# Patient Record
Sex: FEMALE | Race: WHITE | NOT HISPANIC OR LATINO | URBAN - METROPOLITAN AREA
[De-identification: names, ages, dates, MRNs, and addresses within clinical notes are randomized per-mention and may not be internally consistent; named-entity substitution may affect disease eponyms.]

---

## 2023-05-03 ENCOUNTER — OFFICE VISIT (OUTPATIENT)
Dept: FAMILY MEDICINE CLINIC | Facility: CLINIC | Age: 68
End: 2023-05-03

## 2023-05-03 VITALS
RESPIRATION RATE: 16 BRPM | TEMPERATURE: 98.8 F | HEART RATE: 74 BPM | HEIGHT: 62 IN | OXYGEN SATURATION: 97 % | DIASTOLIC BLOOD PRESSURE: 80 MMHG | BODY MASS INDEX: 41.41 KG/M2 | WEIGHT: 225 LBS | SYSTOLIC BLOOD PRESSURE: 110 MMHG

## 2023-05-03 DIAGNOSIS — I10 PRIMARY HYPERTENSION: ICD-10-CM

## 2023-05-03 DIAGNOSIS — Z00.00 ANNUAL PHYSICAL EXAM: Primary | ICD-10-CM

## 2023-05-03 DIAGNOSIS — E66.01 CLASS 3 SEVERE OBESITY DUE TO EXCESS CALORIES WITH SERIOUS COMORBIDITY AND BODY MASS INDEX (BMI) OF 40.0 TO 44.9 IN ADULT (HCC): ICD-10-CM

## 2023-05-03 DIAGNOSIS — Z23 IMMUNIZATION DUE: ICD-10-CM

## 2023-05-03 DIAGNOSIS — M19.90 GENERALIZED ARTHRITIS: ICD-10-CM

## 2023-05-03 DIAGNOSIS — F32.A ANXIETY AND DEPRESSION: ICD-10-CM

## 2023-05-03 DIAGNOSIS — F41.9 ANXIETY AND DEPRESSION: ICD-10-CM

## 2023-05-03 DIAGNOSIS — Z12.31 BREAST CANCER SCREENING BY MAMMOGRAM: ICD-10-CM

## 2023-05-03 DIAGNOSIS — R92.8 ABNORMAL MAMMOGRAM OF RIGHT BREAST: ICD-10-CM

## 2023-05-03 DIAGNOSIS — J45.20 MILD INTERMITTENT ASTHMA WITHOUT COMPLICATION: ICD-10-CM

## 2023-05-03 DIAGNOSIS — E55.9 VITAMIN D DEFICIENCY: ICD-10-CM

## 2023-05-03 DIAGNOSIS — Z96.653 HISTORY OF BILATERAL KNEE ARTHROPLASTY: ICD-10-CM

## 2023-05-03 DIAGNOSIS — Z11.59 NEED FOR HEPATITIS C SCREENING TEST: ICD-10-CM

## 2023-05-03 DIAGNOSIS — T78.40XD ALLERGY, SUBSEQUENT ENCOUNTER: ICD-10-CM

## 2023-05-03 DIAGNOSIS — N63.0 BREAST NODULE: ICD-10-CM

## 2023-05-03 DIAGNOSIS — K21.9 GASTROESOPHAGEAL REFLUX DISEASE WITHOUT ESOPHAGITIS: ICD-10-CM

## 2023-05-03 PROBLEM — T78.40XA ALLERGIES: Status: ACTIVE | Noted: 2023-05-03

## 2023-05-03 PROBLEM — E66.813 CLASS 3 SEVERE OBESITY DUE TO EXCESS CALORIES WITH SERIOUS COMORBIDITY AND BODY MASS INDEX (BMI) OF 40.0 TO 44.9 IN ADULT (HCC): Status: ACTIVE | Noted: 2023-05-03

## 2023-05-03 RX ORDER — MELATONIN
1000 DAILY
COMMUNITY

## 2023-05-03 RX ORDER — VALSARTAN AND HYDROCHLOROTHIAZIDE 160; 12.5 MG/1; MG/1
1 TABLET, FILM COATED ORAL DAILY
Qty: 90 TABLET | Refills: 3 | Status: SHIPPED | OUTPATIENT
Start: 2023-05-03

## 2023-05-03 RX ORDER — PAROXETINE HYDROCHLORIDE 20 MG/1
TABLET, FILM COATED ORAL
COMMUNITY
Start: 2023-02-23 | End: 2023-05-03 | Stop reason: SDUPTHER

## 2023-05-03 RX ORDER — MELOXICAM 15 MG/1
TABLET ORAL
COMMUNITY
Start: 2023-03-13

## 2023-05-03 RX ORDER — PAROXETINE HYDROCHLORIDE 20 MG/1
20 TABLET, FILM COATED ORAL DAILY
Qty: 90 TABLET | Refills: 3 | Status: SHIPPED | OUTPATIENT
Start: 2023-05-03

## 2023-05-03 RX ORDER — FAMOTIDINE 20 MG/1
20 TABLET, FILM COATED ORAL 2 TIMES DAILY
Qty: 180 TABLET | Refills: 3 | Status: SHIPPED | OUTPATIENT
Start: 2023-05-03

## 2023-05-03 RX ORDER — VALSARTAN AND HYDROCHLOROTHIAZIDE 160; 12.5 MG/1; MG/1
1 TABLET, FILM COATED ORAL DAILY
COMMUNITY
End: 2023-05-03 | Stop reason: SDUPTHER

## 2023-05-03 RX ORDER — ALBUTEROL SULFATE 90 UG/1
2 AEROSOL, METERED RESPIRATORY (INHALATION) EVERY 6 HOURS PRN
COMMUNITY

## 2023-05-03 RX ORDER — CETIRIZINE HYDROCHLORIDE 10 MG/1
10 TABLET ORAL DAILY
COMMUNITY

## 2023-05-03 RX ORDER — DIPHENOXYLATE HYDROCHLORIDE AND ATROPINE SULFATE 2.5; .025 MG/1; MG/1
1 TABLET ORAL DAILY
COMMUNITY

## 2023-05-03 NOTE — PROGRESS NOTES
Name: Keith Auguste      : 1955      MRN: [id number]  Encounter Provider: Josee Bhat MD  Encounter Date: 5/3/2023   Encounter department: [address] [place] [address] MEDICINE    Assessment & Plan     1  Annual physical exam  Assessment & Plan:  Check routine labs      Orders:  -     CBC and differential; Future  -     Comprehensive metabolic panel; Future; Expected date: 2023  -     Lipid panel; Future; Expected date: 2023  -     Urinalysis with microscopic    2  Primary hypertension  Assessment & Plan:  Well controlled on current therapy continue with current medications and will reassess next visit      Orders:  -     valsartan-hydrochlorothiazide (DIOVAN-HCT) 160-12 5 MG per tablet; Take 1 tablet by mouth daily    3  Need for hepatitis C screening test  -     Hepatitis C Antibody; Future    4  Abnormal mammogram of right breast  Assessment & Plan:  Benign lobule due for bilateral mammo and right breast US      5  Breast cancer screening by mammogram  -     Mammo diagnostic bilateral w cad; Future; Expected date: 2023  -     US breast right limited (diagnostic); Future; Expected date: 2023    6  Breast nodule  -     US breast right limited (diagnostic); Future; Expected date: 2023    7  Vitamin D deficiency  Assessment & Plan:  Check level    Orders:  -     Vitamin D 25 hydroxy; Future    8  Anxiety and depression  Assessment & Plan:  Doing well on paxil needs refill    Orders:  -     PARoxetine (PAXIL) 20 mg tablet; Take 1 tablet (20 mg total) by mouth daily    9  Generalized arthritis  Assessment & Plan:  Helped with meloxicam      10  Mild intermittent asthma without complication  Assessment & Plan:  Uses inhaler with URI or grass exposure       11  Allergy, subsequent encounter  Assessment & Plan: On meds       12   Gastroesophageal reflux disease without esophagitis  Assessment & Plan:  Pt on pepcid 20mg po bid had endoscopy 10/22 ok     Orders:  - famotidine (PEPCID) 20 mg tablet; Take 1 tablet (20 mg total) by mouth 2 (two) times a day    13  History of bilateral knee arthroplasty  Assessment & Plan:  Needs prophylaxis for dental work can take amoxicillin gets diarrhea with augmentin      14  Immunization due  -     TDAP VACCINE GREATER THAN OR EQUAL TO 6YO IM    15  Class 3 severe obesity due to excess calories with serious comorbidity and body mass index (BMI) of 40 0 to 44 9 in adult [facility])  Assessment & Plan:  Discussion on diet and exercise guidelines for weight loss and  health reviewed with pt              Subjective      HPI new pt  Here for physical interval vsiit and to establish care multiple problems and meds  Reviewed chart and records   Review of Systems   Constitutional: Negative for appetite change, chills, fatigue and fever  Respiratory: Negative for cough, chest tightness and shortness of breath  Cardiovascular: Negative for chest pain, palpitations and leg swelling  Gastrointestinal: Negative for abdominal pain, constipation, diarrhea, nausea and vomiting  Genitourinary: Negative for difficulty urinating and frequency  Musculoskeletal: Negative for arthralgias (generalized arthritis), back pain, gait problem and neck pain  Skin: Negative for rash  Neurological: Negative for dizziness, weakness, light-headedness, numbness and headaches  Hematological: Does not bruise/bleed easily  Psychiatric/Behavioral: Negative for dysphoric mood and sleep disturbance  The patient is not nervous/anxious          Current Outpatient Medications on File Prior to Visit   Medication Sig    albuterol (PROVENTIL HFA,VENTOLIN HFA) 90 mcg/act inhaler Inhale 2 puffs every 6 (six) hours as needed for wheezing    cetirizine (ZyrTEC) 10 mg tablet Take 10 mg by mouth daily    cholecalciferol (VITAMIN D3) 1,000 units tablet Take 1,000 Units by mouth daily    meloxicam (MOBIC) 15 mg tablet     multivitamin (THERAGRAN) TABS Take 1 tablet by mouth "daily    [DISCONTINUED] PARoxetine (PAXIL) 20 mg tablet     [DISCONTINUED] valsartan-hydrochlorothiazide (DIOVAN-HCT) 160-12 5 MG per tablet Take 1 tablet by mouth daily       Objective     /80 (BP Location: Left arm, Patient Position: Sitting, Cuff Size: Large)   Pulse 74   Temp 98 8 °F (37 1 °C) (Tympanic)   Resp 16   Ht 5' 2\" (1 575 m)   Wt 102 kg (225 lb)   SpO2 97%   BMI 41 15 kg/m²     Physical Exam  Vitals reviewed  Constitutional:       General: She is not in acute distress  Appearance: Normal appearance  She is well-developed  She is obese  HENT:      Head: Normocephalic  Right Ear: Tympanic membrane, ear canal and external ear normal       Left Ear: Tympanic membrane, ear canal and external ear normal       Nose: Nose normal       Mouth/Throat:      Pharynx: No oropharyngeal exudate  Eyes:      General: Lids are normal       Extraocular Movements: Extraocular movements intact  Conjunctiva/sclera: Conjunctivae normal       Pupils: Pupils are equal, round, and reactive to light  Neck:      Thyroid: No thyromegaly  Vascular: No carotid bruit  Cardiovascular:      Rate and Rhythm: Normal rate and regular rhythm  Pulses: Normal pulses  Heart sounds: Normal heart sounds  No murmur heard  No friction rub  Pulmonary:      Effort: Pulmonary effort is normal  No respiratory distress  Breath sounds: Normal breath sounds  No stridor  No wheezing or rales  Chest:   Breasts:     Breasts are symmetrical       Right: Normal  No swelling, bleeding, inverted nipple, mass, nipple discharge, skin change or tenderness  Left: Normal  No swelling, bleeding, inverted nipple, mass, nipple discharge, skin change or tenderness  Abdominal:      General: Bowel sounds are normal  There is no distension  Palpations: Abdomen is soft  There is no mass  Tenderness: There is no abdominal tenderness  There is no guarding  Hernia: No hernia is present   " Musculoskeletal:         General: Normal range of motion  Cervical back: Full passive range of motion without pain, normal range of motion and neck supple  Lymphadenopathy:      Cervical: No cervical adenopathy  Skin:     General: Skin is warm and dry  Findings: No rash  Comments: Nl appearing moles   Neurological:      General: No focal deficit present  Mental Status: She is alert and oriented to person, place, and time  Mental status is at baseline  Cranial Nerves: No cranial nerve deficit  Sensory: No sensory deficit  Motor: No abnormal muscle tone  Coordination: Coordination normal       Gait: Gait normal       Deep Tendon Reflexes: Reflexes normal  Babinski sign absent on the right side  Psychiatric:         Mood and Affect: Mood normal          Speech: Speech normal          Behavior: Behavior normal          Thought Content: Thought content normal          Judgment: Judgment normal      BMI Counseling: Body mass index is 41 15 kg/m²  The BMI is above normal  Nutrition recommendations include 3-5 servings of fruits/vegetables daily, reducing fast food intake, consuming healthier snacks, decreasing soda and/or juice intake, moderation in carbohydrate intake and increasing intake of lean protein  Exercise recommendations include exercising 3-5 times per week and strength training exercises    Josee Bhat MD

## 2023-05-04 ENCOUNTER — TELEPHONE (OUTPATIENT)
Dept: ADMINISTRATIVE | Facility: OTHER | Age: 68
End: 2023-05-04

## 2023-05-04 NOTE — TELEPHONE ENCOUNTER
----- Message from Thong Rich MD sent at 5/3/2023  2:46 PM EDT -----  Colonoscopy 10/22 [facility] Concentration Of Solution Injected (Mg/Ml): 10.0

## 2023-05-04 NOTE — TELEPHONE ENCOUNTER
----- Message from Alejandra Zuniga MD sent at 5/3/2023  2:45 PM EDT -----  Mammo and dexa in [facility] chart care everywhere

## 2023-05-04 NOTE — TELEPHONE ENCOUNTER
Upon review of the In Basket request we were able to locate, review, and update the patient chart as requested for CRC: Colonoscopy  Any additional questions or concerns should be emailed to the Practice Liaisons via the appropriate education email address, please do not reply via In Basket      Thank you  Mitchell Biswas

## 2023-05-04 NOTE — TELEPHONE ENCOUNTER
Upon review of the In Basket request we were able to locate, review, and update the patient chart as requested for DEXA Scan  Any additional questions or concerns should be emailed to the Practice Liaisons via the appropriate education email address, please do not reply via In Basket      Thank you  Gil Silva

## 2023-05-04 NOTE — TELEPHONE ENCOUNTER
----- Message from Demetrius Cowden, MD sent at 5/3/2023  2:50 PM EDT -----  Colonoscopy 10/22 in Care everywhere must get result from Op note

## 2023-05-04 NOTE — TELEPHONE ENCOUNTER
----- Message from Damián Ivory sent at 5/3/2023  2:45 PM EDT -----  Regarding: care gap request  05/03/23 2:45 PM    Hello, our patient attached above has had CRC: Colonoscopy completed/performed  Please assist in updating the patient chart by pulling the Care Everywhere (CE) document  The date of service is October 2022       Thank you,  Damián Ivory  [facility]

## 2023-05-04 NOTE — TELEPHONE ENCOUNTER
Upon review of the In Basket request we were able to locate, review, and update the patient chart as requested for Mammogram     Any additional questions or concerns should be emailed to the Practice Liaisons via the appropriate education email address, please do not reply via In Basket      Thank you  Yang Najera

## 2023-05-11 ENCOUNTER — OFFICE VISIT (OUTPATIENT)
Dept: DERMATOLOGY | Facility: CLINIC | Age: 68
End: 2023-05-11

## 2023-05-11 VITALS — TEMPERATURE: 97.9 F | HEIGHT: 62 IN | BODY MASS INDEX: 41.51 KG/M2 | WEIGHT: 225.6 LBS

## 2023-05-11 DIAGNOSIS — D48.5 NEOPLASM OF UNCERTAIN BEHAVIOR OF SKIN: ICD-10-CM

## 2023-05-11 DIAGNOSIS — L82.1 SEBORRHEIC KERATOSIS: ICD-10-CM

## 2023-05-11 DIAGNOSIS — R21 RASH AND NONSPECIFIC SKIN ERUPTION: ICD-10-CM

## 2023-05-11 DIAGNOSIS — D22.9 MULTIPLE NEVI: ICD-10-CM

## 2023-05-11 DIAGNOSIS — L81.4 LENTIGINES: Primary | ICD-10-CM

## 2023-05-11 NOTE — PROGRESS NOTES
"[facility] Note     Patient Name: Anjel Parr  Encounter Date: 05/11/23     Have you been cared for by a [place] [address] Dermatologist in the last 3 years and, if so, which description applies to you? NO  I am considered a \"new\" patient and must complete all patient intake questions  I am FEMALE/of child-bearing potential     REVIEW OF SYSTEMS:  Have you recently had or currently have any of the following? · Recent fever or chills? No  · Any non-healing wound? No  · Are you pregnant or planning to become pregnant? No  · Are you currently or planning to be nursing or breast feeding? No   PAST MEDICAL HISTORY:  Have you personally ever had or currently have any of the following? If \"YES,\" then please provide more detail  · Skin cancer (such as Melanoma, Basal Cell Carcinoma, Squamous Cell Carcinoma? No  · Tuberculosis, HIV/AIDS, Hepatitis B or C: No  · Systemic Immunosuppression such as Diabetes, Biologic or Immunotherapy, Chemotherapy, Organ Transplantation, Bone Marrow Transplantation No  · Radiation Treatment No   FAMILY HISTORY:  Any \"first degree relatives\" (parent, brother, sister, or child) with the following? • Skin Cancer, Pancreatic or Other Cancer? YES, 2 sisters   PATIENT EXPERIENCE:    • Do you want the Dermatologist to perform a COMPLETE skin exam today including a clinical examination under the \"bra and underwear\" areas? Yes  • If necessary, do we have your permission to call and leave a detailed message on your Preferred Phone number that includes your specific medical information?   Yes      Allergies   Allergen Reactions   • Amoxicillin-Pot Clavulanate Diarrhea      Current Outpatient Medications:   •  albuterol (PROVENTIL HFA,VENTOLIN HFA) 90 mcg/act inhaler, Inhale 2 puffs every 6 (six) hours as needed for wheezing, Disp: , Rfl:   •  cetirizine (ZyrTEC) 10 mg tablet, Take 10 mg by mouth daily, Disp: , Rfl:   •  cholecalciferol (VITAMIN D3) 1,000 units tablet, Take 1,000 " Units by mouth daily, Disp: , Rfl:   •  famotidine (PEPCID) 20 mg tablet, Take 1 tablet (20 mg total) by mouth 2 (two) times a day, Disp: 180 tablet, Rfl: 3  •  meloxicam (MOBIC) 15 mg tablet, , Disp: , Rfl:   •  multivitamin (THERAGRAN) TABS, Take 1 tablet by mouth daily, Disp: , Rfl:   •  PARoxetine (PAXIL) 20 mg tablet, Take 1 tablet (20 mg total) by mouth daily, Disp: 90 tablet, Rfl: 3  •  valsartan-hydrochlorothiazide (DIOVAN-HCT) 160-12 5 MG per tablet, Take 1 tablet by mouth daily, Disp: 90 tablet, Rfl: 3          • Whom besides the patient is providing clinical information about today's encounter?   o NO ADDITIONAL HISTORIAN (patient alone provided history)    Physical Exam and Assessment/Plan by Diagnosis:    Patient is here for full skin exam  Patient wants skin tag removed from under right arm    SEBORRHEIC KERATOSES  - Relevant exam: Scattered over the trunk/extremities are waxy brown to black plaques and papules with stuck on appearance  - Exam and clinical history consistent with seborrheic keratoses  - Counseled that these are benign growths that do not require treatment  - Counseled that removal of lesions is considered cosmetic and so would incur a fee should patient elect to move forward  - Patient to hold on treatments for now but will inform us should they desire additional treatments    MELANOCYTIC NEVI  -Relevant exam: Scattered over the trunk/extremities are homogenously pigmented brown macules and papules  ELM performed and without concerning findings  - Exam and clinical history consistent with melanocytic nevi  - Educated on the ABCDE's of melanoma; handout provided  - Counseled to return to clinic prior to scheduled appointment should any of these lesions change or should any new lesions of concern arise  - Counseled on use of sun protection daily   Reviewed latest FDA sunscreen guidelines, including use of broad spectrum (UVA and UVB blocking) sunscreen or sun protective clothing with SPF 30-50 every 2-3 hours and reapplied after exposure to water; use of photoprotective clothing, including a broad brim hat and UPF rated clothing if outdoors for several hours; avoid use of tanning beds as these pose significant risk for melanoma and skin cancer  LENTIGINES  OTHER SKIN CHANGES DUE TO CHRONIC EXPOSURE TO NONIONIZING RADIATION  - Relevant exam: Over sun exposed areas are brown macules  ELM performed and without concerning findings  - Exam and clinical history consistent with lentigines  - Educated that these are indicative of prior sun exposure  - Counseled to return to clinic prior to scheduled appointment should any of these lesions change or should any new lesions of concern arise   - Recommended use of sunscreen as above and below  - Counseled on use of sun protection daily  Reviewed latest FDA sunscreen guidelines, including use of broad spectrum (UVA and UVB blocking) sunscreen or sun protective clothing with SPF 30-50 every 2-3 hours and reapplied after exposure to water; use of photoprotective clothing, including a broad brim hat and UPF rated clothing if outdoors for several hours; avoid use of tanning beds as these pose significant risk for melanoma and skin cancer  CHERRY ANGIOMAS  - Relevant exam: Scattered over the trunk/extremities are red papules  - Exam and clinical history consistent with cherry angiomas  - Educated that these are benign  - Educated that removal is considered aesthetic and would incur a fee  - Patient does not wish to pursue removal at this time but will contact us should this change  Rash  Physical Exam:  • Anatomic Location Affected:  Right collar bone  • Morphological Description:  Edematous to xerotic plaques  • Pertinent Positives:   • Pertinent Negatives: Additional History of Present Condition:  Present on exam  Rash was present for one week  Patient used antibiotic triple cream  Not improving  Itchy       Assessment and Plan:  Based on a "thorough discussion of this condition and the management approach to it (including a comprehensive discussion of the known risks, side effects and potential benefits of treatment), the patient (family) agrees to implement the following specific plan:  - Start Triamcinolone 0 1% ointment  Apply to affected area twice daily for 14 days as needed  If does not resolve call office  - Stop antibiotic triple cream    NEOPLASM OF UNCERTAIN BEHAVIOR OF SKIN                Physical Exam:  • (Anatomic Location); (Size and Morphological Description); (Differential Diagnosis):  o Specimen A: Right Inner Arm; Skin; Shave Biopsy; 0 7 cm x 0 5 cm brown to pink pedunculated papule rule out nevus vs acrochordon  o   • Pertinent Positives:  • Pertinent Negatives: Additional History of Present Condition:  Patient wanted skin tag removed    Assessment and Plan:  • I have discussed with the patient that a sample of skin via a \"skin biopsy” would be potentially helpful to further make a specific diagnosis under the microscope  • Based on a thorough discussion of this condition and the management approach to it (including a comprehensive discussion of the known risks, side effects and potential benefits of treatment), the patient (family) agrees to implement the following specific plan:    o Procedure:  Skin Biopsy  After a thorough discussion of treatment options and risk/benefits/alternatives (including but not limited to local pain, scarring, dyspigmentation, blistering, possible superinfection, and inability to confirm a diagnosis via histopathology), verbal and written consent were obtained and portion of the rash was biopsied for tissue sample  See below for consent that was obtained from patient and subsequent Procedure Note  PROCEDURE TANGENTIAL (SHAVE) BIOPSY NOTE:    • Performing Physician: Dr Catalan  • Anatomic Location; Clinical Description with size (cm); Pre-Op Diagnosis:   o Specimen A: Right Inner Arm; Skin;  Shave " "Biopsy;  0 7 cm x 0 5 cm brown to pink pedunculated papule rule out nevus vs acrochordon  o   • Post-op diagnosis: Same     • Local anesthesia: 1% Lidocaine HCL     • Topical anesthesia: None    • Hemostasis: Aluminum chloride       After obtaining informed consent  at which time there was a discussion about the purpose of biopsy  and low risks of infection and bleeding  The area was prepped and draped in the usual fashion  Anesthesia was obtained with 1% lidocaine with epinephrine  A shave biopsy to an appropriate sampling depth was obtained by Shave (Dermablade or 15 blade) The resulting wound was covered with surgical ointment and bandaged appropriately  The patient tolerated the procedure well without complications and was without signs of functional compromise  Specimen has been sent for review by Dermatopathology  Standard post-procedure care has been explained and has been included in written form within the patient's copy of Informed Consent  INFORMED CONSENT DISCUSSION AND POST-OPERATIVE INSTRUCTIONS FOR PATIENT    I   RATIONALE FOR PROCEDURE  I understand that a skin biopsy allows the Dermatologist to test a lesion or rash under the microscope to obtain a diagnosis  It usually involves numbing the area with numbing medication and removing a small piece of skin; sometimes the area will be closed with sutures  In this specific procedure, sutures are not usually needed  If any sutures are placed, then they are usually need to be removed in 2 weeks or less  I understand that my Dermatologist recommends that a skin \"shave\" biopsy be performed today  A local anesthetic, similar to the kind that a dentist uses when filling a cavity, will be injected with a very small needle into the skin area to be sampled  The injected skin and tissue underneath \"will go to sleep” and become numb so no pain should be felt afterwards    An instrument shaped like a tiny \"razor blade\" (shave biopsy instrument) " "will be used to cut a small piece of tissue and skin from the area so that a sample of tissue can be taken and examined more closely under the microscope  A slight amount of bleeding will occur, but it will be stopped with direct pressure and a pressure bandage and any other appropriate methods  I understands that a scar will form where the wound was created  Surgical ointment will be applied to help protect the wound  Sutures are not usually needed  II   RISKS AND POTENTIAL COMPLICATIONS   I understand the risks and potential complications of a skin biopsy include but are not limited to the following:  • Bleeding  • Infection  • Pain  • Scar/keloid  • Skin discoloration  • Incomplete Removal  • Recurrence  • Nerve Damage/Numbness/Loss of Function  • Allergic Reaction to Anesthesia  • Biopsies are diagnostic procedures and based on findings additional treatment or evaluation may be required  • Loss or destruction of specimen resulting in no additional findings    My Dermatologist has explained to me the nature of the condition, the nature of the procedure, and the benefits to be reasonably expected compared with alternative approaches  My Dermatologist has discussed the likelihood of major risks or complications of this procedure including the specific risks listed above, such as bleeding, infection, and scarring/keloid  I understand that a scar is expected after this procedure  I understand that my physician cannot predict if the scar will form a \"keloid,\" which extends beyond the borders of the wound that is created  A keloid is a thick, painful, and bumpy scar  A keloid can be difficult to treat, as it does not always respond well to therapy, which includes injecting cortisone directly into the keloid every few weeks  While this usually reduces the pain and size of the scar, it does not eliminate it  I understand that photographs may be taken before and after the procedure    These will be " "maintained as part of the medical providers confidential records and may not be made available to me  I further authorize the medical provider to use the photographs for teaching purposes or to illustrate scientific papers, books, or lectures if in his/her judgment, medical research, education, or science may benefit from its use  I have had an opportunity to fully inquire about the risks and benefits of this procedure and its alternatives  I have been given ample time and opportunity to ask questions and to seek a second opinion if I wished to do so  I acknowledge that there have specifically been no guarantees as to the cosmetic results from the procedure  I am aware that with any procedure there is always the possibility of an unexpected complication  III  POST-PROCEDURAL CARE (WHAT YOU WILL NEED TO DO \"AFTER THE BIOPSY\" TO OPTIMIZE HEALING)    • Keep the area clean and dry  Try NOT to remove the bandage or get it wet for the first 24 hours  • Gently clean the area and apply surgical ointment (such as Vaseline petrolatum ointment, which is available \"over the counter\" and not a prescription) to the biopsy site for up to 2 weeks straight  This acts to protect the wound from the outside world  • Sutures are not usually placed in this procedure  If any sutures were placed, return for suture removal as instructed (generally 1 week for the face, 2 weeks for the body)  • Take Acetaminophen (Tylenol) for discomfort, if no contraindications  Ibuprofen or aspirin could make bleeding worse  • Call our office immediately for signs of infection: fever, chills, increased redness, warmth, tenderness, discomfort/pain, or pus or foul smell coming from the wound  WHAT TO DO IF THERE IS ANY BLEEDING? If a small amount of bleeding is noticed, place a clean cloth over the area and apply firm pressure for ten minutes  Check the wound after 10 minutes of direct pressure    If bleeding persists, try " one more time for an additional 10 minutes of direct pressure on the area  If the bleeding becomes heavier or does not stop after the second attempt, or if you have any other questions about this procedure, then please call your [facility] Dermatologist by calling [phone] (SKIN)  I hereby acknowledge that I have reviewed and verified the site with my Dermatologist and have requested and authorized my Dermatologist to proceed with the procedure              Scribe Attestation    I,:  Andrea Patel am acting as a scribe while in the presence of the attending physician :       I,:  Khadra Sanchez MD personally performed the services described in this documentation    as scribed in my presence :

## 2023-05-11 NOTE — PATIENT INSTRUCTIONS
What is skin cancer? Skin cancer is unfortunately very common  That's why we are here to help you on your journey to healthy happy skin! There are two main types of skin cancer: melanoma and non-melanoma skin cancer  Melanoma is a form of skin cancer that often arises within an existing nevus or mole  However, this is not always the case  Melanoma can arise anywhere (not only where you have moles right now)  Melanoma can run in families, so letting us know about your family history is important  Non-melanoma skin cancer is the most common type of cancer in the [place]  The two main types of non-melanoma skin cancers are basal cell carcinomas (BCC) and squamous cell carcinoma (SCC)  These cancers tend to be less aggressive than melanomas but are still important to look for and treat  What can I do to prevent skin cancer? One of the largest risk factors for skin cancer is sun exposure or UV radiation  Therefore, sun protection is shearer! Here are some great tips for protecting yourself! Try to avoid direct sun exposure during peak sun hours (10 AM to 2 PM)  Remember you get A LOT of sun even under cloud coverage and through care windows! When choosing a sunscreen, look for one that says “broad spectrum” sunscreen  This means it protects you from more of the harmful UV rays  Choose a sunscreen that is SPF 30 or greater for best protection  Apply sunscreen to all sun-exposed skin and reapply every 2 hours  Consider sun protective clothing! Great additions to your sun protective clothing wardrobe include broad brimmed hats, sunglasses, UPF clothing  Avoid tanning salons  These have been shown to be very harmful in terms of your risk of skin cancer  Avoid “base tans”  We now know that tans are dangerous (not just sun burns)  If you want to have a tan for a trip, consider a spray tan! Should I check my skin at home between my dermatology appointments? Yes!  It's always a great idea to look at your "skin on a regular basis  Here are some things to look for when monitoring your skin  For melanoma, look for the ABCDE's! A = Asymmetry  Look for a spot where one half does not match the other! B = Borders  Look for a spot that has jagged, ragged or irregular borders  C = Color  Look for a spot that is not evenly colored and often includes multiple colors, especially true black, red, white, blue, grey  D = Diameter  Look for a spot that is larger than the size of a pencil eraser  E = Evolution  If you ever have a spot that is changing in shape, color, size or symptoms (becomes itchy, painful or starts to bleed), always call us! For non-melanoma skin cancers, look for a new, pink spot that is not going away, especially one that is itchy, painful or bleeding  What should I do if I see a spot that is concerning for melanoma or non-melanoma skin cancer? If you are ever concerned, call us! Do not wait for your next appointment  We want to help! The nature of sun-induced photo-aging and skin cancers is discussed  Sun avoidance, protective clothing, and the use of 30-SPF sunscreens is advised  Observe closely for skin damage/changes, and call if such occurs  Use a moisturizer + sunscreen \"combo\" product such as Neutrogena Daily Defense SPF 50+ or CeraVe AM at least three times a day     "

## 2023-05-17 NOTE — RESULT ENCOUNTER NOTE
DERMATOPATHOLOGY RESULT NOTE    Results reviewed by ordering physician  Called patient to personally discuss results  [employer] Communication[employer] Message sent      Instructions for Clinical Derm Team:   (remember to route Result Note to appropriate staff):    None    Result & Plan by Specimen:    Specimen A: benign  Plan: reassured, benign      Case Report  Surgical Pathology Report                         Case: [id number]                                   Authorizing Provider: Saundra Caballero MD Collected:           05/11/2023 [facility]              Ordering Location:     [facility]      Received:            05/11/2023 [address] [place] [address]                                                                Pathologist:           Oswaldo Kellogg MD                                                           Specimen:    Skin, Other, Specimen A: Right Inner Arm                                                 Final Diagnosis  A  Skin, right inner arm, shave biopsy:     SEBORRHEIC KERATOSIS

## 2023-06-13 DIAGNOSIS — N63.0 BREAST NODULE: Primary | ICD-10-CM

## 2023-06-15 ENCOUNTER — TELEPHONE (OUTPATIENT)
Dept: FAMILY MEDICINE CLINIC | Facility: CLINIC | Age: 68
End: 2023-06-15

## 2023-06-15 NOTE — TELEPHONE ENCOUNTER
LM on our VM:  Patient states she tried to schedule her diagnostic mammogram, however she said the  doesn't understand the script    (Not sure if it's because of 2 scripts, DIRK)

## 2023-06-15 NOTE — TELEPHONE ENCOUNTER
Spoke to   Needed to know which breast has the nodule    will call patient to schedule mammogram and US

## 2023-06-27 ENCOUNTER — TELEPHONE (OUTPATIENT)
Dept: DERMATOLOGY | Facility: CLINIC | Age: 68
End: 2023-06-27

## 2023-06-27 NOTE — TELEPHONE ENCOUNTER
Alison Hernandez,   It looks like they need a copy of  the explanation of benefits issued by medicare  I do not believe we receive a copy of this information so this would need to be submitted by the patient  Can you please contact the patient to let them know? Thank you!   Nai Anderson

## 2023-07-25 ENCOUNTER — TELEPHONE (OUTPATIENT)
Dept: FAMILY MEDICINE CLINIC | Facility: CLINIC | Age: 68
End: 2023-07-25

## 2023-07-25 NOTE — TELEPHONE ENCOUNTER
Cristel garcia/[facility] is calling about a script. She needs a corrected one for Diagnostic mammogram, with dx code for R breast mass. AND is also asking for an US of R breast script.    I see these ARE correct in system & will fax them to [phone]

## 2023-07-31 DIAGNOSIS — Z12.31 BREAST CANCER SCREENING BY MAMMOGRAM: ICD-10-CM

## 2023-11-15 DIAGNOSIS — I10 PRIMARY HYPERTENSION: ICD-10-CM

## 2023-11-15 RX ORDER — VALSARTAN AND HYDROCHLOROTHIAZIDE 160; 12.5 MG/1; MG/1
1 TABLET, FILM COATED ORAL DAILY
Qty: 90 TABLET | Refills: 0 | Status: SHIPPED | OUTPATIENT
Start: 2023-11-15

## 2023-11-15 NOTE — TELEPHONE ENCOUNTER
Patient needs updated blood work and has previously placed orders. Please contact patient to go for labs.   CMP

## 2023-11-15 NOTE — TELEPHONE ENCOUNTER
Reason for call: Pateint needs refills sent to [facility] pharmacy. She is no longer covered by [employer] [facility]. [x] Refill   [] Prior Auth  [] Other:     Office:   [x] PCP/Provider - Luz Dawkins  [] Specialty/Provider -     Medication: Valsartan-hctz 160-12.5mg    Dose/Frequency: 1 tablet daily    Quantity: 90    [facility]    Does the patient have enough for 3 days?    [x] Yes   [] No - Send as HP to POD

## 2023-12-21 ENCOUNTER — TELEPHONE (OUTPATIENT)
Age: 68
End: 2023-12-21

## 2023-12-21 NOTE — TELEPHONE ENCOUNTER
Patient is not feeling well. Congestion runny nose coughing aches body pain NO TASTE OR SMELL. Is there anything that can be prescribed. Please advise  Gladys Cuadra  No appt avail...

## 2024-02-05 ENCOUNTER — APPOINTMENT (OUTPATIENT)
Dept: LAB | Facility: CLINIC | Age: 69
End: 2024-02-05
Payer: COMMERCIAL

## 2024-02-05 DIAGNOSIS — Z00.00 ANNUAL PHYSICAL EXAM: ICD-10-CM

## 2024-02-05 DIAGNOSIS — E55.9 VITAMIN D DEFICIENCY: ICD-10-CM

## 2024-02-05 DIAGNOSIS — Z11.59 NEED FOR HEPATITIS C SCREENING TEST: ICD-10-CM

## 2024-02-05 LAB
25(OH)D3 SERPL-MCNC: 28 NG/ML (ref 30–100)
ALBUMIN SERPL BCP-MCNC: 4.1 G/DL (ref 3.5–5)
ALP SERPL-CCNC: 62 U/L (ref 34–104)
ALT SERPL W P-5'-P-CCNC: 12 U/L (ref 7–52)
ANION GAP SERPL CALCULATED.3IONS-SCNC: 9 MMOL/L
AST SERPL W P-5'-P-CCNC: 17 U/L (ref 13–39)
BACTERIA UR QL AUTO: ABNORMAL /HPF
BASOPHILS # BLD AUTO: 0.06 THOUSANDS/ÂΜL (ref 0–0.1)
BASOPHILS NFR BLD AUTO: 1 % (ref 0–1)
BILIRUB SERPL-MCNC: 0.45 MG/DL (ref 0.2–1)
BILIRUB UR QL STRIP: NEGATIVE
BUN SERPL-MCNC: 11 MG/DL (ref 5–25)
CALCIUM SERPL-MCNC: 9.9 MG/DL (ref 8.4–10.2)
CHLORIDE SERPL-SCNC: 102 MMOL/L (ref 96–108)
CHOLEST SERPL-MCNC: 137 MG/DL
CLARITY UR: CLEAR
CO2 SERPL-SCNC: 29 MMOL/L (ref 21–32)
COLOR UR: YELLOW
CREAT SERPL-MCNC: 0.53 MG/DL (ref 0.6–1.3)
EOSINOPHIL # BLD AUTO: 0.22 THOUSAND/ÂΜL (ref 0–0.61)
EOSINOPHIL NFR BLD AUTO: 3 % (ref 0–6)
ERYTHROCYTE [DISTWIDTH] IN BLOOD BY AUTOMATED COUNT: 13.5 % (ref 11.6–15.1)
GFR SERPL CREATININE-BSD FRML MDRD: 98 ML/MIN/1.73SQ M
GLUCOSE P FAST SERPL-MCNC: 98 MG/DL (ref 65–99)
GLUCOSE UR STRIP-MCNC: NEGATIVE MG/DL
HCT VFR BLD AUTO: 44 % (ref 34.8–46.1)
HCV AB SER QL: NORMAL
HDLC SERPL-MCNC: 41 MG/DL
HGB BLD-MCNC: 14.2 G/DL (ref 11.5–15.4)
HGB UR QL STRIP.AUTO: NEGATIVE
IMM GRANULOCYTES # BLD AUTO: 0.04 THOUSAND/UL (ref 0–0.2)
IMM GRANULOCYTES NFR BLD AUTO: 1 % (ref 0–2)
KETONES UR STRIP-MCNC: NEGATIVE MG/DL
LDLC SERPL CALC-MCNC: 52 MG/DL (ref 0–100)
LEUKOCYTE ESTERASE UR QL STRIP: ABNORMAL
LYMPHOCYTES # BLD AUTO: 2.34 THOUSANDS/ÂΜL (ref 0.6–4.47)
LYMPHOCYTES NFR BLD AUTO: 28 % (ref 14–44)
MCH RBC QN AUTO: 30.2 PG (ref 26.8–34.3)
MCHC RBC AUTO-ENTMCNC: 32.3 G/DL (ref 31.4–37.4)
MCV RBC AUTO: 94 FL (ref 82–98)
MONOCYTES # BLD AUTO: 0.58 THOUSAND/ÂΜL (ref 0.17–1.22)
MONOCYTES NFR BLD AUTO: 7 % (ref 4–12)
MUCOUS THREADS UR QL AUTO: ABNORMAL
NEUTROPHILS # BLD AUTO: 5.11 THOUSANDS/ÂΜL (ref 1.85–7.62)
NEUTS SEG NFR BLD AUTO: 60 % (ref 43–75)
NITRITE UR QL STRIP: NEGATIVE
NON-SQ EPI CELLS URNS QL MICRO: ABNORMAL /HPF
NONHDLC SERPL-MCNC: 96 MG/DL
NRBC BLD AUTO-RTO: 0 /100 WBCS
PH UR STRIP.AUTO: 6 [PH]
PLATELET # BLD AUTO: 254 THOUSANDS/UL (ref 149–390)
PMV BLD AUTO: 9.9 FL (ref 8.9–12.7)
POTASSIUM SERPL-SCNC: 4 MMOL/L (ref 3.5–5.3)
PROT SERPL-MCNC: 7.4 G/DL (ref 6.4–8.4)
PROT UR STRIP-MCNC: ABNORMAL MG/DL
RBC # BLD AUTO: 4.7 MILLION/UL (ref 3.81–5.12)
RBC #/AREA URNS AUTO: ABNORMAL /HPF
SODIUM SERPL-SCNC: 140 MMOL/L (ref 135–147)
SP GR UR STRIP.AUTO: 1.02 (ref 1–1.03)
TRIGL SERPL-MCNC: 222 MG/DL
UROBILINOGEN UR STRIP-ACNC: <2 MG/DL
WBC # BLD AUTO: 8.35 THOUSAND/UL (ref 4.31–10.16)
WBC #/AREA URNS AUTO: ABNORMAL /HPF

## 2024-02-05 PROCEDURE — 80061 LIPID PANEL: CPT

## 2024-02-05 PROCEDURE — 80053 COMPREHEN METABOLIC PANEL: CPT

## 2024-02-05 PROCEDURE — 36415 COLL VENOUS BLD VENIPUNCTURE: CPT

## 2024-02-05 PROCEDURE — 85025 COMPLETE CBC W/AUTO DIFF WBC: CPT

## 2024-02-05 PROCEDURE — 82306 VITAMIN D 25 HYDROXY: CPT

## 2024-02-05 PROCEDURE — 86803 HEPATITIS C AB TEST: CPT

## 2024-02-08 DIAGNOSIS — R82.90 ABNORMAL URINALYSIS: Primary | ICD-10-CM

## 2024-02-12 ENCOUNTER — APPOINTMENT (OUTPATIENT)
Dept: LAB | Facility: HOSPITAL | Age: 69
End: 2024-02-12
Payer: COMMERCIAL

## 2024-02-12 DIAGNOSIS — R82.90 ABNORMAL URINALYSIS: ICD-10-CM

## 2024-02-12 LAB
BILIRUB UR QL STRIP: NEGATIVE
CLARITY UR: CLEAR
COLOR UR: YELLOW
GLUCOSE UR STRIP-MCNC: NEGATIVE MG/DL
HGB UR QL STRIP.AUTO: NEGATIVE
KETONES UR STRIP-MCNC: NEGATIVE MG/DL
LEUKOCYTE ESTERASE UR QL STRIP: NEGATIVE
NITRITE UR QL STRIP: NEGATIVE
PH UR STRIP.AUTO: 7 [PH]
PROT UR STRIP-MCNC: NEGATIVE MG/DL
SP GR UR STRIP.AUTO: 1.02 (ref 1–1.03)
UROBILINOGEN UR QL STRIP.AUTO: 0.2 E.U./DL

## 2024-02-12 PROCEDURE — 81003 URINALYSIS AUTO W/O SCOPE: CPT

## 2024-02-13 ENCOUNTER — TELEMEDICINE (OUTPATIENT)
Dept: FAMILY MEDICINE CLINIC | Facility: CLINIC | Age: 69
End: 2024-02-13
Payer: COMMERCIAL

## 2024-02-13 VITALS — HEIGHT: 63 IN | WEIGHT: 214 LBS | BODY MASS INDEX: 37.92 KG/M2

## 2024-02-13 DIAGNOSIS — I10 PRIMARY HYPERTENSION: ICD-10-CM

## 2024-02-13 DIAGNOSIS — F41.9 ANXIETY AND DEPRESSION: ICD-10-CM

## 2024-02-13 DIAGNOSIS — M19.90 GENERALIZED ARTHRITIS: ICD-10-CM

## 2024-02-13 DIAGNOSIS — F32.A ANXIETY AND DEPRESSION: ICD-10-CM

## 2024-02-13 DIAGNOSIS — J45.20 MILD INTERMITTENT ASTHMA WITHOUT COMPLICATION: Primary | ICD-10-CM

## 2024-02-13 PROCEDURE — G0439 PPPS, SUBSEQ VISIT: HCPCS | Performed by: FAMILY MEDICINE

## 2024-02-13 RX ORDER — MELOXICAM 15 MG/1
15 TABLET ORAL DAILY
Qty: 90 TABLET | Refills: 0 | Status: SHIPPED | OUTPATIENT
Start: 2024-02-13

## 2024-02-13 RX ORDER — PAROXETINE HYDROCHLORIDE 20 MG/1
20 TABLET, FILM COATED ORAL DAILY
Qty: 90 TABLET | Refills: 0 | Status: SHIPPED | OUTPATIENT
Start: 2024-02-13

## 2024-02-13 RX ORDER — VALSARTAN AND HYDROCHLOROTHIAZIDE 160; 12.5 MG/1; MG/1
1 TABLET, FILM COATED ORAL DAILY
Qty: 90 TABLET | Refills: 0 | Status: SHIPPED | OUTPATIENT
Start: 2024-02-13

## 2024-02-13 RX ORDER — ALBUTEROL SULFATE 90 UG/1
2 AEROSOL, METERED RESPIRATORY (INHALATION) EVERY 6 HOURS PRN
Qty: 8.5 G | Refills: 0 | Status: SHIPPED | OUTPATIENT
Start: 2024-02-13

## 2024-02-13 NOTE — PROGRESS NOTES
Assessment and Plan:     Problem List Items Addressed This Visit        Respiratory    Mild intermittent asthma without complication - Primary    Relevant Medications    albuterol (PROVENTIL HFA,VENTOLIN HFA) 90 mcg/act inhaler       Cardiovascular and Mediastinum    Primary hypertension    Relevant Medications    valsartan-hydrochlorothiazide (DIOVAN-HCT) 160-12.5 MG per tablet       Musculoskeletal and Integument    Generalized arthritis    Relevant Medications    meloxicam (MOBIC) 15 mg tablet       Other    Anxiety and depression    Relevant Medications    PARoxetine (PAXIL) 20 mg tablet        Preventive health issues were discussed with patient, and age appropriate screening tests were ordered as noted in patient's After Visit Summary.  Personalized health advice and appropriate referrals for health education or preventive services given if needed, as noted in patient's After Visit Summary.     History of Present Illness:     Patient presents for a Medicare Wellness Visit    Hypertension       Patient Care Team:  Juju Mustafa MD as PCP - General (Family Medicine)     Review of Systems:     Review of Systems     Problem List:     Patient Active Problem List   Diagnosis   • Primary hypertension   • Annual physical exam   • Abnormal mammogram of right breast   • Allergies   • Vitamin D deficiency   • Anxiety and depression   • Generalized arthritis   • Mild intermittent asthma without complication   • Gastroesophageal reflux disease without esophagitis   • History of bilateral knee arthroplasty   • Class 3 severe obesity due to excess calories with serious comorbidity and body mass index (BMI) of 40.0 to 44.9 in adult (HCC)      Past Medical and Surgical History:     Past Medical History:   Diagnosis Date   • Anxiety    • Depression    • Hypertension      Past Surgical History:   Procedure Laterality Date   • EYE SURGERY      cataract   • KNEE SURGERY        Family History:     Family History   Problem  Relation Age of Onset   • Cancer Mother    • Stroke Father    • Diabetes Father       Social History:     Social History     Socioeconomic History   • Marital status:      Spouse name: None   • Number of children: None   • Years of education: None   • Highest education level: None   Occupational History   • None   Tobacco Use   • Smoking status: Never   • Smokeless tobacco: Never   Vaping Use   • Vaping status: Never Used   Substance and Sexual Activity   • Alcohol use: Yes     Comment: rarely   • Drug use: Yes     Types: Marijuana   • Sexual activity: Not Currently   Other Topics Concern   • None   Social History Narrative   • None     Social Determinants of Health     Financial Resource Strain: Low Risk  (2/13/2024)    Overall Financial Resource Strain (CARDIA)    • Difficulty of Paying Living Expenses: Not hard at all   Food Insecurity: Not on file   Transportation Needs: No Transportation Needs (2/13/2024)    PRAPARE - Transportation    • Lack of Transportation (Medical): No    • Lack of Transportation (Non-Medical): No   Physical Activity: Not on file   Stress: Not on file   Social Connections: Not on file   Intimate Partner Violence: Not on file   Housing Stability: Not on file      Medications and Allergies:     Current Outpatient Medications   Medication Sig Dispense Refill   • albuterol (PROVENTIL HFA,VENTOLIN HFA) 90 mcg/act inhaler Inhale 2 puffs every 6 (six) hours as needed for wheezing 8.5 g 0   • cetirizine (ZyrTEC) 10 mg tablet Take 10 mg by mouth daily     • cholecalciferol (VITAMIN D3) 1,000 units tablet Take 2,000 Units by mouth daily     • famotidine (PEPCID) 20 mg tablet Take 1 tablet (20 mg total) by mouth 2 (two) times a day 180 tablet 3   • meloxicam (MOBIC) 15 mg tablet Take 1 tablet (15 mg total) by mouth daily 90 tablet 0   • multivitamin (THERAGRAN) TABS Take 1 tablet by mouth daily     • PARoxetine (PAXIL) 20 mg tablet Take 1 tablet (20 mg total) by mouth daily 90 tablet 0   •  triamcinolone (KENALOG) 0.1 % ointment Apply to affected area twice daily for 14 days as needed. May repeat course after one week break. DO NOT apply to face, groin, axillae. 30 g 1   • valsartan-hydrochlorothiazide (DIOVAN-HCT) 160-12.5 MG per tablet Take 1 tablet by mouth daily 90 tablet 00     No current facility-administered medications for this visit.     Allergies   Allergen Reactions   • Amoxicillin-Pot Clavulanate Diarrhea      Immunizations:     Immunization History   Administered Date(s) Administered   • COVID-19 PFIZER VACCINE 0.3 ML IM 03/19/2021, 04/09/2021, 10/20/2021, 04/17/2022, 10/11/2022   • COVID-19 Pfizer mRNA vacc PF jenn-sucrose 12 yr and older (Comirnaty) 10/13/2023   • Pneumococcal Polysaccharide PPV23 12/15/2021   • Tdap 05/03/2023      Health Maintenance:         Topic Date Due   • Breast Cancer Screening: Mammogram  07/28/2024   • Colorectal Cancer Screening  10/17/2032   • Hepatitis C Screening  Completed         Topic Date Due   • Pneumococcal Vaccine: 65+ Years (2 - PCV) 12/15/2022   • COVID-19 Vaccine (7 - 2023-24 season) 12/08/2023      Medicare Screening Tests and Risk Assessments:     Anusha is here for her Subsequent Wellness visit.     Health Risk Assessment:   Patient rates overall health as good. Patient feels that their physical health rating is same. Patient is satisfied with their life. Eyesight was rated as same. Hearing was rated as slightly worse. Patient feels that their emotional and mental health rating is same. Patients states they are never, rarely angry. Patient states they are sometimes unusually tired/fatigued. Pain experienced in the last 7 days has been some. Patient's pain rating has been 6/10. Patient states that she has experienced no weight loss or gain in last 6 months.     Depression Screening:   PHQ-9 Score: 0      Fall Risk Screening:   In the past year, patient has experienced: no history of falling in past year      Urinary Incontinence Screening:    Patient has leaked urine accidently in the last six months.     Home Safety:  Patient does not have trouble with stairs inside or outside of their home. Patient has working smoke alarms and has working carbon monoxide detector. Home safety hazards include: none.     Nutrition:   Current diet is Regular.     Medications:   Patient is currently taking over-the-counter supplements. OTC medications include: see medication list. Patient is able to manage medications.     Activities of Daily Living (ADLs)/Instrumental Activities of Daily Living (IADLs):   Walk and transfer into and out of bed and chair?: Yes  Dress and groom yourself?: Yes    Bathe or shower yourself?: Yes    Feed yourself? Yes  Do your laundry/housekeeping?: Yes  Manage your money, pay your bills and track your expenses?: Yes  Make your own meals?: Yes    Do your own shopping?: Yes    Previous Hospitalizations:   Any hospitalizations or ED visits within the last 12 months?: No      Advance Care Planning:   Living will: No    Durable POA for healthcare: No    Advanced directive: No    Advanced directive counseling given: Yes    Five wishes given: No    Patient declined ACP directive: No    End of Life Decisions reviewed with patient: Yes    Provider agrees with end of life decisions: Yes      Cognitive Screening:   Provider or family/friend/caregiver concerned regarding cognition?: No    PREVENTIVE SCREENINGS      Cardiovascular Screening:    General: Screening Current      Diabetes Screening:     General: Screening Current      Colorectal Cancer Screening:     General: Screening Current      Breast Cancer Screening:     General: Screening Current      Cervical Cancer Screening:    General: Screening Not Indicated      Lung Cancer Screening:     General: Screening Not Indicated      Hepatitis C Screening:    General: Screening Current    Screening, Brief Intervention, and Referral to Treatment (SBIRT)    Screening  Typical number of drinks in a day:  "0  Typical number of drinks in a week: 0  Interpretation: Low risk drinking behavior.    AUDIT-C Screenin) How often did you have a drink containing alcohol in the past year? monthly or less  2) How many drinks did you have on a typical day when you were drinking in the past year? 1 to 2  3) How often did you have 6 or more drinks on one occasion in the past year? never    AUDIT-C Score: 1  Interpretation: Score 0-2 (female): Negative screen for alcohol misuse    Single Item Drug Screening:  How often have you used an illegal drug (including marijuana) or a prescription medication for non-medical reasons in the past year? never    Single Item Drug Screen Score: 0  Interpretation: Negative screen for possible drug use disorder    No results found.     Physical Exam:     Ht 5' 2.5\" (1.588 m)   Wt 97.1 kg (214 lb)   BMI 38.52 kg/m²     Physical Exam     Juju Mustafa MD  "

## 2024-02-13 NOTE — PROGRESS NOTES
Virtual Regular Visit    Verification of patient location:    Patient is located at Home in the following state in which I hold an active license [place]      Assessment/Plan:    Problem List Items Addressed This Visit        Respiratory    Mild intermittent asthma without complication - Primary    Relevant Medications    albuterol (PROVENTIL HFA,VENTOLIN HFA) 90 mcg/act inhaler       Cardiovascular and Mediastinum    Primary hypertension    Relevant Medications    valsartan-hydrochlorothiazide (DIOVAN-HCT) 160-12.5 MG per tablet       Musculoskeletal and Integument    Generalized arthritis    Relevant Medications    meloxicam (MOBIC) 15 mg tablet       Other    Anxiety and depression    Relevant Medications    PARoxetine (PAXIL) 20 mg tablet            Reason for visit is   Chief Complaint   Patient presents with   • Hypertension   • Virtual Regular Visit          Encounter provider Juju Mustafa MD    Provider located at [facility]  [address]  [facility] [address] [phone]      Recent Visits  No visits were found meeting these conditions.  Showing recent visits within past 7 days and meeting all other requirements  Today's Visits  Date Type Provider Dept   02/13/24 Telemedicine Juju Mustafa MD [facility]   Showing today's visits and meeting all other requirements  Future Appointments  No visits were found meeting these conditions.  Showing future appointments within next 150 days and meeting all other requirements       The patient was identified by name and date of birth. Anusha Mchugh was informed that this is a telemedicine visit and that the visit is being conducted through the Epic Embedded platform. She agrees to proceed..  My office door was closed. No one else was in the room.  She acknowledged consent and understanding of privacy and security of the video platform. The patient has agreed to participate and understands they can  "discontinue the visit at any time.    Patient is aware this is a billable service.     Subjective  Anusha Mchugh is a 68 y.o. female medicare wellness .      HPI     Past Medical History:   Diagnosis Date   • Anxiety    • Depression    • Hypertension        Past Surgical History:   Procedure Laterality Date   • EYE SURGERY      cataract   • KNEE SURGERY         Current Outpatient Medications   Medication Sig Dispense Refill   • albuterol (PROVENTIL HFA,VENTOLIN HFA) 90 mcg/act inhaler Inhale 2 puffs every 6 (six) hours as needed for wheezing 8.5 g 0   • cetirizine (ZyrTEC) 10 mg tablet Take 10 mg by mouth daily     • cholecalciferol (VITAMIN D3) 1,000 units tablet Take 2,000 Units by mouth daily     • famotidine (PEPCID) 20 mg tablet Take 1 tablet (20 mg total) by mouth 2 (two) times a day 180 tablet 3   • meloxicam (MOBIC) 15 mg tablet Take 1 tablet (15 mg total) by mouth daily 90 tablet 0   • multivitamin (THERAGRAN) TABS Take 1 tablet by mouth daily     • PARoxetine (PAXIL) 20 mg tablet Take 1 tablet (20 mg total) by mouth daily 90 tablet 0   • triamcinolone (KENALOG) 0.1 % ointment Apply to affected area twice daily for 14 days as needed. May repeat course after one week break. DO NOT apply to face, groin, axillae. 30 g 1   • valsartan-hydrochlorothiazide (DIOVAN-HCT) 160-12.5 MG per tablet Take 1 tablet by mouth daily 90 tablet 00     No current facility-administered medications for this visit.        Allergies   Allergen Reactions   • Amoxicillin-Pot Clavulanate Diarrhea       Review of Systems    Video Exam    Vitals:    02/13/24 0937   Weight: 97.1 kg (214 lb)   Height: 5' 2.5\" (1.588 m)       Physical Exam             "

## 2024-02-13 NOTE — PATIENT INSTRUCTIONS
Medicare Preventive Visit Patient Instructions  Thank you for completing your Welcome to Medicare Visit or Medicare Annual Wellness Visit today. Your next wellness visit will be due in one year (2/13/2025).  The screening/preventive services that you may require over the next 5-10 years are detailed below. Some tests may not apply to you based off risk factors and/or age. Screening tests ordered at today's visit but not completed yet may show as past due. Also, please note that scanned in results may not display below.  Preventive Screenings:  Service Recommendations Previous Testing/Comments   Colorectal Cancer Screening  * Colonoscopy    * Fecal Occult Blood Test (FOBT)/Fecal Immunochemical Test (FIT)  * Fecal DNA/Cologuard Test  * Flexible Sigmoidoscopy Age: 45-75 years old   Colonoscopy: every 10 years (may be performed more frequently if at higher risk)  OR  FOBT/FIT: every 1 year  OR  Cologuard: every 3 years  OR  Sigmoidoscopy: every 5 years  Screening may be recommended earlier than age 45 if at higher risk for colorectal cancer. Also, an individualized decision between you and your healthcare provider will decide whether screening between the ages of 76-85 would be appropriate. Colonoscopy: 10/17/2022  FOBT/FIT: 10/17/2022  Cologuard: 10/17/2022  Sigmoidoscopy: 10/17/2022    Screening Current     Breast Cancer Screening Age: 40+ years old  Frequency: every 1-2 years  Not required if history of left and right mastectomy Mammogram: 07/28/2023    Screening Current   Cervical Cancer Screening Between the ages of 21-29, pap smear recommended once every 3 years.   Between the ages of 30-65, can perform pap smear with HPV co-testing every 5 years.   Recommendations may differ for women with a history of total hysterectomy, cervical cancer, or abnormal pap smears in past. Pap Smear: Not on file    Screening Not Indicated   Hepatitis C Screening Once for adults born between 1945 and 1965  More frequently in patients  at high risk for Hepatitis C Hep C Antibody: 02/05/2024    Screening Current   Diabetes Screening 1-2 times per year if you're at risk for diabetes or have pre-diabetes Fasting glucose: 98 mg/dL (2/5/2024)  A1C: No results in last 5 years (No results in last 5 years)  Screening Current   Cholesterol Screening Once every 5 years if you don't have a lipid disorder. May order more often based on risk factors. Lipid panel: 02/05/2024    Screening Current     Other Preventive Screenings Covered by Medicare:  Abdominal Aortic Aneurysm (AAA) Screening: covered once if your at risk. You're considered to be at risk if you have a family history of AAA.  Lung Cancer Screening: covers low dose CT scan once per year if you meet all of the following conditions: (1) Age 55-77; (2) No signs or symptoms of lung cancer; (3) Current smoker or have quit smoking within the last 15 years; (4) You have a tobacco smoking history of at least 20 pack years (packs per day multiplied by number of years you smoked); (5) You get a written order from a healthcare provider.  Glaucoma Screening: covered annually if you're considered high risk: (1) You have diabetes OR (2) Family history of glaucoma OR (3)  aged 50 and older OR (4)  American aged 65 and older  Osteoporosis Screening: covered every 2 years if you meet one of the following conditions: (1) You're estrogen deficient and at risk for osteoporosis based off medical history and other findings; (2) Have a vertebral abnormality; (3) On glucocorticoid therapy for more than 3 months; (4) Have primary hyperparathyroidism; (5) On osteoporosis medications and need to assess response to drug therapy.   Last bone density test (DXA Scan): 05/20/2022.  HIV Screening: covered annually if you're between the age of 15-65. Also covered annually if you are younger than 15 and older than 65 with risk factors for HIV infection. For pregnant patients, it is covered up to 3 times per  pregnancy.    Immunizations:  Immunization Recommendations   Influenza Vaccine Annual influenza vaccination during flu season is recommended for all persons aged >= 6 months who do not have contraindications   Pneumococcal Vaccine   * Pneumococcal conjugate vaccine = PCV13 (Prevnar 13), PCV15 (Vaxneuvance), PCV20 (Prevnar 20)  * Pneumococcal polysaccharide vaccine = PPSV23 (Pneumovax) Adults 19-65 yo with certain risk factors or if 65+ yo  If never received any pneumonia vaccine: recommend Prevnar 20 (PCV20)  Give PCV20 if previously received 1 dose of PCV13 or PPSV23   Hepatitis B Vaccine 3 dose series if at intermediate or high risk (ex: diabetes, end stage renal disease, liver disease)   Respiratory syncytial virus (RSV) Vaccine - COVERED BY MEDICARE PART D  * RSVPreF3 (Arexvy) CDC recommends that adults 60 years of age and older may receive a single dose of RSV vaccine using shared clinical decision-making (SCDM)   Tetanus (Td) Vaccine - COST NOT COVERED BY MEDICARE PART B Following completion of primary series, a booster dose should be given every 10 years to maintain immunity against tetanus. Td may also be given as tetanus wound prophylaxis.   Tdap Vaccine - COST NOT COVERED BY MEDICARE PART B Recommended at least once for all adults. For pregnant patients, recommended with each pregnancy.   Shingles Vaccine (Shingrix) - COST NOT COVERED BY MEDICARE PART B  2 shot series recommended in those 19 years and older who have or will have weakened immune systems or those 50 years and older     Health Maintenance Due:      Topic Date Due   • Breast Cancer Screening: Mammogram  07/28/2024   • Colorectal Cancer Screening  10/17/2032   • Hepatitis C Screening  Completed     Immunizations Due:      Topic Date Due   • Pneumococcal Vaccine: 65+ Years (2 - PCV) 12/15/2022   • COVID-19 Vaccine (7 - 2023-24 season) 12/08/2023     Advance Directives   What are advance directives?  Advance directives are legal documents that  state your wishes and plans for medical care. These plans are made ahead of time in case you lose your ability to make decisions for yourself. Advance directives can apply to any medical decision, such as the treatments you want, and if you want to donate organs.   What are the types of advance directives?  There are many types of advance directives, and each state has rules about how to use them. You may choose a combination of any of the following:  Living will:  This is a written record of the treatment you want. You can also choose which treatments you do not want, which to limit, and which to stop at a certain time. This includes surgery, medicine, IV fluid, and tube feedings.   Durable power of  for healthcare (DPAHC):  This is a written record that states who you want to make healthcare choices for you when you are unable to make them for yourself. This person, called a proxy, is usually a family member or a friend. You may choose more than 1 proxy.  Do not resuscitate (DNR) order:  A DNR order is used in case your heart stops beating or you stop breathing. It is a request not to have certain forms of treatment, such as CPR. A DNR order may be included in other types of advance directives.  Medical directive:  This covers the care that you want if you are in a coma, near death, or unable to make decisions for yourself. You can list the treatments you want for each condition. Treatment may include pain medicine, surgery, blood transfusions, dialysis, IV or tube feedings, and a ventilator (breathing machine).  Values history:  This document has questions about your views, beliefs, and how you feel and think about life. This information can help others choose the care that you would choose.  Why are advance directives important?  An advance directive helps you control your care. Although spoken wishes may be used, it is better to have your wishes written down. Spoken wishes can be misunderstood, or not  followed. Treatments may be given even if you do not want them. An advance directive may make it easier for your family to make difficult choices about your care.   Urinary Incontinence   Urinary incontinence (UI)  is when you lose control of your bladder. UI develops because your bladder cannot store or empty urine properly. The 3 most common types of UI are stress incontinence, urge incontinence, or both.  Medicines:   May be given to help strengthen your bladder control. Report any side effects of medication to your healthcare provider.  Do pelvic muscle exercises often:  Your pelvic muscles help you stop urinating. Squeeze these muscles tight for 5 seconds, then relax for 5 seconds. Gradually work up to squeezing for 10 seconds. Do 3 sets of 15 repetitions a day, or as directed. This will help strengthen your pelvic muscles and improve bladder control.  Train your bladder:  Go to the bathroom at set times, such as every 2 hours, even if you do not feel the urge to go. You can also try to hold your urine when you feel the urge to go. For example, hold your urine for 5 minutes when you feel the urge to go. As that becomes easier, hold your urine for 10 minutes.   Self-care:   Keep a UI record.  Write down how often you leak urine and how much you leak. Make a note of what you were doing when you leaked urine.  Drink liquids as directed. You may need to limit the amount of liquid you drink to help control your urine leakage. Do not drink any liquid right before you go to bed. Limit or do not have drinks that contain caffeine or alcohol.   Prevent constipation.  Eat a variety of high-fiber foods. Good examples are high-fiber cereals, beans, vegetables, and whole-grain breads. Walking is the best way to trigger your intestines to have a bowel movement.  Exercise regularly and maintain a healthy weight.  Weight loss and exercise will decrease pressure on your bladder and help you control your leakage.   Use a catheter  as directed  to help empty your bladder. A catheter is a tiny, plastic tube that is put into your bladder to drain your urine.   Go to behavior therapy as directed.  Behavior therapy may be used to help you learn to control your urge to urinate.    Weight Management   Why it is important to manage your weight:  Being overweight increases your risk of health conditions such as heart disease, high blood pressure, type 2 diabetes, and certain types of cancer. It can also increase your risk for osteoarthritis, sleep apnea, and other respiratory problems. Aim for a slow, steady weight loss. Even a small amount of weight loss can lower your risk of health problems.  How to lose weight safely:  A safe and healthy way to lose weight is to eat fewer calories and get regular exercise. You can lose up about 1 pound a week by decreasing the number of calories you eat by 500 calories each day.   Healthy meal plan for weight management:  A healthy meal plan includes a variety of foods, contains fewer calories, and helps you stay healthy. A healthy meal plan includes the following:  Eat whole-grain foods more often.  A healthy meal plan should contain fiber. Fiber is the part of grains, fruits, and vegetables that is not broken down by your body. Whole-grain foods are healthy and provide extra fiber in your diet. Some examples of whole-grain foods are whole-wheat breads and pastas, oatmeal, brown rice, and bulgur.  Eat a variety of vegetables every day.  Include dark, leafy greens such as spinach, kale, denice greens, and mustard greens. Eat yellow and orange vegetables such as carrots, sweet potatoes, and winter squash.   Eat a variety of fruits every day.  Choose fresh or canned fruit (canned in its own juice or light syrup) instead of juice. Fruit juice has very little or no fiber.  Eat low-fat dairy foods.  Drink fat-free (skim) milk or 1% milk. Eat fat-free yogurt and low-fat cottage cheese. Try low-fat cheeses such as  mozzarella and other reduced-fat cheeses.  Choose meat and other protein foods that are low in fat.  Choose beans or other legumes such as split peas or lentils. Choose fish, skinless poultry (chicken or turkey), or lean cuts of red meat (beef or pork). Before you cook meat or poultry, cut off any visible fat.   Use less fat and oil.  Try baking foods instead of frying them. Add less fat, such as margarine, sour cream, regular salad dressing and mayonnaise to foods. Eat fewer high-fat foods. Some examples of high-fat foods include french fries, doughnuts, ice cream, and cakes.  Eat fewer sweets.  Limit foods and drinks that are high in sugar. This includes candy, cookies, regular soda, and sweetened drinks.  Exercise:  Exercise at least 30 minutes per day on most days of the week. Some examples of exercise include walking, biking, dancing, and swimming. You can also fit in more physical activity by taking the stairs instead of the elevator or parking farther away from stores. Ask your healthcare provider about the best exercise plan for you.      © Copyright [employer] 2018 Information is for End User's use only and may not be sold, redistributed or otherwise used for commercial purposes. All illustrations and images included in CareNotes® are the copyrighted property of [employer] or [employer]

## 2024-02-16 ENCOUNTER — OFFICE VISIT (OUTPATIENT)
Dept: FAMILY MEDICINE CLINIC | Facility: CLINIC | Age: 69
End: 2024-02-16
Payer: COMMERCIAL

## 2024-02-16 VITALS
OXYGEN SATURATION: 96 % | BODY MASS INDEX: 40.37 KG/M2 | TEMPERATURE: 98.5 F | HEART RATE: 68 BPM | DIASTOLIC BLOOD PRESSURE: 80 MMHG | HEIGHT: 62 IN | RESPIRATION RATE: 16 BRPM | WEIGHT: 219.4 LBS | SYSTOLIC BLOOD PRESSURE: 126 MMHG

## 2024-02-16 DIAGNOSIS — E66.01 CLASS 3 SEVERE OBESITY DUE TO EXCESS CALORIES WITH SERIOUS COMORBIDITY AND BODY MASS INDEX (BMI) OF 40.0 TO 44.9 IN ADULT (HCC): ICD-10-CM

## 2024-02-16 DIAGNOSIS — G47.30 SLEEP APNEA TREATED WITH CONTINUOUS POSITIVE AIRWAY PRESSURE (CPAP): ICD-10-CM

## 2024-02-16 DIAGNOSIS — Z71.2 ENCOUNTER TO DISCUSS TEST RESULTS: Primary | ICD-10-CM

## 2024-02-16 DIAGNOSIS — H91.91 DECREASED HEARING OF RIGHT EAR: ICD-10-CM

## 2024-02-16 DIAGNOSIS — E55.9 VITAMIN D DEFICIENCY: ICD-10-CM

## 2024-02-16 PROCEDURE — 99214 OFFICE O/P EST MOD 30 MIN: CPT

## 2024-02-16 NOTE — ASSESSMENT & PLAN NOTE
Most recent level of 28 on 2/5/24 continue vit D 2000 units daily and encourage foods rich in calcium and vit D.

## 2024-02-16 NOTE — ASSESSMENT & PLAN NOTE
Pt has been using Cpap for greater than 10 years and needs new machine. Referral placed to sleep medicine for new evaluation.

## 2024-02-16 NOTE — ASSESSMENT & PLAN NOTE
Pt reports progressive decreased hearing in R ear states listened to loud music for several years during young adulthood. Exam of R ear was benign will refer to audiology.

## 2024-02-16 NOTE — PROGRESS NOTES
Name: Anusha Mchugh      : 1955      MRN: [id number]  Encounter Provider: JODEE Granados  Encounter Date: 2024   Encounter department: [facility] MEDICINE    Assessment & Plan     1. Encounter to discuss test results    2. Class 3 severe obesity due to excess calories with serious comorbidity and body mass index (BMI) of 40.0 to 44.9 in adult (HCC)  Assessment & Plan:  Counseled on regular exercise at least 150 min /week and healthy eating habits.      3. Vitamin D deficiency  Assessment & Plan:  Most recent level of 28 on 24 continue vit D 2000 units daily and encourage foods rich in calcium and vit D.      4. Sleep apnea treated with continuous positive airway pressure (CPAP)  Assessment & Plan:  Pt has been using Cpap for greater than 10 years and needs new machine. Referral placed to sleep medicine for new evaluation.     Orders:  -     Ambulatory Referral to Sleep Medicine; Future    5. Decreased hearing of right ear  Assessment & Plan:  Pt reports progressive decreased hearing in R ear states listened to loud music for several years during young adulthood. Exam of R ear was benign will refer to audiology.    Orders:  -     Ambulatory Referral to Audiology; Future           Subjective      Pt here to f/u on results of labs done, educate Vit D levels showed insufficiency continue Vit D 2000 units daily  and discussed vit D and calcium rich foods. Triglycerides were elevated with low HDL, encourage weight loss, increased fiber and regular exercise. Urinalysis was normal.    Pt has had Cpap since  sates machine no longer working, will refer to sleep     Decreased hearing in R ear over several years, states listened to loud music for several years       Review of Systems   Constitutional:  Negative for activity change, appetite change, chills, diaphoresis, fatigue and fever.   HENT:  Positive for hearing loss (decreased hearing R ear). Negative for  congestion, drooling, ear discharge, ear pain, nosebleeds, postnasal drip, rhinorrhea, sinus pressure, sinus pain, sore throat, tinnitus, trouble swallowing and voice change.    Eyes:  Negative for photophobia, pain, discharge, redness and visual disturbance.   Respiratory:  Negative for cough, chest tightness, shortness of breath and wheezing.    Cardiovascular:  Negative for chest pain, palpitations and leg swelling.   Gastrointestinal:  Negative for abdominal distention, abdominal pain, blood in stool, constipation, diarrhea, nausea, rectal pain and vomiting.   Endocrine: Negative for cold intolerance, heat intolerance, polydipsia, polyphagia and polyuria.   Genitourinary:  Negative for decreased urine volume, difficulty urinating, dysuria, flank pain, genital sores, hematuria, menstrual problem, pelvic pain, urgency, vaginal discharge and vaginal pain.   Musculoskeletal:  Negative for arthralgias, back pain, gait problem, myalgias, neck pain and neck stiffness.   Skin:  Negative for color change, rash and wound.   Allergic/Immunologic: Negative for environmental allergies, food allergies and immunocompromised state.   Neurological:  Negative for dizziness, tremors, seizures, syncope, facial asymmetry, weakness, light-headedness and numbness.   Hematological:  Negative for adenopathy.   Psychiatric/Behavioral:  Negative for agitation, behavioral problems, confusion, decreased concentration, dysphoric mood, hallucinations, self-injury, sleep disturbance and suicidal ideas. The patient is not nervous/anxious and is not hyperactive.    All other systems reviewed and are negative.      Current Outpatient Medications on File Prior to Visit   Medication Sig    albuterol (PROVENTIL HFA,VENTOLIN HFA) 90 mcg/act inhaler Inhale 2 puffs every 6 (six) hours as needed for wheezing    cetirizine (ZyrTEC) 10 mg tablet Take 10 mg by mouth daily    cholecalciferol (VITAMIN D3) 1,000 units tablet Take 2,000 Units by mouth daily     "famotidine (PEPCID) 20 mg tablet Take 1 tablet (20 mg total) by mouth 2 (two) times a day    meloxicam (MOBIC) 15 mg tablet Take 1 tablet (15 mg total) by mouth daily    multivitamin (THERAGRAN) TABS Take 1 tablet by mouth daily    PARoxetine (PAXIL) 20 mg tablet Take 1 tablet (20 mg total) by mouth daily    triamcinolone (KENALOG) 0.1 % ointment Apply to affected area twice daily for 14 days as needed. May repeat course after one week break. DO NOT apply to face, groin, axillae.    valsartan-hydrochlorothiazide (DIOVAN-HCT) 160-12.5 MG per tablet Take 1 tablet by mouth daily       Objective     /80 (BP Location: Right arm, Patient Position: Sitting, Cuff Size: Large)   Pulse 68   Temp 98.5 °F (36.9 °C) (Tympanic)   Resp 16   Ht 5' 2\" (1.575 m)   Wt 99.5 kg (219 lb 6.4 oz)   SpO2 96%   BMI 40.13 kg/m²     Physical Exam  Vitals and nursing note reviewed.   Constitutional:       General: She is not in acute distress.     Appearance: Normal appearance. She is well-developed and well-groomed. She is obese. She is not ill-appearing or toxic-appearing.   HENT:      Head: Normocephalic and atraumatic.      Right Ear: Tympanic membrane, ear canal and external ear normal. There is no impacted cerumen.      Left Ear: Tympanic membrane, ear canal and external ear normal. There is no impacted cerumen.      Nose: Nose normal. No congestion or rhinorrhea.      Mouth/Throat:      Mouth: Mucous membranes are moist.      Pharynx: No oropharyngeal exudate or posterior oropharyngeal erythema.   Eyes:      General:         Right eye: No discharge.         Left eye: No discharge.      Extraocular Movements: Extraocular movements intact.      Conjunctiva/sclera: Conjunctivae normal.      Pupils: Pupils are equal, round, and reactive to light.   Neck:      Vascular: No carotid bruit.   Cardiovascular:      Rate and Rhythm: Normal rate and regular rhythm.      Pulses: Normal pulses.      Heart sounds: Normal heart sounds. No " murmur heard.  Pulmonary:      Effort: Pulmonary effort is normal. No respiratory distress.      Breath sounds: Normal breath sounds. No wheezing.   Chest:      Chest wall: No tenderness.   Abdominal:      General: Bowel sounds are normal. There is no distension.      Palpations: Abdomen is soft. There is no mass.      Tenderness: There is no abdominal tenderness. There is no right CVA tenderness or left CVA tenderness.   Musculoskeletal:         General: No swelling or tenderness. Normal range of motion.      Cervical back: Normal range of motion. No rigidity or tenderness.      Right lower leg: No edema.      Left lower leg: No edema.   Lymphadenopathy:      Cervical: No cervical adenopathy.   Skin:     General: Skin is warm.      Capillary Refill: Capillary refill takes less than 2 seconds.      Coloration: Skin is not jaundiced.      Findings: No bruising, erythema or rash.   Neurological:      General: No focal deficit present.      Mental Status: She is alert and oriented to person, place, and time.      Cranial Nerves: No cranial nerve deficit.      Sensory: No sensory deficit.      Coordination: Coordination normal.   Psychiatric:         Mood and Affect: Mood normal.         Behavior: Behavior normal. Behavior is cooperative.         Thought Content: Thought content normal.       JODEE Granados

## 2024-03-01 ENCOUNTER — OFFICE VISIT (OUTPATIENT)
Dept: SLEEP CENTER | Facility: CLINIC | Age: 69
End: 2024-03-01
Payer: COMMERCIAL

## 2024-03-01 VITALS
BODY MASS INDEX: 40.3 KG/M2 | HEIGHT: 62 IN | DIASTOLIC BLOOD PRESSURE: 82 MMHG | SYSTOLIC BLOOD PRESSURE: 125 MMHG | WEIGHT: 219 LBS

## 2024-03-01 DIAGNOSIS — J45.20 MILD INTERMITTENT ASTHMA WITHOUT COMPLICATION: ICD-10-CM

## 2024-03-01 DIAGNOSIS — E66.01 MORBID OBESITY (HCC): ICD-10-CM

## 2024-03-01 DIAGNOSIS — F41.9 ANXIETY AND DEPRESSION: ICD-10-CM

## 2024-03-01 DIAGNOSIS — G47.30 SLEEP APNEA TREATED WITH CONTINUOUS POSITIVE AIRWAY PRESSURE (CPAP): Primary | ICD-10-CM

## 2024-03-01 DIAGNOSIS — F32.A ANXIETY AND DEPRESSION: ICD-10-CM

## 2024-03-01 DIAGNOSIS — F12.90 MARIJUANA USE: ICD-10-CM

## 2024-03-01 DIAGNOSIS — I10 PRIMARY HYPERTENSION: ICD-10-CM

## 2024-03-01 PROCEDURE — 99204 OFFICE O/P NEW MOD 45 MIN: CPT | Performed by: INTERNAL MEDICINE

## 2024-03-01 PROCEDURE — G2211 COMPLEX E/M VISIT ADD ON: HCPCS | Performed by: INTERNAL MEDICINE

## 2024-03-01 NOTE — PATIENT INSTRUCTIONS

## 2024-03-01 NOTE — PROGRESS NOTES
Consultation - Sleep Center   Anusha Mchugh  68 y.o. female  :1955  MRN:[id number]  DOS:3/1/2024    Physician Requesting Consult: Sharan Patel*             Reason for Consult : At your kind request I saw Anusha Mchugh for initial sleep evaluation today.  She was diagnosed with obstructive sleep apnea in the past and prescribed CPAP.  She is here to establish care.      She was diagnosed in  and changed her machine once since then.  Currently using a ResMed machine that is more than 5 years old and she is getting a message that it needs to be changed.  She is not sure of the pressure setting on the machine and did not bring it along for data download.  Results of prior studies are no longer available.    PFSH, Problem List, Medications & Allergies were reviewed in EMR.    Anusha  has a past medical history of Anxiety, Depression, and Hypertension.      She has a current medication list which includes the following prescription(s): albuterol, cetirizine, cholecalciferol, famotidine, meloxicam, multivitamin, paroxetine, triamcinolone, and valsartan-hydrochlorothiazide.      HPI: She is using CPAP regularly and benefits from use.  She reports dry nose and mouth.  She is using an ultraviolet device to clean her house.  Anusha reports no snoring or breathing difficulty when using CPAP.  Other complaints: None. Restless Leg Syndrome: reports no suggestive symptoms.    Parasomnia: reports teeth grinding during sleep;   Sleep Routine (averaged): Typical Bedtime: 10-11 PM.  Gets OOB: 8-9 AM. TIB:>9 hrs.   Sleep latency:<  30 minutes; Sleep Interruptions:  infrequent,  because of nocturia and able to fall back asleep. Awakens: spontaneously  Upon awakening: Usually feels refreshed.  She estimates getting 8 hrs sleep.  Daytime Function:Anusha denies excessive daytime sleepiness. She rated herself at Total score: 3 /24 on the [place] Sleepiness Scale.     Habits:   reports that she has  "never smoked. She has never used smokeless tobacco.;  reports current alcohol use.; Reports current drug use. Drug: Marijuana.;  For anxiety E-Cigarette/Vaping  Never User; Caffeine use:limited; Exercise routine: regular.    Occupation: Retired   Family History: Sister has obstructive sleep apnea  ROS: Significant for weight has been stable.  She is reporting no nasal symptoms.  Asthma and acid reflux are controlled.. She is reporting no cardiac symptoms.  She feels mood is stable on Paxil    EXAM:  /82 (BP Location: Left arm, Patient Position: Sitting, Cuff Size: Large)   Ht 5' 2\" (1.575 m)   Wt 99.3 kg (219 lb)   BMI 40.06 kg/m²    General: Well groomed female, well appearing, in no apparent distress.   Neurological: Alert and orientated; cooperative; Cranial nerves intact;    Psychiatric: Speech: Clear and coherent; normal mood, affect & thought   Skin: Warm and dry; Color& Hydration good; no facial rashes or lesions   HEENT:  Craniofacial anatomy: normal Sinuses: Non-tender. TMJ: Normal    Eyes: EOM's intact; conjunctiva/corneas clear   Ears: Appear normal     Nasal Airway: is patent Septum: Intact; Turbinates: Normal; Rhinorrhea: None  Mouth: Lips: Normal posture; Dentition: normal . Mucosa: Moist; Hard Palate:normal    Oropharryx: crowded and AP narrowing Tongue: Mallampati:Class IV, Mobile, and MacroglossiaSoft Palate:  redundant  and Uvular Hypertrophy Tonsils: absent  Neck: Is thick; neck Circumference: 16 \"; supple; no abnormal masses; Thyroid: Normal. Trachea: Central.    Lymph: No cervical or submandibular Lymhadenopathy  Heart: S1,S2 normal; RRR; no gallop; no murmur   Lungs: Respiratory Effort: Normal. Air entry good bilaterally.  No wheezes.  No rales  Abdomen: Obese, soft & non-tender    Extremities: No pedal edema.  No clubbing or cyanosis.    Musculoskeletal:  Motor normal; Gait: Normal.       Last CMP demonstrated elevated CO2 at 29 mmol/L and otherwise unremarkable.  CBC was " "normal    IMPRESSION: Primary/Secondary Sleep Diagnoses (to Medical or Psych conditions) & Comorbidities   1. Sleep apnea treated with continuous positive airway pressure (CPAP)  Ambulatory Referral to Sleep Medicine    Split Study      2. Primary hypertension  Split Study      3. Mild intermittent asthma without complication        4. Anxiety and depression        5. Marijuana use        6. Morbid obesity (HCC)             PLAN:   1. With respect to above conditions, comprehensive counseling provided on pathophysiology; effects on symptoms and comorbidities, diagnostic strategies & limitations; treatment options; risks or no treament; risks & benefits of the various therapeutic options; costs and insurance aspects.     2. I advised weight reduction, avoiding sleeping supine, using alcohol or sedating medications close to bed time and on safe driving practices.    3.  Repeat sleep testing is indicated and and since she is willing to continue CPAP, a split study will be scheduled.  She was instructed to discontinue CPAP for few days prior to the study but thinks she will not be able to.  4.  Follow-up to be scheduled after testing initiation of therapy to review results, further details of treatment options and to adjust therapy.    Sincerely,      Authenticated electronically on 03/01/24   Board Certified Specialist     Portions of the record may have been created with voice recognition software. Occasional wrong word or \"sound a like\" substitutions may have occurred due to the inherent limitations of voice recognition software. There may also be notations and random deletions of words or characters from malfunctioning software. Read the chart carefully and recognize, using context, where substitutions/deletions have occurred.     "

## 2024-03-05 ENCOUNTER — HOSPITAL ENCOUNTER (OUTPATIENT)
Dept: SLEEP CENTER | Facility: CLINIC | Age: 69
Discharge: HOME | End: 2024-03-05
Payer: COMMERCIAL

## 2024-03-05 DIAGNOSIS — I10 PRIMARY HYPERTENSION: ICD-10-CM

## 2024-03-05 DIAGNOSIS — G47.30 SLEEP APNEA TREATED WITH CONTINUOUS POSITIVE AIRWAY PRESSURE (CPAP): ICD-10-CM

## 2024-03-05 PROCEDURE — 95810 POLYSOM 6/> YRS 4/> PARAM: CPT

## 2024-03-06 NOTE — PROGRESS NOTES
Sleep Study Documentation    Pre-Sleep Study       Sleep testing procedure explained to patient:YES    Patient napped prior to study:NO    Caffeine:Dayshift worker after 12PM.  Caffeine use:NO    Alcohol:Dayshift workers after 5PM: Alcohol use:NO    Typical day for patient:YES       Study Documentation    Sleep Study Indications: Requalify for new machine    Sleep Study: Diagnostic   Study was ordered as a split night but due to increased WASO and lack of clinically significant AHI Diagnostic study was performed  Snore:Mild  Supplemental O2: no    O2 flow rate (L/min) range NA  O2 flow rate (L/min) final NA  Minimum SaO2 89  Baseline SaO2 92    Mode of Therapy: NA    EKG abnormalities: no     EEG abnormalities: no    Were abnormal behaviors in sleep observed:NO    Is Total Sleep Study Recording Time < 2 hours: N/A    Is Total Sleep Study Recording Time > 2 hours but study is incomplete: N/A    Is Total Sleep Study Recording Time 6 hours or more but sleep was not obtained: NO    Patient classification: retired       Post-Sleep Study    Medication used at bedtime or during sleep study:NO    Patient reports time it took to fall asleep:greater than 60 minutes    Patient reports waking up during study:3 or more times.  Patient reports returning to sleep in greater than 30 minutes.    Patient reports sleeping less than 2 hours without dreaming.    Does the Patient feel this is a typical night of sleep:worse than usual    Patient rated sleepiness: Somewhat sleepy or tired    PAP treatment:no.

## 2024-03-08 ENCOUNTER — TELEPHONE (OUTPATIENT)
Dept: SLEEP CENTER | Facility: CLINIC | Age: 69
End: 2024-03-08

## 2024-03-08 NOTE — TELEPHONE ENCOUNTER
Diagnostic sleep study resulted, does not indicate LOUISE, does show snoring with upper airway resistance, severe sleep fragmentation and sleep maintenance insomnia.  Note: this was ordered as a split study, however patient did not meet criteria to split.     Returned call from patient, advised of above.  Patient states she did not sleep well during study and does not sleep well without CPAP.    Patient scheduled for follow up with Dr. Loomis 3/11/2024 in [place].  Patient advised to bring current CPAP machine and power cord to appointment for data download.  Patient verbalized understanding.

## 2024-03-11 ENCOUNTER — OFFICE VISIT (OUTPATIENT)
Dept: SLEEP CENTER | Facility: CLINIC | Age: 69
End: 2024-03-11
Payer: COMMERCIAL

## 2024-03-11 VITALS
HEIGHT: 62 IN | HEART RATE: 70 BPM | DIASTOLIC BLOOD PRESSURE: 78 MMHG | SYSTOLIC BLOOD PRESSURE: 128 MMHG | OXYGEN SATURATION: 98 % | BODY MASS INDEX: 40.06 KG/M2 | RESPIRATION RATE: 18 BRPM

## 2024-03-11 DIAGNOSIS — F12.90 MARIJUANA USE: ICD-10-CM

## 2024-03-11 DIAGNOSIS — J45.20 MILD INTERMITTENT ASTHMA WITHOUT COMPLICATION: ICD-10-CM

## 2024-03-11 DIAGNOSIS — E66.01 MORBID OBESITY (HCC): ICD-10-CM

## 2024-03-11 DIAGNOSIS — F41.9 ANXIETY AND DEPRESSION: ICD-10-CM

## 2024-03-11 DIAGNOSIS — I10 PRIMARY HYPERTENSION: ICD-10-CM

## 2024-03-11 DIAGNOSIS — G47.30 SLEEP APNEA TREATED WITH CONTINUOUS POSITIVE AIRWAY PRESSURE (CPAP): Primary | ICD-10-CM

## 2024-03-11 DIAGNOSIS — F32.A ANXIETY AND DEPRESSION: ICD-10-CM

## 2024-03-11 PROCEDURE — G2211 COMPLEX E/M VISIT ADD ON: HCPCS | Performed by: INTERNAL MEDICINE

## 2024-03-11 PROCEDURE — 99214 OFFICE O/P EST MOD 30 MIN: CPT | Performed by: INTERNAL MEDICINE

## 2024-03-11 NOTE — PATIENT INSTRUCTIONS

## 2024-03-11 NOTE — PROGRESS NOTES
Follow-Up Note - Sleep Center   Anusha Mchugh  68 y.o. female  :1955  MRN:[id number]  DOS:3/11/2024    CC: I saw this patient for follow-up in clinic today for Sleep disordered breathing, Coexisting Sleep and Medical Problems.  She is using a ResMed machine at home that is more than 5 years old and needs to be replaced.  Interval changes: Patient recently had a diagnostic sleep study to justify replacement of her equipment and is here to review results / treatment options.      The study demonstrated obstructive sleep apnea:  an apnea/hypopnea index (AHI) of 1.9 events per hour of sleep.  The AHI in the supine position was 1.8.  The AHI during REM sleep was N/A.  Mild intermittent snoring was noted.  The amount of sleep time less than or equal to 89% was 1.7 minutes.  The lowest oxygen saturation was 89.0%.  However sleep efficiency was reduced at 38%, there was severe sleep fragmentation and no stage REM    PFSH, Problem List, Medications & Allergies were reviewed in EMR.   She  has a past medical history of Anxiety, Depression, and Hypertension.    She has a current medication list which includes the following prescription(s): albuterol, cetirizine, cholecalciferol, famotidine, meloxicam, multivitamin, paroxetine, triamcinolone, and valsartan-hydrochlorothiazide.    PHYSIOLOGICAL DATA REVIEW : Using PAP > 4 hours/night 100%. Estimated KONSTANTIN 2/hour with pressure of 10.9cm H2O@90th/95th percentile;  INTERPRETATION: Compliance is excellent and she is unable to sleep without the machine.; Pressure setting is:optimal; ;   SUBJECTIVE: With respect to use of PAP, Anusha  is experiencing some adverse effects:dry mouth/throat and dry nose.She derives benefit.  Is satisfied with sleep and daytime function.   Sleep Routine: Anusha reports getting 8-9 hrs sleep; she has no difficulty initiating or maintaining sleep . She arises spontaneously and feels refreshed.Anusha denies excessive daytime sleepiness,.   "She rated herself at Total score: 1 /24 on the [place] Sleepiness Scale.   Other issues: None reported.     Habits: Reports that she has never smoked. She has never used smokeless tobacco.,  Reports current alcohol use.,  Reports current drug use. Drug: Marijuana., Caffeine use:limited; Exercise routine: regular.      ROS: Significant for few pounds weight gain.  Allergies, asthma and acid reflux are controlled.  Mood is stable on current medications.    EXAM: /78   Pulse 70   Resp 18   Ht 5' 2\" (1.575 m)   SpO2 98%   BMI 40.06 kg/m²     Wt Readings from Last 3 Encounters:   03/01/24 99.3 kg (219 lb)   02/16/24 99.5 kg (219 lb 6.4 oz)   02/13/24 97.1 kg (214 lb)      Patient is well groomed; well appearing.   CNS: Alert, orientated, speech clear & coherent  Psych: cooperative and in no distress. Mental state: Appears normal.  H&N: EOMI; NC/AT: No facial pressure marks, no rashes.    Skin/Extrem: col & hydration normal; no edema  Resp: Respiratory effort is normal  Physical findings otherwise essentially unchanged from previous.    IMPRESSION: Problem List Items & Comorbidities Addressed this Visit    1. Sleep apnea treated with continuous positive airway pressure (CPAP)        2. Primary hypertension        3. Mild intermittent asthma without complication        4. Anxiety and depression        5. Marijuana use        6. Morbid obesity (HCC)            PLAN:  I reviewed physiologic data with the patient.  She has made contact with [facility] and will obtain results of her prior studies.  If she is able to obtain this, a repeat study will not be needed.  I discussed treatment options with risks and benefits.  She states she is unable to sleep without CPAP.  If she is unable to obtain results of prior studies, home sleep testing can be attempted.  She was instructed to attempt sleeping without CPAP for few days prior to the study if possible.  Treatment with  PAP is medically necessary and Anusha is " "agreable to continue use.   Care of equipment, methods to improve comfort using PAP and importance of compliance with therapy were discussed.  Pressure setting: change 10-12 cmH2O.    Rx provided to replace supplies and Care coordinated with DME provider.   Discussed strategies for weight reduction.    She will call to schedule home sleep testing if she is unable to obtain results of prior studies.  Follow-up is advised after initiating therapy with a replacement machine to monitor progress, compliance and to adjust therapy.     Thank you for allowing me to participate in the care of this patient.    Sincerely,     Authenticated electronically on 03/11/24   Board Certified Specialist     Portions of the record may have been created with voice recognition software. Occasional wrong word or \"sound a like\" substitutions may have occurred due to the inherent limitations of voice recognition software. There may also be notations and random deletions of words or characters from malfunctioning software. Read the chart carefully and recognize, using context, where substitutions/deletions have occurred.    "

## 2024-03-13 ENCOUNTER — TELEPHONE (OUTPATIENT)
Dept: SLEEP CENTER | Facility: CLINIC | Age: 69
End: 2024-03-13

## 2024-03-13 NOTE — TELEPHONE ENCOUNTER
Compliance follow up scheduled for 7/31/24. Patient needs 31-91 Day Compliance (05/02/2024-07/01/24). RX for CPAP and clinicals sent to [facility] via [employer]

## 2024-03-22 ENCOUNTER — TELEPHONE (OUTPATIENT)
Dept: SLEEP CENTER | Facility: CLINIC | Age: 69
End: 2024-03-22

## 2024-03-26 DIAGNOSIS — G47.30 SLEEP APNEA TREATED WITH CONTINUOUS POSITIVE AIRWAY PRESSURE (CPAP): Primary | ICD-10-CM

## 2024-04-25 ENCOUNTER — TELEPHONE (OUTPATIENT)
Dept: SLEEP CENTER | Facility: CLINIC | Age: 69
End: 2024-04-25

## 2024-04-25 NOTE — TELEPHONE ENCOUNTER
Patient left message stating she has a sleep study scheduled on 5/15/24.  She'd just prefer to get a new CPAP and is wiling to pay out of pocket.     Returned call and spoke to patient.  Explained that in order to qualify for a new CPAP through insurance, she needs a sleep study that diagnoses sleep apnea.  We do not have one on the chart.  Patient was considering paying for a machine out of pocket and trying get reimbursed later on.  Offered to reschedule her sleep study sooner.    Rescheduled home sleep study to 4/28/24 in [place].  Instructions provided.  Instructed to stop using her CPAP starting tonight.  Patient verbalized understanding.

## 2024-04-28 ENCOUNTER — HOSPITAL ENCOUNTER (OUTPATIENT)
Dept: SLEEP CENTER | Facility: CLINIC | Age: 69
Discharge: HOME | End: 2024-04-28
Payer: COMMERCIAL

## 2024-04-28 DIAGNOSIS — G47.30 SLEEP APNEA TREATED WITH CONTINUOUS POSITIVE AIRWAY PRESSURE (CPAP): ICD-10-CM

## 2024-04-28 PROCEDURE — G0399 HOME SLEEP TEST/TYPE 3 PORTA: HCPCS

## 2024-04-28 NOTE — PROGRESS NOTES
Home Sleep Study Documentation    HOME STUDY DEVICE: Noxturnal no                                           Stephani G3 yes      Pre-Sleep Home Study:    Set-up and instructions performed by: KS    Technician performed demonstration for Patient: yes    Return demonstration performed by Patient: yes    Written instructions provided to Patient: yes    Patient signed consent form: yes        Post-Sleep Home Study:    Additional comments by Patient: N/A    Home Sleep Study Failed:no:    Failure reason: N/A    Reported or Detected: N/A    Scored by: SYD Parrish

## 2024-05-01 DIAGNOSIS — G47.33 OSA (OBSTRUCTIVE SLEEP APNEA): Primary | ICD-10-CM

## 2024-05-07 ENCOUNTER — TELEPHONE (OUTPATIENT)
Dept: SLEEP CENTER | Facility: CLINIC | Age: 69
End: 2024-05-07

## 2024-05-07 NOTE — TELEPHONE ENCOUNTER
Sleep study resulted and shows mild sleep apnea.  CPAP ordered.    Patient completed sleep study in order to qualify for a new CPAP machine.    Called patient and left message advising sleep study has been read and CPAP ordered.  Rx will be sent to [facility] today. I will send a [employer] message with the study results and her next steps. Provided nurse line number to call if any questions.     Rx for CPAP and clinicals sent to [facility] via [employer].  Will need to schedule compliance follow up with Dr. Loomis.

## 2024-05-10 LAB

## 2024-05-14 DIAGNOSIS — I10 PRIMARY HYPERTENSION: ICD-10-CM

## 2024-05-14 DIAGNOSIS — M19.90 GENERALIZED ARTHRITIS: ICD-10-CM

## 2024-05-15 RX ORDER — MELOXICAM 15 MG/1
15 TABLET ORAL DAILY
Qty: 90 TABLET | Refills: 1 | Status: SHIPPED | OUTPATIENT
Start: 2024-05-15

## 2024-05-15 RX ORDER — VALSARTAN AND HYDROCHLOROTHIAZIDE 160; 12.5 MG/1; MG/1
1 TABLET, FILM COATED ORAL DAILY
Qty: 90 TABLET | Refills: 1 | Status: SHIPPED | OUTPATIENT
Start: 2024-05-15

## 2024-05-19 DIAGNOSIS — F32.A ANXIETY AND DEPRESSION: ICD-10-CM

## 2024-05-19 DIAGNOSIS — F41.9 ANXIETY AND DEPRESSION: ICD-10-CM

## 2024-05-20 LAB

## 2024-05-20 RX ORDER — PAROXETINE HYDROCHLORIDE 20 MG/1
20 TABLET, FILM COATED ORAL DAILY
Qty: 90 TABLET | Refills: 1 | Status: SHIPPED | OUTPATIENT
Start: 2024-05-20

## 2024-06-26 ENCOUNTER — HOSPITAL ENCOUNTER (OUTPATIENT)
Dept: CT IMAGING | Facility: HOSPITAL | Age: 69
Discharge: HOME | End: 2024-06-26
Attending: OTOLARYNGOLOGY
Payer: COMMERCIAL

## 2024-06-26 DIAGNOSIS — H65.491 CHRONIC OTITIS MEDIA OF RIGHT EAR WITH EFFUSION: ICD-10-CM

## 2024-06-26 PROCEDURE — 70480 CT ORBIT/EAR/FOSSA W/O DYE: CPT

## 2024-07-10 ENCOUNTER — OFFICE VISIT (OUTPATIENT)
Dept: SLEEP CENTER | Facility: CLINIC | Age: 69
End: 2024-07-10
Payer: COMMERCIAL

## 2024-07-10 VITALS — SYSTOLIC BLOOD PRESSURE: 122 MMHG | DIASTOLIC BLOOD PRESSURE: 80 MMHG

## 2024-07-10 DIAGNOSIS — F41.9 ANXIETY AND DEPRESSION: ICD-10-CM

## 2024-07-10 DIAGNOSIS — F32.A ANXIETY AND DEPRESSION: ICD-10-CM

## 2024-07-10 DIAGNOSIS — G47.33 OSA (OBSTRUCTIVE SLEEP APNEA): Primary | ICD-10-CM

## 2024-07-10 DIAGNOSIS — J45.20 MILD INTERMITTENT ASTHMA WITHOUT COMPLICATION: ICD-10-CM

## 2024-07-10 DIAGNOSIS — G47.30 SLEEP APNEA TREATED WITH CONTINUOUS POSITIVE AIRWAY PRESSURE (CPAP): ICD-10-CM

## 2024-07-10 DIAGNOSIS — E66.01 MORBID OBESITY (HCC): ICD-10-CM

## 2024-07-10 DIAGNOSIS — F12.90 MARIJUANA USE: ICD-10-CM

## 2024-07-10 DIAGNOSIS — I10 PRIMARY HYPERTENSION: ICD-10-CM

## 2024-07-10 PROCEDURE — G2211 COMPLEX E/M VISIT ADD ON: HCPCS | Performed by: INTERNAL MEDICINE

## 2024-07-10 PROCEDURE — 99214 OFFICE O/P EST MOD 30 MIN: CPT | Performed by: INTERNAL MEDICINE

## 2024-07-10 NOTE — PROGRESS NOTES
Follow-Up Note - Sleep Center   Anusha Mchugh  69 y.o. female  :1955  MRN:[id number]  DOS:7/10/2024    CC: I saw this patient for follow-up in clinic today for Sleep disordered breathing, Coexisting Sleep and Medical Problems.  Treatment was initiated using a ResMed machine.. Interval changes: Home sleep testing was undertaken to evaluate for sleep disordered breathing and patient is here to review results and further options.     The study demonstrated a respiratory event index (KONSTANTIN) of 8.6.  The lowest SpO2 recorded is 88% and 1.3 minutes during the study was spent with saturations below 90%.  The snore index was 6.2%.    PFSH, Problem List, Medications & Allergies were reviewed in EMR.   She  has a past medical history of Anxiety, Depression, Ear problems, HL (hearing loss) (2023), Hypertension, Nasal congestion, Obesity (N/A), and Sleep apnea (2009).    She has a current medication list which includes the following prescription(s): albuterol, cetirizine, cholecalciferol, famotidine, meloxicam, multivitamin, paroxetine, triamcinolone, and valsartan-hydrochlorothiazide.    PHYSIOLOGICAL DATA REVIEW : Using PAP > 4 hours/night 97%. Estimated KONSTANTIN 1.4/hour with pressure of 11.7cm H2O@90th/95th percentile; patient has not been using non FDA approved devices to sanitize the machine.  INTERPRETATION: Compliance is excellent; Pressure setting is:optimal; ;   SUBJECTIVE: With respect to use of PAP, Anusha  is experiencing some adverse effects:mask leaks .She derives benefit.  Is satisfied with sleep and daytime function.   Sleep Routine: Anusha reports getting >8 hrs sleep; she has no difficulty initiating or maintaining sleep . She arises spontaneously and feels more refreshed since on Rx.Anusha denies excessive daytime sleepiness,.  She rated herself at Total score: (P) 3 /24 on the [place] Sleepiness Scale.   Other issues: TMJ dysfunction that interfered with mask comfort.     Habits:  Reports that she has never smoked. She has never used smokeless tobacco.,  Reports that she does not currently use alcohol.,  Reports current drug use. Drug: Marijuana., Caffeine use:limited; Exercise routine: regular.      ROS: Significant for weight has been stable.  Allergies asthma and acid reflux are controlled.  Mood is stable on Paxil and uses marijuana infrequently..    EXAM: /80     Wt Readings from Last 3 Encounters:   07/09/24 99.3 kg (219 lb)   06/17/24 99.3 kg (219 lb)   06/03/24 99.3 kg (219 lb)      Patient is well groomed; well appearing.   CNS: Alert, orientated, speech clear & coherent  Psych: cooperative and in no distress. Mental state: Appears normal.  H&N: EOMI; NC/AT: No facial pressure marks, no rashes.    Skin/Extrem: col & hydration normal; no edema  Resp: Respiratory effort is normal  Physical findings otherwise essentially unchanged from previous.    IMPRESSION: Problem List Items & Comorbidities Addressed this Visit    1. LOUISE (obstructive sleep apnea)  PAP DME Resupply/Reorder      2. Sleep apnea treated with continuous positive airway pressure (CPAP)        3. Primary hypertension        4. Mild intermittent asthma without complication        5. Anxiety and depression        6. Marijuana use        7. Morbid obesity (HCC)            PLAN:  I reviewed results of prior studies and physiologic data with the patient.   I discussed treatment options with risks and benefits.  Treatment with  PAP is medically necessary and Anusha is agreable to continue use.   Care of equipment, methods to improve comfort using PAP and importance of compliance with therapy were discussed.  Pressure setting: continue 10-12 cmH2O.    Rx provided to replace supplies and Care coordinated with DME provider.   Discussed strategies for weight reduction.    Follow-up is advised in 1 year or sooner if needed to monitor progress, compliance and to adjust therapy.     Thank you for allowing me to participate in  "the care of this patient.    Sincerely,     Authenticated electronically on 07/10/24   Board Certified Specialist     Portions of the record may have been created with voice recognition software. Occasional wrong word or \"sound a like\" substitutions may have occurred due to the inherent limitations of voice recognition software. There may also be notations and random deletions of words or characters from malfunctioning software. Read the chart carefully and recognize, using context, where substitutions/deletions have occurred.    "

## 2024-07-11 ENCOUNTER — TELEPHONE (OUTPATIENT)
Dept: SLEEP CENTER | Facility: CLINIC | Age: 69
End: 2024-07-11

## 2024-07-11 LAB

## 2024-07-15 LAB

## 2024-07-29 ENCOUNTER — VBI (OUTPATIENT)
Dept: ADMINISTRATIVE | Facility: OTHER | Age: 69
End: 2024-07-29

## 2024-10-06 ENCOUNTER — HOSPITAL ENCOUNTER (OUTPATIENT)
Dept: MRI IMAGING | Facility: HOSPITAL | Age: 69
Discharge: HOME | End: 2024-10-06
Payer: COMMERCIAL

## 2024-10-06 DIAGNOSIS — G96.01 CSF OTORRHEA: ICD-10-CM

## 2024-10-06 PROCEDURE — A9585 GADOBUTROL INJECTION: HCPCS | Performed by: FAMILY MEDICINE

## 2024-10-06 PROCEDURE — 70553 MRI BRAIN STEM W/O & W/DYE: CPT

## 2024-10-06 RX ORDER — GADOBUTROL 604.72 MG/ML
9 INJECTION INTRAVENOUS
Status: COMPLETED | OUTPATIENT
Start: 2024-10-06 | End: 2024-10-06

## 2024-10-06 RX ADMIN — GADOBUTROL 9 ML: 604.72 INJECTION INTRAVENOUS at 11:00

## 2024-10-08 ENCOUNTER — CONSULT (OUTPATIENT)
Dept: NEUROSURGERY | Facility: CLINIC | Age: 69
End: 2024-10-08
Payer: COMMERCIAL

## 2024-10-08 VITALS
BODY MASS INDEX: 37.22 KG/M2 | TEMPERATURE: 98.2 F | HEART RATE: 74 BPM | HEIGHT: 64 IN | OXYGEN SATURATION: 98 % | WEIGHT: 218 LBS

## 2024-10-08 DIAGNOSIS — G96.01 CSF OTORRHEA: ICD-10-CM

## 2024-10-08 PROCEDURE — 99205 OFFICE O/P NEW HI 60 MIN: CPT | Performed by: NEUROLOGICAL SURGERY

## 2024-10-08 NOTE — PROGRESS NOTES
Ambulatory Visit  Name: Anusha Mchugh      : 1955      MRN: [id number]  Encounter Provider: Oscar Borjas MD  Encounter Date: 10/8/2024   Encounter department: [facility]UROS[facility]    Assessment & Plan      History of Present Illness     Patient is being directly referred by my ENT colleague Dr. Briseyda Echols, who evaluated the patient on 2024.    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    No new symptoms including fever/chills, HA, nausea/vomiting, change in vision, vertigo, tinnitus, neck stiffness, facial numbness/weakness, gait imbalance, seizure history. No prior lumbar surgery.    No AC/AP.  Non-smoker.    CT temporal bone on 2024 demonstrated extensive middle ear and mastoid opacification on the right a/w tegmen bone defects.    MRI on 10/6/2024 demonstrated no visible encephalocele or intraparenchymal abscess.    At this time, Dr. Echols and I will proceed with scheduling joint case for endoscopic right middle fossa craniotomy for repair of tegmen defect and CSF leak with temporalis fascia or muscle flap, with neuromonitoring (CN 7/8) and intraoperative lumbar drain placement. I have discussed the potential benefits, risks, and alternatives to surgery.     She agrees to proceed, signing consent today. She will need preoperative medical clearance and updated labs.     All questions and concerns were addressed during this visit.    HPI  Review of Systems   Constitutional: Negative.    HENT:  Positive for ear discharge.    Eyes: Negative.    Respiratory: Negative.     Cardiovascular: Negative.    Gastrointestinal: Negative.    Endocrine: Negative.    Genitourinary: Negative.    Musculoskeletal:  Positive for back pain. Negative for myalgias.        CSF otorrhea      C/o clear fluid leaking from right ear.   Denies pain/swelling on right hear   Denies  Headaches/dizziness   No AC/ non-smoker   Skin: Negative.    Allergic/Immunologic: Negative.    Neurological: Negative.    Hematological: Negative.    Psychiatric/Behavioral: Negative.     All other systems reviewed and are negative.    I have personally reviewed the MA's review of systems and made changes as necessary.    Objective     Pulse 74   Temp 98.2 °F (36.8 °C) (Temporal)   SpO2 98%     Physical Exam  Constitutional:       Appearance: Normal appearance.   HENT:      Head: Normocephalic and atraumatic.   Eyes:      Extraocular Movements: Extraocular movements intact.      Pupils: Pupils are equal, round, and reactive to light.   Cardiovascular:      Rate and Rhythm: Normal rate and regular rhythm.      Pulses: Normal pulses.      Heart sounds: Normal heart sounds.   Pulmonary:      Effort: Pulmonary effort is normal.      Breath sounds: Normal breath sounds.   Abdominal:      General: Abdomen is flat.      Palpations: Abdomen is soft.   Musculoskeletal:         General: Normal range of motion.      Cervical back: Normal range of motion.   Skin:     General: Skin is warm.   Neurological:      General: No focal deficit present.      Mental Status: She is alert and oriented to person, place, and time. Mental status is at baseline.      Motor: Motor strength is normal.     Gait: Gait is intact.   Psychiatric:         Mood and Affect: Mood normal.         Speech: Speech normal.         Behavior: Behavior normal.       Neurologic Exam     Mental Status   Oriented to person, place, and time.   Attention: normal. Concentration: normal.   Speech: speech is normal   Level of consciousness: alert    Cranial Nerves     CN III, IV, VI   Pupils are equal, round, and reactive to light.  Unchanged R hearing loss, otherwise CN's intact bilaterally     Motor Exam     Strength   Strength 5/5 throughout.     Sensory Exam   Light touch normal.     Gait, Coordination, and Reflexes     Gait  Gait: normal    Reflexes   Reflexes  2+ except as noted.       I have reviewed the following images/report studies in PACS: MRI brain IAC wo and w contrast    Result Date: 10/7/2024  Normal MRI of the brain and IAC. Right mastoid air cell opacification. Workstation performed: [id number]       Administrative Statements   I have spent a total time of 30 minutes in caring for this patient on the day of the visit/encounter including Diagnostic results, Prognosis, Risks and benefits of tx options, Instructions for management, Patient and family education, Importance of tx compliance, Risk factor reductions, Impressions, Counseling / Coordination of care, Documenting in the medical record, Reviewing / ordering tests, medicine, procedures  , Obtaining or reviewing history  , and Communicating with other healthcare professionals .

## 2024-11-11 ENCOUNTER — NURSE TRIAGE (OUTPATIENT)
Age: 69
End: 2024-11-11

## 2024-11-11 ENCOUNTER — TELEPHONE (OUTPATIENT)
Dept: FAMILY MEDICINE CLINIC | Facility: CLINIC | Age: 69
End: 2024-11-11

## 2024-11-11 ENCOUNTER — OFFICE VISIT (OUTPATIENT)
Dept: FAMILY MEDICINE CLINIC | Facility: CLINIC | Age: 69
End: 2024-11-11
Payer: COMMERCIAL

## 2024-11-11 VITALS
WEIGHT: 218 LBS | OXYGEN SATURATION: 97 % | RESPIRATION RATE: 16 BRPM | TEMPERATURE: 97.9 F | HEIGHT: 64 IN | HEART RATE: 91 BPM | BODY MASS INDEX: 37.22 KG/M2 | SYSTOLIC BLOOD PRESSURE: 130 MMHG | DIASTOLIC BLOOD PRESSURE: 80 MMHG

## 2024-11-11 DIAGNOSIS — U07.1 COVID: ICD-10-CM

## 2024-11-11 DIAGNOSIS — I10 PRIMARY HYPERTENSION: ICD-10-CM

## 2024-11-11 DIAGNOSIS — J06.9 ACUTE URI: ICD-10-CM

## 2024-11-11 DIAGNOSIS — J45.21 MILD INTERMITTENT ASTHMA WITH ACUTE EXACERBATION: Primary | ICD-10-CM

## 2024-11-11 LAB
SARS-COV-2 AG UPPER RESP QL IA: POSITIVE
VALID CONTROL: ABNORMAL

## 2024-11-11 PROCEDURE — 99214 OFFICE O/P EST MOD 30 MIN: CPT | Performed by: FAMILY MEDICINE

## 2024-11-11 PROCEDURE — 94640 AIRWAY INHALATION TREATMENT: CPT | Performed by: FAMILY MEDICINE

## 2024-11-11 PROCEDURE — 87811 SARS-COV-2 COVID19 W/OPTIC: CPT | Performed by: FAMILY MEDICINE

## 2024-11-11 RX ORDER — AZITHROMYCIN 250 MG/1
TABLET, FILM COATED ORAL
Qty: 6 TABLET | Refills: 0 | Status: SHIPPED | OUTPATIENT
Start: 2024-11-11 | End: 2024-11-16

## 2024-11-11 RX ORDER — BENZONATATE 200 MG/1
200 CAPSULE ORAL 3 TIMES DAILY PRN
Qty: 30 CAPSULE | Refills: 0 | Status: SHIPPED | OUTPATIENT
Start: 2024-11-11

## 2024-11-11 RX ORDER — IPRATROPIUM BROMIDE AND ALBUTEROL SULFATE 2.5; .5 MG/3ML; MG/3ML
3 SOLUTION RESPIRATORY (INHALATION) ONCE
Status: COMPLETED | OUTPATIENT
Start: 2024-11-11 | End: 2024-11-11

## 2024-11-11 RX ORDER — PREDNISONE 20 MG/1
40 TABLET ORAL DAILY
Qty: 10 TABLET | Refills: 0 | Status: SHIPPED | OUTPATIENT
Start: 2024-11-11 | End: 2024-11-16

## 2024-11-11 RX ADMIN — IPRATROPIUM BROMIDE AND ALBUTEROL SULFATE 3 ML: 2.5; .5 SOLUTION RESPIRATORY (INHALATION) at 16:46

## 2024-11-11 NOTE — ASSESSMENT & PLAN NOTE
Day 5+ with asthma flare up will treat with albuterol q 4 hrs wear mask 5 more days     Orders:    benzonatate (TESSALON) 200 MG capsule; Take 1 capsule (200 mg total) by mouth 3 (three) times a day as needed for cough

## 2024-11-11 NOTE — ASSESSMENT & PLAN NOTE
Orders:    POCT Rapid Covid Ag    benzonatate (TESSALON) 200 MG capsule; Take 1 capsule (200 mg total) by mouth 3 (three) times a day as needed for cough    azithromycin (ZITHROMAX) 250 mg tablet; Take 2 tablets today then 1 tablet daily x 4 days  viral illness however excessive mucous with cough would be concerned about secondary bronchitis covid cough is usually dry  :cherise lui

## 2024-11-11 NOTE — PROGRESS NOTES
Ambulatory Visit  Name: Anusha Mchugh      : 1955      MRN: [id number]  Encounter Provider: Juju Mustafa MD  Encounter Date: 2024   Encounter department: [facility] MEDICINE    Assessment & Plan  Mild intermittent asthma with acute exacerbation  Duoneb  given  wheezing resolved   Prednisone 40mg po 5 days  Benzonatate 200mg tid #30    Orders:    predniSONE 20 mg tablet; Take 2 tablets (40 mg total) by mouth daily for 5 days    ipratropium-albuterol (DUO-NEB) 0.5-2.5 mg/3 mL inhalation solution 3 mL    azithromycin (ZITHROMAX) 250 mg tablet; Take 2 tablets today then 1 tablet daily x 4 days    Acute URI    Orders:    POCT Rapid Covid Ag    benzonatate (TESSALON) 200 MG capsule; Take 1 capsule (200 mg total) by mouth 3 (three) times a day as needed for cough    azithromycin (ZITHROMAX) 250 mg tablet; Take 2 tablets today then 1 tablet daily x 4 days  viral illness however excessive mucous with cough would be concerned about secondary bronchitis covid cough is usually dry  :z sania  Primary hypertension  Ok today       COVID  Day 5+ with asthma flare up will treat with albuterol q 4 hrs wear mask 5 more days     Orders:    benzonatate (TESSALON) 200 MG capsule; Take 1 capsule (200 mg total) by mouth 3 (three) times a day as needed for cough       History of Present Illness     5 days+  of illness of  mild head and chest congestion with cough  feels sob with exertion does have hx of mild asthma           Review of Systems   Constitutional:  Negative for chills and fever.   HENT:  Negative for congestion, ear pain, mouth sores, rhinorrhea, sinus pressure, sinus pain, sore throat and trouble swallowing.    Eyes:  Negative for discharge.   Respiratory:  Positive for cough, shortness of breath and wheezing. Negative for chest tightness.    Cardiovascular:  Negative for chest pain.   Musculoskeletal:  Negative for arthralgias and myalgias.   Neurological:  Negative for headaches.  "          Objective     /80 (BP Location: Left arm, Patient Position: Sitting, Cuff Size: Large)   Pulse 91   Temp 97.9 °F (36.6 °C) (Tympanic)   Resp 16   Ht 5' 4\" (1.626 m)   Wt 98.9 kg (218 lb)   SpO2 97%   BMI 37.42 kg/m²     Physical Exam  Vitals and nursing note reviewed.   Constitutional:       General: She is not in acute distress.     Appearance: Normal appearance. She is well-developed. She is not ill-appearing.   HENT:      Head: Normocephalic.      Right Ear: Tympanic membrane and ear canal normal.      Left Ear: Tympanic membrane and ear canal normal.      Nose: No mucosal edema, congestion or rhinorrhea.      Right Sinus: No maxillary sinus tenderness or frontal sinus tenderness.      Left Sinus: No maxillary sinus tenderness or frontal sinus tenderness.      Mouth/Throat:      Mouth: Mucous membranes are moist.      Pharynx: Oropharynx is clear. No oropharyngeal exudate or posterior oropharyngeal erythema.   Eyes:      Extraocular Movements: Extraocular movements intact.      Conjunctiva/sclera: Conjunctivae normal.      Pupils: Pupils are equal, round, and reactive to light.   Cardiovascular:      Rate and Rhythm: Normal rate and regular rhythm.      Heart sounds: Normal heart sounds.   Pulmonary:      Effort: Pulmonary effort is normal. No respiratory distress.      Breath sounds: Wheezing present. No rhonchi or rales.   Musculoskeletal:      Cervical back: Normal range of motion and neck supple.   Lymphadenopathy:      Cervical: No cervical adenopathy.   Neurological:      General: No focal deficit present.      Mental Status: She is alert and oriented to person, place, and time.   Psychiatric:         Mood and Affect: Mood normal.         "

## 2024-11-11 NOTE — TELEPHONE ENCOUNTER
"Patient started with URI symptoms yesterday.  Denies fever but endorses chills.  SOB with exertion.  Coughing, chest congestion and runny nose.  Patient would like an appointment to be seen.  Appointment made for today, for evaluation.         Reason for Disposition   MILD difficulty breathing (e.g., minimal/no SOB at rest, SOB with walking, pulse < 100) of new-onset or worse than normal    Answer Assessment - Initial Assessment Questions  1. RESPIRATORY STATUS: \"Describe your breathing?\" (e.g., wheezing, shortness of breath, unable to speak, severe coughing)       Coughing, chest congestion, SOB with exertion.    2. ONSET: \"When did this breathing problem begin?\"       111/10  3. PATTERN \"Does the difficult breathing come and go, or has it been constant since it started?\"       Only when she does anything.   4. SEVERITY: \"How bad is your breathing?\" (e.g., mild, moderate, severe)       Moderate when walking.   5. RECURRENT SYMPTOM: \"Have you had difficulty breathing before?\" If Yes, ask: \"When was the last time?\" and \"What happened that time?\"       Denies  6. CARDIAC HISTORY: \"Do you have any history of heart disease?\" (e.g., heart attack, angina, bypass surgery, angioplasty)       HTN  7. LUNG HISTORY: \"Do you have any history of lung disease?\"  (e.g., pulmonary embolus, asthma, emphysema)      Asthma  8. CAUSE: \"What do you think is causing the breathing problem?\"       Unknown   9. OTHER SYMPTOMS: \"Do you have any other symptoms?\" (e.g., chest pain, cough, dizziness, fever, runny nose)      Cough, chest congestion, runny nose, SOB, Chills last night,   10. O2 SATURATION MONITOR:  \"Do you use an oxygen saturation monitor (pulse oximeter) at home?\" If Yes, ask: \"What is your reading (oxygen level) today?\" \"What is your usual oxygen saturation reading?\" (e.g., 95%)        N/A  11. PREGNANCY: \"Is there any chance you are pregnant?\" \"When was your last menstrual period?\"        N/A  12. TRAVEL: \"Have you traveled " "out of the country in the last month?\" (e.g., travel history, exposures)        N/A    Protocols used: Breathing Difficulty-Adult-[place]    "

## 2024-11-11 NOTE — ASSESSMENT & PLAN NOTE
Duoneb  given  wheezing resolved   Prednisone 40mg po 5 days  Benzonatate 200mg tid #30    Orders:    predniSONE 20 mg tablet; Take 2 tablets (40 mg total) by mouth daily for 5 days    ipratropium-albuterol (DUO-NEB) 0.5-2.5 mg/3 mL inhalation solution 3 mL    azithromycin (ZITHROMAX) 250 mg tablet; Take 2 tablets today then 1 tablet daily x 4 days

## 2024-11-11 NOTE — TELEPHONE ENCOUNTER
Patient had an appt for pre op clearance with Dr Andres on 11/21.  I rescheduled this (for the reason pcp need to see their own patients for pre op).  I LM on patients VM letting her know that appt has been changed to be with Dr Mustafa on 12/3 at 4:15 (BUT I had to use a TCM slot, as there was nothing else available & surgery is 12/16.  OK?

## 2024-11-20 ENCOUNTER — LAB REQUISITION (OUTPATIENT)
Dept: LAB | Facility: HOSPITAL | Age: 69
End: 2024-11-20
Payer: COMMERCIAL

## 2024-11-20 ENCOUNTER — APPOINTMENT (OUTPATIENT)
Dept: LAB | Facility: HOSPITAL | Age: 69
End: 2024-11-20
Attending: NEUROLOGICAL SURGERY
Payer: COMMERCIAL

## 2024-11-20 DIAGNOSIS — M19.90 GENERALIZED ARTHRITIS: ICD-10-CM

## 2024-11-20 DIAGNOSIS — I10 PRIMARY HYPERTENSION: ICD-10-CM

## 2024-11-20 DIAGNOSIS — G96.01 CSF OTORRHEA: ICD-10-CM

## 2024-11-20 DIAGNOSIS — Z01.818 ENCOUNTER FOR OTHER PREPROCEDURAL EXAMINATION: ICD-10-CM

## 2024-11-20 DIAGNOSIS — Z01.818 PRE-PROCEDURAL EXAMINATION: ICD-10-CM

## 2024-11-20 LAB
ABO GROUP BLD: NORMAL
ALBUMIN SERPL BCG-MCNC: 3.9 G/DL (ref 3.5–5)
ALP SERPL-CCNC: 60 U/L (ref 34–104)
ALT SERPL W P-5'-P-CCNC: 13 U/L (ref 7–52)
ANION GAP SERPL CALCULATED.3IONS-SCNC: 11 MMOL/L (ref 4–13)
APTT PPP: 26 SECONDS (ref 23–34)
AST SERPL W P-5'-P-CCNC: 17 U/L (ref 13–39)
BACTERIA UR QL AUTO: ABNORMAL /HPF
BASOPHILS # BLD AUTO: 0.05 THOUSANDS/ÂΜL (ref 0–0.1)
BASOPHILS NFR BLD AUTO: 1 % (ref 0–1)
BILIRUB SERPL-MCNC: 0.59 MG/DL (ref 0.2–1)
BILIRUB UR QL STRIP: NEGATIVE
BLD GP AB SCN SERPL QL: NEGATIVE
BUN SERPL-MCNC: 14 MG/DL (ref 5–25)
CALCIUM SERPL-MCNC: 8.8 MG/DL (ref 8.4–10.2)
CHLORIDE SERPL-SCNC: 100 MMOL/L (ref 96–108)
CLARITY UR: CLEAR
CO2 SERPL-SCNC: 26 MMOL/L (ref 21–32)
COLOR UR: YELLOW
CREAT SERPL-MCNC: 0.56 MG/DL (ref 0.6–1.3)
EOSINOPHIL # BLD AUTO: 0.32 THOUSAND/ÂΜL (ref 0–0.61)
EOSINOPHIL NFR BLD AUTO: 4 % (ref 0–6)
ERYTHROCYTE [DISTWIDTH] IN BLOOD BY AUTOMATED COUNT: 13.1 % (ref 11.6–15.1)
EST. AVERAGE GLUCOSE BLD GHB EST-MCNC: 128 MG/DL
GFR SERPL CREATININE-BSD FRML MDRD: 96 ML/MIN/1.73SQ M
GLUCOSE P FAST SERPL-MCNC: 119 MG/DL (ref 65–99)
GLUCOSE UR STRIP-MCNC: NEGATIVE MG/DL
HBA1C MFR BLD: 6.1 %
HCT VFR BLD AUTO: 44.3 % (ref 34.8–46.1)
HGB BLD-MCNC: 14.5 G/DL (ref 11.5–15.4)
HGB UR QL STRIP.AUTO: ABNORMAL
IMM GRANULOCYTES # BLD AUTO: 0.07 THOUSAND/UL (ref 0–0.2)
IMM GRANULOCYTES NFR BLD AUTO: 1 % (ref 0–2)
INR PPP: 0.93 (ref 0.85–1.19)
KETONES UR STRIP-MCNC: NEGATIVE MG/DL
LEUKOCYTE ESTERASE UR QL STRIP: ABNORMAL
LYMPHOCYTES # BLD AUTO: 2.71 THOUSANDS/ÂΜL (ref 0.6–4.47)
LYMPHOCYTES NFR BLD AUTO: 34 % (ref 14–44)
MCH RBC QN AUTO: 29.8 PG (ref 26.8–34.3)
MCHC RBC AUTO-ENTMCNC: 32.7 G/DL (ref 31.4–37.4)
MCV RBC AUTO: 91 FL (ref 82–98)
MONOCYTES # BLD AUTO: 0.78 THOUSAND/ÂΜL (ref 0.17–1.22)
MONOCYTES NFR BLD AUTO: 10 % (ref 4–12)
NEUTROPHILS # BLD AUTO: 3.99 THOUSANDS/ÂΜL (ref 1.85–7.62)
NEUTS SEG NFR BLD AUTO: 50 % (ref 43–75)
NITRITE UR QL STRIP: NEGATIVE
NON-SQ EPI CELLS URNS QL MICRO: ABNORMAL /HPF
NRBC BLD AUTO-RTO: 0 /100 WBCS
OTHER STN SPEC: ABNORMAL
PH UR STRIP.AUTO: 6 [PH]
PLATELET # BLD AUTO: 286 THOUSANDS/UL (ref 149–390)
PMV BLD AUTO: 9.1 FL (ref 8.9–12.7)
POTASSIUM SERPL-SCNC: 3.7 MMOL/L (ref 3.5–5.3)
PROT SERPL-MCNC: 7.1 G/DL (ref 6.4–8.4)
PROT UR STRIP-MCNC: NEGATIVE MG/DL
PROTHROMBIN TIME: 12.9 SECONDS (ref 12.3–15)
RBC # BLD AUTO: 4.86 MILLION/UL (ref 3.81–5.12)
RBC #/AREA URNS AUTO: ABNORMAL /HPF
RH BLD: NEGATIVE
SODIUM SERPL-SCNC: 137 MMOL/L (ref 135–147)
SP GR UR STRIP.AUTO: 1.02 (ref 1–1.03)
SPECIMEN EXPIRATION DATE: NORMAL
UROBILINOGEN UR QL STRIP.AUTO: 0.2 E.U./DL
WBC # BLD AUTO: 7.92 THOUSAND/UL (ref 4.31–10.16)
WBC #/AREA URNS AUTO: ABNORMAL /HPF

## 2024-11-20 PROCEDURE — 36415 COLL VENOUS BLD VENIPUNCTURE: CPT

## 2024-11-20 PROCEDURE — 85610 PROTHROMBIN TIME: CPT

## 2024-11-20 PROCEDURE — 81001 URINALYSIS AUTO W/SCOPE: CPT

## 2024-11-20 PROCEDURE — 80053 COMPREHEN METABOLIC PANEL: CPT

## 2024-11-20 PROCEDURE — 86850 RBC ANTIBODY SCREEN: CPT | Performed by: NEUROLOGICAL SURGERY

## 2024-11-20 PROCEDURE — 85730 THROMBOPLASTIN TIME PARTIAL: CPT

## 2024-11-20 PROCEDURE — 83036 HEMOGLOBIN GLYCOSYLATED A1C: CPT

## 2024-11-20 PROCEDURE — 85025 COMPLETE CBC W/AUTO DIFF WBC: CPT

## 2024-11-20 PROCEDURE — 86901 BLOOD TYPING SEROLOGIC RH(D): CPT | Performed by: NEUROLOGICAL SURGERY

## 2024-11-20 PROCEDURE — 86900 BLOOD TYPING SEROLOGIC ABO: CPT | Performed by: NEUROLOGICAL SURGERY

## 2024-11-20 RX ORDER — VALSARTAN AND HYDROCHLOROTHIAZIDE 160; 12.5 MG/1; MG/1
1 TABLET, FILM COATED ORAL DAILY
Qty: 90 TABLET | Refills: 1 | Status: SHIPPED | OUTPATIENT
Start: 2024-11-20

## 2024-11-20 RX ORDER — MELOXICAM 15 MG/1
15 TABLET ORAL DAILY
Qty: 90 TABLET | Refills: 1 | Status: SHIPPED | OUTPATIENT
Start: 2024-11-20

## 2024-11-30 DIAGNOSIS — F32.A ANXIETY AND DEPRESSION: ICD-10-CM

## 2024-11-30 DIAGNOSIS — F41.9 ANXIETY AND DEPRESSION: ICD-10-CM

## 2024-12-02 PROBLEM — Z00.00 ANNUAL PHYSICAL EXAM: Status: RESOLVED | Noted: 2023-05-03 | Resolved: 2024-12-02

## 2024-12-02 PROBLEM — E66.813 CLASS 3 SEVERE OBESITY DUE TO EXCESS CALORIES WITH SERIOUS COMORBIDITY AND BODY MASS INDEX (BMI) OF 40.0 TO 44.9 IN ADULT (HCC): Status: RESOLVED | Noted: 2023-05-03 | Resolved: 2024-12-02

## 2024-12-02 PROBLEM — Z01.818 PREOP EXAMINATION: Status: ACTIVE | Noted: 2024-12-02

## 2024-12-02 PROBLEM — J06.9 ACUTE URI: Status: RESOLVED | Noted: 2024-11-11 | Resolved: 2024-12-02

## 2024-12-02 PROBLEM — U07.1 COVID: Status: RESOLVED | Noted: 2024-11-11 | Resolved: 2024-12-02

## 2024-12-02 PROBLEM — G96.01 CSF OTORRHEA: Status: ACTIVE | Noted: 2024-12-02

## 2024-12-02 PROBLEM — R73.03 PREDIABETES: Status: ACTIVE | Noted: 2024-12-02

## 2024-12-02 PROBLEM — E66.01 CLASS 3 SEVERE OBESITY DUE TO EXCESS CALORIES WITH SERIOUS COMORBIDITY AND BODY MASS INDEX (BMI) OF 40.0 TO 44.9 IN ADULT (HCC): Status: RESOLVED | Noted: 2023-05-03 | Resolved: 2024-12-02

## 2024-12-02 RX ORDER — PAROXETINE 20 MG/1
20 TABLET, FILM COATED ORAL DAILY
Qty: 90 TABLET | Refills: 1 | Status: SHIPPED | OUTPATIENT
Start: 2024-12-02

## 2024-12-02 NOTE — PROGRESS NOTES
Name: Anusha Mchugh      : 1955      MRN: [id number]  Encounter Provider: Juju Mustafa MD  Encounter Date: 12/3/2024   Encounter department: [facility]IC[facility]  :  Assessment & Plan  Class 3 severe obesity due to excess calories with serious comorbidity and body mass index (BMI) of 40.0 to 44.9 in adult (HCC)  Discussion on glp-1 side effects reviewed pt  will check with insurance         Generalized arthritis  Uses meloxicam       Mild intermittent asthma with acute exacerbation  Ok today  post treatment         Primary hypertension  Well controlled on current therapy continue with current medications and will reassess next visit           Gastroesophageal reflux disease without esophagitis  Ok on famotidine         Anxiety and depression  Ok on long term paxil         CSF otorrhea  Scheduled for repair of possible temporal encephalocele         Preop examination  EKG today nl;pt cleared for surgery     Orders:    POCT ECG           History of Present Illness     Pt is here for interval visit and evaluation of multiple medical problems, review of medications, labs, Health Maintenance and any recent specialty consults ENT neurosurgery  needs pre surgery clearance for  repair of possible left encephalocele no history of problems with anesthesia  no personal or famiy hx of DVT/PE        Review of Systems   Constitutional:  Negative for appetite change, chills, fatigue and fever.   HENT:  Negative for congestion.    Respiratory:  Negative for cough, chest tightness, shortness of breath and wheezing.    Cardiovascular:  Negative for chest pain, palpitations and leg swelling.   Gastrointestinal:  Negative for abdominal pain, constipation, diarrhea, nausea and vomiting.   Genitourinary:  Negative for difficulty urinating, frequency and urgency.   Musculoskeletal:  Negative for arthralgias, back pain, gait problem and neck pain.   Skin:  Negative for rash.   Neurological:  Negative for  "dizziness, weakness, light-headedness, numbness and headaches.   Hematological:  Does not bruise/bleed easily.   Psychiatric/Behavioral:  Negative for dysphoric mood and sleep disturbance. The patient is not nervous/anxious.           Objective   /80 (BP Location: Left arm, Patient Position: Sitting, Cuff Size: Large)   Pulse 80   Temp 98.9 °F (37.2 °C) (Tympanic)   Resp 16   Ht 5' 2\" (1.575 m)   Wt 102 kg (225 lb)   SpO2 97%   BMI 41.15 kg/m²      Physical Exam  Constitutional:       General: She is not in acute distress.     Appearance: Normal appearance. She is obese.   Eyes:      Extraocular Movements: Extraocular movements intact.      Pupils: Pupils are equal, round, and reactive to light.   Neck:      Thyroid: No thyromegaly.      Vascular: No carotid bruit.   Cardiovascular:      Rate and Rhythm: Normal rate and regular rhythm.      Heart sounds: Normal heart sounds. No murmur heard.  Pulmonary:      Effort: Pulmonary effort is normal. No respiratory distress.      Breath sounds: Normal breath sounds. No wheezing, rhonchi or rales.   Abdominal:      General: There is no distension.      Palpations: Abdomen is soft.      Tenderness: There is no abdominal tenderness.   Musculoskeletal:      Cervical back: Normal range of motion and neck supple.      Right lower leg: No edema.      Left lower leg: No edema.   Skin:     General: Skin is warm and dry.      Findings: No rash.   Neurological:      General: No focal deficit present.      Mental Status: She is alert and oriented to person, place, and time. Mental status is at baseline.      Gait: Gait normal.   Psychiatric:         Mood and Affect: Mood normal.         Behavior: Behavior normal.         "

## 2024-12-03 ENCOUNTER — OFFICE VISIT (OUTPATIENT)
Dept: FAMILY MEDICINE CLINIC | Facility: CLINIC | Age: 69
End: 2024-12-03
Payer: COMMERCIAL

## 2024-12-03 VITALS
BODY MASS INDEX: 41.41 KG/M2 | OXYGEN SATURATION: 97 % | RESPIRATION RATE: 16 BRPM | DIASTOLIC BLOOD PRESSURE: 80 MMHG | HEIGHT: 62 IN | SYSTOLIC BLOOD PRESSURE: 120 MMHG | TEMPERATURE: 98.9 F | WEIGHT: 225 LBS | HEART RATE: 80 BPM

## 2024-12-03 DIAGNOSIS — G96.01 CSF OTORRHEA: ICD-10-CM

## 2024-12-03 DIAGNOSIS — I10 PRIMARY HYPERTENSION: ICD-10-CM

## 2024-12-03 DIAGNOSIS — K21.9 GASTROESOPHAGEAL REFLUX DISEASE WITHOUT ESOPHAGITIS: ICD-10-CM

## 2024-12-03 DIAGNOSIS — E66.813 CLASS 3 SEVERE OBESITY DUE TO EXCESS CALORIES WITH SERIOUS COMORBIDITY AND BODY MASS INDEX (BMI) OF 40.0 TO 44.9 IN ADULT (HCC): Primary | ICD-10-CM

## 2024-12-03 DIAGNOSIS — Z01.818 PREOP EXAMINATION: ICD-10-CM

## 2024-12-03 DIAGNOSIS — M19.90 GENERALIZED ARTHRITIS: ICD-10-CM

## 2024-12-03 DIAGNOSIS — E66.01 CLASS 3 SEVERE OBESITY DUE TO EXCESS CALORIES WITH SERIOUS COMORBIDITY AND BODY MASS INDEX (BMI) OF 40.0 TO 44.9 IN ADULT (HCC): Primary | ICD-10-CM

## 2024-12-03 DIAGNOSIS — J45.21 MILD INTERMITTENT ASTHMA WITH ACUTE EXACERBATION: ICD-10-CM

## 2024-12-03 DIAGNOSIS — F32.A ANXIETY AND DEPRESSION: ICD-10-CM

## 2024-12-03 DIAGNOSIS — F41.9 ANXIETY AND DEPRESSION: ICD-10-CM

## 2024-12-03 PROCEDURE — 99214 OFFICE O/P EST MOD 30 MIN: CPT | Performed by: FAMILY MEDICINE

## 2024-12-03 PROCEDURE — 93000 ELECTROCARDIOGRAM COMPLETE: CPT | Performed by: FAMILY MEDICINE

## 2024-12-04 DIAGNOSIS — E66.813 CLASS 3 SEVERE OBESITY DUE TO EXCESS CALORIES WITH SERIOUS COMORBIDITY AND BODY MASS INDEX (BMI) OF 40.0 TO 44.9 IN ADULT (HCC): Primary | ICD-10-CM

## 2024-12-04 DIAGNOSIS — E66.01 CLASS 3 SEVERE OBESITY DUE TO EXCESS CALORIES WITH SERIOUS COMORBIDITY AND BODY MASS INDEX (BMI) OF 40.0 TO 44.9 IN ADULT (HCC): Primary | ICD-10-CM

## 2024-12-05 NOTE — PRE-PROCEDURE INSTRUCTIONS
Pre-Surgery Instructions:   Medication Instructions    albuterol (PROVENTIL HFA,VENTOLIN HFA) 90 mcg/act inhaler Uses PRN- OK to take day of surgery    cholecalciferol (VITAMIN D3) 1,000 units tablet Stop taking 7 days prior to surgery.    famotidine (PEPCID) 20 mg tablet Take day of surgery.    meloxicam (MOBIC) 15 mg tablet Stop taking 7 days prior to surgery.    PARoxetine (PAXIL) 20 mg tablet Take day of surgery.    valsartan-hydrochlorothiazide (DIOVAN-HCT) 160-12.5 MG per tablet Take night before surgery    Pt was instructed not to start  Wegovy which was just prescribed in last pcp visit.  Pt verbalized an understanding.Pt is not currently taking this medication.  Neuro bathing instructions starting 3 days prior to surgery. Pt instructed to bring cpap with day of surgery.     Medication instructions for day surgery reviewed. Please use only a sip of water to take your instructed medications. Avoid all over the counter vitamins, supplements and NSAIDS for one week prior to surgery per anesthesia guidelines. Tylenol is ok to take as needed.     You will receive a call one business day prior to surgery with an arrival time and hospital directions. If your surgery is scheduled on a Monday, the hospital will be calling you on the Friday prior to your surgery. If you have not heard from anyone by 8pm, please call the hospital supervisor through the hospital  at [phone]. ([place] [phone] or [place] [phone]).    Do not eat or drink anything after midnight the night before your surgery, including candy, mints, lifesavers, or chewing gum. Do not drink alcohol 24hrs before your surgery. Try not to smoke at least 24hrs before your surgery.       Follow the pre surgery showering instructions as listed in the “My Surgical Experience Booklet” or otherwise provided by your surgeon's office. Do not use a blade to shave the surgical area 1 week before surgery. It is okay to use a clean electric  clippers up to 24 hours before surgery. Do not apply any lotions, creams, including makeup, cologne, deodorant, or perfumes after showering on the day of your surgery. Do not use dry shampoo, hair spray, hair gel, or any type of hair products.     No contact lenses, eye make-up, or artificial eyelashes. Remove nail polish, including gel polish, and any artificial, gel, or acrylic nails if possible. Remove all jewelry including rings and body piercing jewelry.     Wear causal clothing that is easy to take on and off. Consider your type of surgery.    Keep any valuables, jewelry, piercings at home. Please bring any specially ordered equipment (sling, braces) if indicated.    Arrange for a responsible person to drive you to and from the hospital on the day of your surgery. Please confirm the visitor policy for the day of your procedure when you receive your phone call with an arrival time.     Call the surgeon's office with any new illnesses, exposures, or additional questions prior to surgery.    Please reference your “My Surgical Experience Booklet” for additional information to prepare for your upcoming surgery.

## 2024-12-15 ENCOUNTER — ANESTHESIA EVENT (INPATIENT)
Dept: PERIOP | Facility: HOSPITAL | Age: 69
End: 2024-12-15
Payer: COMMERCIAL

## 2024-12-16 ENCOUNTER — HOSPITAL ENCOUNTER (INPATIENT)
Facility: HOSPITAL | Age: 69
LOS: 5 days | Discharge: HOME HEALTH | DRG: 025 | End: 2024-12-21
Attending: OTOLARYNGOLOGY | Admitting: NEUROLOGICAL SURGERY
Payer: COMMERCIAL

## 2024-12-16 ENCOUNTER — APPOINTMENT (INPATIENT)
Dept: RADIOLOGY | Facility: HOSPITAL | Age: 69
DRG: 025 | End: 2024-12-16
Payer: COMMERCIAL

## 2024-12-16 ENCOUNTER — ANESTHESIA (INPATIENT)
Dept: PERIOP | Facility: HOSPITAL | Age: 69
End: 2024-12-16
Payer: COMMERCIAL

## 2024-12-16 DIAGNOSIS — G96.01 CSF OTORRHEA: Primary | ICD-10-CM

## 2024-12-16 LAB
ABO GROUP BLD: NORMAL
BASE EXCESS BLDA CALC-SCNC: -6 MMOL/L (ref -2–3)
CA-I BLD-SCNC: 1.12 MMOL/L (ref 1.12–1.32)
GLUCOSE SERPL-MCNC: 167 MG/DL (ref 65–140)
GLUCOSE SERPL-MCNC: 176 MG/DL (ref 65–140)
HCO3 BLDA-SCNC: 20.4 MMOL/L (ref 22–28)
HCT VFR BLD CALC: 36 % (ref 34.8–46.1)
HGB BLDA-MCNC: 12.2 G/DL (ref 11.5–15.4)
MAGNESIUM SERPL-MCNC: 1.5 MG/DL (ref 1.9–2.7)
PCO2 BLD: 22 MMOL/L (ref 21–32)
PCO2 BLD: 42.1 MM HG (ref 36–44)
PH BLD: 7.29 [PH] (ref 7.35–7.45)
PHOSPHATE SERPL-MCNC: 2.6 MG/DL (ref 2.3–4.1)
PO2 BLD: 143 MM HG (ref 75–129)
POTASSIUM BLD-SCNC: 3.3 MMOL/L (ref 3.5–5.3)
RH BLD: NEGATIVE
SAO2 % BLD FROM PO2: 99 % (ref 60–85)
SODIUM BLD-SCNC: 144 MMOL/L (ref 136–145)
SPECIMEN SOURCE: ABNORMAL

## 2024-12-16 PROCEDURE — 00Q10ZZ REPAIR CEREBRAL MENINGES, OPEN APPROACH: ICD-10-PCS | Performed by: NEUROLOGICAL SURGERY

## 2024-12-16 PROCEDURE — 94760 N-INVAS EAR/PLS OXIMETRY 1: CPT

## 2024-12-16 PROCEDURE — 0KX00ZZ TRANSFER HEAD MUSCLE, OPEN APPROACH: ICD-10-PCS | Performed by: OTOLARYNGOLOGY

## 2024-12-16 PROCEDURE — NC001 PR NO CHARGE: Performed by: NEUROLOGICAL SURGERY

## 2024-12-16 PROCEDURE — 62120 REPAIR SKULL CAVITY LESION: CPT | Performed by: NEUROLOGICAL SURGERY

## 2024-12-16 PROCEDURE — 84132 ASSAY OF SERUM POTASSIUM: CPT

## 2024-12-16 PROCEDURE — 84295 ASSAY OF SERUM SODIUM: CPT

## 2024-12-16 PROCEDURE — C1713 ANCHOR/SCREW BN/BN,TIS/BN: HCPCS | Performed by: OTOLARYNGOLOGY

## 2024-12-16 PROCEDURE — 0NB50ZZ EXCISION OF RIGHT TEMPORAL BONE, OPEN APPROACH: ICD-10-PCS | Performed by: NEUROLOGICAL SURGERY

## 2024-12-16 PROCEDURE — 82947 ASSAY GLUCOSE BLOOD QUANT: CPT

## 2024-12-16 PROCEDURE — 82948 REAGENT STRIP/BLOOD GLUCOSE: CPT

## 2024-12-16 PROCEDURE — 83735 ASSAY OF MAGNESIUM: CPT

## 2024-12-16 PROCEDURE — 99024 POSTOP FOLLOW-UP VISIT: CPT | Performed by: NEUROLOGICAL SURGERY

## 2024-12-16 PROCEDURE — 82330 ASSAY OF CALCIUM: CPT

## 2024-12-16 PROCEDURE — 0NH005Z INSERTION OF EXTERNAL FIXATION DEVICE INTO SKULL, OPEN APPROACH: ICD-10-PCS | Performed by: OTOLARYNGOLOGY

## 2024-12-16 PROCEDURE — 94664 DEMO&/EVAL PT USE INHALER: CPT

## 2024-12-16 PROCEDURE — 85014 HEMATOCRIT: CPT

## 2024-12-16 PROCEDURE — 84100 ASSAY OF PHOSPHORUS: CPT

## 2024-12-16 PROCEDURE — 70450 CT HEAD/BRAIN W/O DYE: CPT

## 2024-12-16 PROCEDURE — 15733 MUSC MYOQ/FSCQ FLP H&N PEDCL: CPT | Performed by: NEUROLOGICAL SURGERY

## 2024-12-16 PROCEDURE — 62272 THER SPI PNXR DRG CSF: CPT | Performed by: NEUROLOGICAL SURGERY

## 2024-12-16 PROCEDURE — 62120 REPAIR SKULL CAVITY LESION: CPT | Performed by: OTOLARYNGOLOGY

## 2024-12-16 PROCEDURE — 86923 COMPATIBILITY TEST ELECTRIC: CPT

## 2024-12-16 PROCEDURE — 0NR507Z REPLACEMENT OF RIGHT TEMPORAL BONE WITH AUTOLOGOUS TISSUE SUBSTITUTE, OPEN APPROACH: ICD-10-PCS | Performed by: OTOLARYNGOLOGY

## 2024-12-16 PROCEDURE — C1729 CATH, DRAINAGE: HCPCS | Performed by: OTOLARYNGOLOGY

## 2024-12-16 PROCEDURE — 99223 1ST HOSP IP/OBS HIGH 75: CPT | Performed by: INTERNAL MEDICINE

## 2024-12-16 PROCEDURE — 82803 BLOOD GASES ANY COMBINATION: CPT

## 2024-12-16 PROCEDURE — 009U30Z DRAINAGE OF SPINAL CANAL WITH DRAINAGE DEVICE, PERCUTANEOUS APPROACH: ICD-10-PCS | Performed by: NEUROLOGICAL SURGERY

## 2024-12-16 PROCEDURE — 00U20KZ SUPPLEMENT DURA MATER WITH NONAUTOLOGOUS TISSUE SUBSTITUTE, OPEN APPROACH: ICD-10-PCS | Performed by: NEUROLOGICAL SURGERY

## 2024-12-16 DEVICE — INTEGRA® DURAFLEX™ SUTURABLE DURAL GRAFT 2 CM X 7 CM
Type: IMPLANTABLE DEVICE | Site: BRAIN | Status: FUNCTIONAL
Brand: INTEGRA® DURAFLEX®

## 2024-12-16 DEVICE — IMPLANTABLE DEVICE
Type: IMPLANTABLE DEVICE | Site: BRAIN | Status: FUNCTIONAL
Brand: THINFLAP SYSTEM

## 2024-12-16 DEVICE — IMPLANTABLE DEVICE
Type: IMPLANTABLE DEVICE | Site: BRAIN | Status: FUNCTIONAL
Brand: THINFLAP

## 2024-12-16 RX ORDER — HYDROMORPHONE HCL IN WATER/PF 6 MG/30 ML
0.2 PATIENT CONTROLLED ANALGESIA SYRINGE INTRAVENOUS EVERY 2 HOUR PRN
Status: DISCONTINUED | OUTPATIENT
Start: 2024-12-16 | End: 2024-12-21 | Stop reason: HOSPADM

## 2024-12-16 RX ORDER — PANTOPRAZOLE SODIUM 40 MG/1
40 TABLET, DELAYED RELEASE ORAL
Status: DISCONTINUED | OUTPATIENT
Start: 2024-12-17 | End: 2024-12-17

## 2024-12-16 RX ORDER — FAMOTIDINE 20 MG/1
20 TABLET, FILM COATED ORAL 2 TIMES DAILY
Status: DISCONTINUED | OUTPATIENT
Start: 2024-12-16 | End: 2024-12-17

## 2024-12-16 RX ORDER — SODIUM CHLORIDE 9 MG/ML
INJECTION, SOLUTION INTRAVENOUS CONTINUOUS PRN
Status: DISCONTINUED | OUTPATIENT
Start: 2024-12-16 | End: 2024-12-16

## 2024-12-16 RX ORDER — DEXAMETHASONE SODIUM PHOSPHATE 4 MG/ML
4 INJECTION, SOLUTION INTRA-ARTICULAR; INTRALESIONAL; INTRAMUSCULAR; INTRAVENOUS; SOFT TISSUE EVERY 6 HOURS SCHEDULED
Status: COMPLETED | OUTPATIENT
Start: 2024-12-16 | End: 2024-12-17

## 2024-12-16 RX ORDER — ACETAMINOPHEN 325 MG/1
975 TABLET ORAL EVERY 8 HOURS SCHEDULED
Status: DISCONTINUED | OUTPATIENT
Start: 2024-12-16 | End: 2024-12-18

## 2024-12-16 RX ORDER — MAGNESIUM HYDROXIDE 1200 MG/15ML
LIQUID ORAL AS NEEDED
Status: DISCONTINUED | OUTPATIENT
Start: 2024-12-16 | End: 2024-12-16 | Stop reason: HOSPADM

## 2024-12-16 RX ORDER — DEXAMETHASONE SODIUM PHOSPHATE 10 MG/ML
INJECTION, SOLUTION INTRAMUSCULAR; INTRAVENOUS AS NEEDED
Status: DISCONTINUED | OUTPATIENT
Start: 2024-12-16 | End: 2024-12-16

## 2024-12-16 RX ORDER — ONDANSETRON 2 MG/ML
INJECTION INTRAMUSCULAR; INTRAVENOUS
Status: COMPLETED
Start: 2024-12-16 | End: 2024-12-16

## 2024-12-16 RX ORDER — LEVETIRACETAM 500 MG/5ML
500 INJECTION, SOLUTION, CONCENTRATE INTRAVENOUS EVERY 12 HOURS SCHEDULED
Status: DISCONTINUED | OUTPATIENT
Start: 2024-12-16 | End: 2024-12-21 | Stop reason: HOSPADM

## 2024-12-16 RX ORDER — CHLORHEXIDINE GLUCONATE ORAL RINSE 1.2 MG/ML
15 SOLUTION DENTAL ONCE
Status: COMPLETED | OUTPATIENT
Start: 2024-12-16 | End: 2024-12-16

## 2024-12-16 RX ORDER — GLYCOPYRROLATE 0.2 MG/ML
INJECTION INTRAMUSCULAR; INTRAVENOUS AS NEEDED
Status: DISCONTINUED | OUTPATIENT
Start: 2024-12-16 | End: 2024-12-16

## 2024-12-16 RX ORDER — LOSARTAN POTASSIUM 50 MG/1
100 TABLET ORAL DAILY
Status: DISCONTINUED | OUTPATIENT
Start: 2024-12-17 | End: 2024-12-20

## 2024-12-16 RX ORDER — PROPOFOL 10 MG/ML
INJECTION, EMULSION INTRAVENOUS CONTINUOUS PRN
Status: DISCONTINUED | OUTPATIENT
Start: 2024-12-16 | End: 2024-12-16

## 2024-12-16 RX ORDER — CALCIUM CARBONATE 500 MG/1
1000 TABLET, CHEWABLE ORAL DAILY PRN
Status: DISCONTINUED | OUTPATIENT
Start: 2024-12-16 | End: 2024-12-21 | Stop reason: HOSPADM

## 2024-12-16 RX ORDER — ONDANSETRON 2 MG/ML
4 INJECTION INTRAMUSCULAR; INTRAVENOUS EVERY 6 HOURS PRN
Status: DISCONTINUED | OUTPATIENT
Start: 2024-12-16 | End: 2024-12-17

## 2024-12-16 RX ORDER — LORATADINE 10 MG/1
10 TABLET ORAL DAILY PRN
Status: DISCONTINUED | OUTPATIENT
Start: 2024-12-16 | End: 2024-12-21 | Stop reason: HOSPADM

## 2024-12-16 RX ORDER — ALBUMIN HUMAN 50 G/1000ML
SOLUTION INTRAVENOUS CONTINUOUS PRN
Status: DISCONTINUED | OUTPATIENT
Start: 2024-12-16 | End: 2024-12-16

## 2024-12-16 RX ORDER — SODIUM CHLORIDE, SODIUM LACTATE, POTASSIUM CHLORIDE, CALCIUM CHLORIDE 600; 310; 30; 20 MG/100ML; MG/100ML; MG/100ML; MG/100ML
50 INJECTION, SOLUTION INTRAVENOUS CONTINUOUS
Status: DISCONTINUED | OUTPATIENT
Start: 2024-12-16 | End: 2024-12-20

## 2024-12-16 RX ORDER — ONDANSETRON 2 MG/ML
4 INJECTION INTRAMUSCULAR; INTRAVENOUS ONCE AS NEEDED
Status: DISCONTINUED | OUTPATIENT
Start: 2024-12-16 | End: 2024-12-16 | Stop reason: HOSPADM

## 2024-12-16 RX ORDER — VANCOMYCIN HYDROCHLORIDE 1 G/200ML
15 INJECTION, SOLUTION INTRAVENOUS ONCE
Status: COMPLETED | OUTPATIENT
Start: 2024-12-16 | End: 2024-12-16

## 2024-12-16 RX ORDER — LIDOCAINE HYDROCHLORIDE AND EPINEPHRINE 10; 10 MG/ML; UG/ML
INJECTION, SOLUTION INFILTRATION; PERINEURAL AS NEEDED
Status: DISCONTINUED | OUTPATIENT
Start: 2024-12-16 | End: 2024-12-16 | Stop reason: HOSPADM

## 2024-12-16 RX ORDER — LEVETIRACETAM 750 MG/1
750 TABLET ORAL EVERY 12 HOURS SCHEDULED
Status: CANCELLED | OUTPATIENT
Start: 2024-12-16 | End: 2024-12-19

## 2024-12-16 RX ORDER — FENTANYL CITRATE 50 UG/ML
INJECTION, SOLUTION INTRAMUSCULAR; INTRAVENOUS AS NEEDED
Status: DISCONTINUED | OUTPATIENT
Start: 2024-12-16 | End: 2024-12-16

## 2024-12-16 RX ORDER — SUCCINYLCHOLINE/SOD CL,ISO/PF 100 MG/5ML
SYRINGE (ML) INTRAVENOUS AS NEEDED
Status: DISCONTINUED | OUTPATIENT
Start: 2024-12-16 | End: 2024-12-16

## 2024-12-16 RX ORDER — ROCURONIUM BROMIDE 10 MG/ML
INJECTION, SOLUTION INTRAVENOUS AS NEEDED
Status: DISCONTINUED | OUTPATIENT
Start: 2024-12-16 | End: 2024-12-16

## 2024-12-16 RX ORDER — FENTANYL CITRATE/PF 50 MCG/ML
25 SYRINGE (ML) INJECTION
Refills: 0 | Status: DISCONTINUED | OUTPATIENT
Start: 2024-12-16 | End: 2024-12-16 | Stop reason: HOSPADM

## 2024-12-16 RX ORDER — SODIUM CHLORIDE 9 MG/ML
125 INJECTION, SOLUTION INTRAVENOUS CONTINUOUS
Status: DISCONTINUED | OUTPATIENT
Start: 2024-12-16 | End: 2024-12-16

## 2024-12-16 RX ORDER — SODIUM CHLORIDE 9 MG/ML
20 INJECTION, SOLUTION INTRAVENOUS CONTINUOUS
Status: DISCONTINUED | OUTPATIENT
Start: 2024-12-16 | End: 2024-12-16

## 2024-12-16 RX ORDER — ONDANSETRON 2 MG/ML
INJECTION INTRAMUSCULAR; INTRAVENOUS AS NEEDED
Status: DISCONTINUED | OUTPATIENT
Start: 2024-12-16 | End: 2024-12-16

## 2024-12-16 RX ORDER — OXYCODONE HYDROCHLORIDE 5 MG/1
5 TABLET ORAL EVERY 4 HOURS PRN
Status: DISCONTINUED | OUTPATIENT
Start: 2024-12-16 | End: 2024-12-21 | Stop reason: HOSPADM

## 2024-12-16 RX ORDER — ALBUTEROL SULFATE 90 UG/1
2 INHALANT RESPIRATORY (INHALATION) EVERY 6 HOURS PRN
Status: DISCONTINUED | OUTPATIENT
Start: 2024-12-16 | End: 2024-12-21 | Stop reason: HOSPADM

## 2024-12-16 RX ORDER — LIDOCAINE HYDROCHLORIDE 10 MG/ML
INJECTION, SOLUTION EPIDURAL; INFILTRATION; INTRACAUDAL; PERINEURAL AS NEEDED
Status: DISCONTINUED | OUTPATIENT
Start: 2024-12-16 | End: 2024-12-16

## 2024-12-16 RX ORDER — PAROXETINE 20 MG/1
20 TABLET, FILM COATED ORAL DAILY
Status: DISCONTINUED | OUTPATIENT
Start: 2024-12-17 | End: 2024-12-21 | Stop reason: HOSPADM

## 2024-12-16 RX ORDER — EPHEDRINE SULFATE 50 MG/ML
INJECTION INTRAVENOUS AS NEEDED
Status: DISCONTINUED | OUTPATIENT
Start: 2024-12-16 | End: 2024-12-16

## 2024-12-16 RX ORDER — SODIUM CHLORIDE, SODIUM LACTATE, POTASSIUM CHLORIDE, CALCIUM CHLORIDE 600; 310; 30; 20 MG/100ML; MG/100ML; MG/100ML; MG/100ML
INJECTION, SOLUTION INTRAVENOUS CONTINUOUS PRN
Status: DISCONTINUED | OUTPATIENT
Start: 2024-12-16 | End: 2024-12-16

## 2024-12-16 RX ORDER — PROPOFOL 10 MG/ML
INJECTION, EMULSION INTRAVENOUS AS NEEDED
Status: DISCONTINUED | OUTPATIENT
Start: 2024-12-16 | End: 2024-12-16

## 2024-12-16 RX ORDER — CEFAZOLIN SODIUM 1 G/50ML
1000 SOLUTION INTRAVENOUS EVERY 8 HOURS
Status: DISCONTINUED | OUTPATIENT
Start: 2024-12-16 | End: 2024-12-20

## 2024-12-16 RX ORDER — HYDROCHLOROTHIAZIDE 25 MG/1
25 TABLET ORAL DAILY
Status: DISCONTINUED | OUTPATIENT
Start: 2024-12-17 | End: 2024-12-17

## 2024-12-16 RX ORDER — POLYETHYLENE GLYCOL 3350 17 G/17G
17 POWDER, FOR SOLUTION ORAL DAILY
Status: DISCONTINUED | OUTPATIENT
Start: 2024-12-17 | End: 2024-12-21 | Stop reason: HOSPADM

## 2024-12-16 RX ORDER — AMOXICILLIN 250 MG
1 CAPSULE ORAL
Status: DISCONTINUED | OUTPATIENT
Start: 2024-12-16 | End: 2024-12-21 | Stop reason: HOSPADM

## 2024-12-16 RX ORDER — HYDROMORPHONE HCL/PF 1 MG/ML
0.5 SYRINGE (ML) INJECTION
Status: DISCONTINUED | OUTPATIENT
Start: 2024-12-16 | End: 2024-12-16 | Stop reason: HOSPADM

## 2024-12-16 RX ORDER — LEVETIRACETAM 500 MG/5ML
INJECTION, SOLUTION, CONCENTRATE INTRAVENOUS AS NEEDED
Status: DISCONTINUED | OUTPATIENT
Start: 2024-12-16 | End: 2024-12-16

## 2024-12-16 RX ADMIN — DEXMEDETOMIDINE HYDROCHLORIDE 4 MCG: 100 INJECTION, SOLUTION INTRAVENOUS at 11:10

## 2024-12-16 RX ADMIN — ROCURONIUM BROMIDE 50 MG: 10 INJECTION, SOLUTION INTRAVENOUS at 09:23

## 2024-12-16 RX ADMIN — LEVETIRACETAM 1000 MG: 100 INJECTION, SOLUTION INTRAVENOUS at 10:41

## 2024-12-16 RX ADMIN — ALBUMIN (HUMAN): 12.5 INJECTION, SOLUTION INTRAVENOUS at 09:50

## 2024-12-16 RX ADMIN — CHLORHEXIDINE GLUCONATE 0.12% ORAL RINSE 15 ML: 1.2 LIQUID ORAL at 07:54

## 2024-12-16 RX ADMIN — REMIFENTANIL HYDROCHLORIDE 0.3 MCG/KG/MIN: 1 INJECTION, POWDER, LYOPHILIZED, FOR SOLUTION INTRAVENOUS at 09:53

## 2024-12-16 RX ADMIN — FENTANYL CITRATE 25 MCG: 50 INJECTION INTRAMUSCULAR; INTRAVENOUS at 15:15

## 2024-12-16 RX ADMIN — NOREPINEPHRINE BITARTRATE 4 MCG/MIN: 1 INJECTION, SOLUTION, CONCENTRATE INTRAVENOUS at 11:02

## 2024-12-16 RX ADMIN — Medication 200 MCG: at 14:02

## 2024-12-16 RX ADMIN — VANCOMYCIN HYDROCHLORIDE 1000 MG: 1 INJECTION, SOLUTION INTRAVENOUS at 09:56

## 2024-12-16 RX ADMIN — EPHEDRINE SULFATE 10 MG: 50 INJECTION, SOLUTION INTRAVENOUS at 09:58

## 2024-12-16 RX ADMIN — PHENYLEPHRINE HYDROCHLORIDE 60 MCG/MIN: 10 INJECTION INTRAVENOUS at 09:56

## 2024-12-16 RX ADMIN — DEXAMETHASONE SODIUM PHOSPHATE 4 MG: 4 INJECTION INTRA-ARTICULAR; INTRALESIONAL; INTRAMUSCULAR; INTRAVENOUS; SOFT TISSUE at 17:00

## 2024-12-16 RX ADMIN — CEFAZOLIN SODIUM 1000 MG: 1 SOLUTION INTRAVENOUS at 17:55

## 2024-12-16 RX ADMIN — PROPOFOL 100 MG: 10 INJECTION, EMULSION INTRAVENOUS at 09:18

## 2024-12-16 RX ADMIN — ALBUMIN (HUMAN): 12.5 INJECTION, SOLUTION INTRAVENOUS at 12:54

## 2024-12-16 RX ADMIN — Medication 200 MCG: at 14:04

## 2024-12-16 RX ADMIN — HYDROMORPHONE HYDROCHLORIDE 0.2 MG: 0.2 INJECTION, SOLUTION INTRAMUSCULAR; INTRAVENOUS; SUBCUTANEOUS at 20:43

## 2024-12-16 RX ADMIN — VANCOMYCIN HYDROCHLORIDE 1000 MG: 1 INJECTION, SOLUTION INTRAVENOUS at 08:56

## 2024-12-16 RX ADMIN — GLYCOPYRROLATE 0.1 MG: 0.2 INJECTION, SOLUTION INTRAMUSCULAR; INTRAVENOUS at 09:15

## 2024-12-16 RX ADMIN — PROPOFOL 100 MCG/KG/MIN: 10 INJECTION, EMULSION INTRAVENOUS at 10:31

## 2024-12-16 RX ADMIN — PROPOFOL 100 MG: 10 INJECTION, EMULSION INTRAVENOUS at 09:21

## 2024-12-16 RX ADMIN — NOREPINEPHRINE BITARTRATE 8 MCG: 1 INJECTION, SOLUTION, CONCENTRATE INTRAVENOUS at 11:51

## 2024-12-16 RX ADMIN — NOREPINEPHRINE BITARTRATE 7 MCG/MIN: 1 INJECTION, SOLUTION, CONCENTRATE INTRAVENOUS at 13:17

## 2024-12-16 RX ADMIN — SODIUM CHLORIDE, SODIUM LACTATE, POTASSIUM CHLORIDE, AND CALCIUM CHLORIDE 125 ML/HR: .6; .31; .03; .02 INJECTION, SOLUTION INTRAVENOUS at 16:51

## 2024-12-16 RX ADMIN — NOREPINEPHRINE BITARTRATE 8 MCG: 1 INJECTION, SOLUTION, CONCENTRATE INTRAVENOUS at 11:00

## 2024-12-16 RX ADMIN — ONDANSETRON 4 MG: 2 INJECTION INTRAMUSCULAR; INTRAVENOUS at 16:21

## 2024-12-16 RX ADMIN — LIDOCAINE HYDROCHLORIDE 50 MG: 10 INJECTION, SOLUTION EPIDURAL; INFILTRATION; INTRACAUDAL; PERINEURAL at 09:16

## 2024-12-16 RX ADMIN — LEVETIRACETAM 500 MG: 100 INJECTION, SOLUTION INTRAVENOUS at 21:15

## 2024-12-16 RX ADMIN — DEXMEDETOMIDINE HYDROCHLORIDE 8 MCG: 100 INJECTION, SOLUTION INTRAVENOUS at 11:00

## 2024-12-16 RX ADMIN — ONDANSETRON 4 MG: 2 INJECTION INTRAMUSCULAR; INTRAVENOUS at 22:28

## 2024-12-16 RX ADMIN — Medication 100 MG: at 09:16

## 2024-12-16 RX ADMIN — SUGAMMADEX 200 MG: 100 INJECTION, SOLUTION INTRAVENOUS at 13:59

## 2024-12-16 RX ADMIN — NOREPINEPHRINE BITARTRATE 8 MCG: 1 INJECTION, SOLUTION, CONCENTRATE INTRAVENOUS at 13:18

## 2024-12-16 RX ADMIN — NOREPINEPHRINE BITARTRATE 8 MCG: 1 INJECTION, SOLUTION, CONCENTRATE INTRAVENOUS at 12:44

## 2024-12-16 RX ADMIN — ACETAMINOPHEN 975 MG: 325 TABLET, FILM COATED ORAL at 16:59

## 2024-12-16 RX ADMIN — DEXAMETHASONE SODIUM PHOSPHATE 10 MG: 10 INJECTION, SOLUTION INTRAMUSCULAR; INTRAVENOUS at 09:13

## 2024-12-16 RX ADMIN — SODIUM CHLORIDE: 0.9 INJECTION, SOLUTION INTRAVENOUS at 09:20

## 2024-12-16 RX ADMIN — PROPOFOL 200 MG: 10 INJECTION, EMULSION INTRAVENOUS at 09:16

## 2024-12-16 RX ADMIN — SODIUM CHLORIDE: 9 INJECTION, SOLUTION INTRAVENOUS at 09:12

## 2024-12-16 RX ADMIN — PROPOFOL 100 MG: 10 INJECTION, EMULSION INTRAVENOUS at 09:26

## 2024-12-16 RX ADMIN — FENTANYL CITRATE 100 MCG: 50 INJECTION INTRAMUSCULAR; INTRAVENOUS at 09:16

## 2024-12-16 RX ADMIN — ONDANSETRON 4 MG: 2 INJECTION INTRAMUSCULAR; INTRAVENOUS at 09:13

## 2024-12-16 RX ADMIN — NOREPINEPHRINE BITARTRATE 8 MCG: 1 INJECTION, SOLUTION, CONCENTRATE INTRAVENOUS at 12:05

## 2024-12-16 RX ADMIN — SODIUM CHLORIDE 20 ML/HR: 0.9 INJECTION, SOLUTION INTRAVENOUS at 08:18

## 2024-12-16 RX ADMIN — FENTANYL CITRATE 25 MCG: 50 INJECTION INTRAMUSCULAR; INTRAVENOUS at 14:34

## 2024-12-16 NOTE — ANESTHESIA PREPROCEDURE EVALUATION
Procedure:  CRANIOTOMY MIDDLE FOSSA,RETROMASTOID APPROACH FOR ENT (Right: Head)  Right endoscopic middle fossa craniotomy for repair of tegmen defect and CSF leak with temporalis fascia or muscle flap, with neuromonitoring (CN 7/8) and intraoperative lumbar drain placement (Right: Head)    Relevant Problems   CARDIO   (+) Primary hypertension      GI/HEPATIC   (+) Gastroesophageal reflux disease without esophagitis      MUSCULOSKELETAL   (+) Generalized arthritis      NEURO/PSYCH   (+) Anxiety and depression      PULMONARY   (+) Mild intermittent asthma with acute exacerbation   (+) LOUISE (obstructive sleep apnea)   (+) Sleep apnea treated with continuous positive airway pressure (CPAP)        Physical Exam    Airway    Mallampati score: II  TM Distance: >3 FB  Neck ROM: full     Dental   No notable dental hx     Cardiovascular  Cardiovascular exam normal    Pulmonary  Pulmonary exam normal     Other Findings  post-pubertal.      Anesthesia Plan  ASA Score- 3     Anesthesia Type- general with ASA Monitors.         Additional Monitors: arterial line.    Airway Plan: ETT.           Plan Factors-Exercise tolerance (METS): >4 METS.    Chart reviewed. EKG reviewed.  Existing labs reviewed. Patient summary reviewed.    Patient is not a current smoker.              Induction- intravenous.    Postoperative Plan- Plan for postoperative opioid use.     Perioperative Resuscitation Plan - Level 1 - Full Code.       Informed Consent- Anesthetic plan and risks discussed with patient and daughter.  I personally reviewed this patient with the CRNA. Discussed and agreed on the Anesthesia Plan with the CRNA..

## 2024-12-16 NOTE — H&P
History & Physical Exam  Anusha Mchugh 69 y.o. 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 No changes per patient since my last evaluation in clinic.    HPI  Please refer to my office note for full HPI leading up to this surgery.    History:  Past Medical History:   Diagnosis Date    Anxiety     Arthritis     CPAP (continuous positive airway pressure) dependence     Depression     Ear problems     GERD (gastroesophageal reflux disease)     HL (hearing loss) Nov 2023    Started as Sporadic. Seems worse now    Hypertension     Nasal congestion     Very frequent    Obesity N/A    PONV (postoperative nausea and vomiting)     Sleep apnea Sept 2009     Past Surgical History:   Procedure Laterality Date    BREAST SURGERY      cyst removal on bilateral breast    ECTOPIC PREGNANCY SURGERY      EYE SURGERY      cataract    KNEE SURGERY         Current medications:  No current facility-administered medications for this encounter.    Allergies:  Allergies   Allergen Reactions    Amoxicillin-Pot Clavulanate Diarrhea       Review of systems:  General ROS: negative for chills, fatigue, fever, night sweats, or unintentional weight changes  Respiratory ROS: no cough, shortness of breath, or wheezing  Cardiovascular ROS: no chest pain or dyspnea on exertion  Gastrointestinal ROS: no abdominal pain, change in bowel habits, or black or bloody stools  Genito-Urinary ROS: no dysuria, trouble voiding, or hematuria  Musculoskeletal ROS: negative  Neurological ROS: negative except for symptoms outlined in HPI and Discussion     Physical  exam:  Neurologic:  AOX3  PERRL, EOMI  FS  Follows commands briskly throughout  5/5 in all extremities, no drift  Sensation intact throughout  Baseline neurologic deficits as per recent clinic note    CV: regular sinus rhythm without murmur or abnormalities  Respiratory: normal breath sounds  Abdominal: soft, non-tender, normal bowel sounds  Extremities: normal coloration, no edema or visible/gross abnormalities     Lab Results: All relevant preoperative labs reviewed  Imaging: All relevant preoperative imaging reviewed  EKG, Pathology, and Other Studies: All relevant preoperative studies reviewed    We will proceed with surgery as planned.    Oscar Borjas MD

## 2024-12-16 NOTE — H&P
Otolaryngology Pre-op note/History and Physical    Date of service: 12/16/20248:27 AM      Anusha Mchugh is a 69 y.o. 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 Patient to OR for endoscopic right middle fossa craniotomy for repair of tegmen defect and CSF leak with temporalis fascia or muscle flap, with neuromonitoring (CN 7/8) and intraoperative lumbar drain placement .      Pmh: Reviewed  Pshx: Reviewed  Social history: Reviewed  Medications: Reviewed  ROS: as above      Vitals:    12/16/24 0739   BP: 144/88   Pulse: 88   Resp: 20   Temp: (!) 96.8 °F (36 °C)   SpO2: 96%       Pulmonary/Chest: Normal effort and rate. No respiratory distress. Heart: S1 S2 RRR. Lungs CTAB  Musculoskeletal: Normal range of motion. Extremities are normal on exam   Neurological: Cranial nerves 2-12 intact.  Abdomen: Soft, non tender  Skin: Skin is warm and dry.   Psychiatric: Normal mood and affect.    The procedure was discussed with the patient, including risks, benefits, and alternatives and all questions were answered. Consent signed and in the chart.    Deja Whyte MD

## 2024-12-16 NOTE — CONSULTS
Consultation - Critical Care/ICU   Name: Anusha Mchugh 69 y.o. female I MRN: [id number]  Unit/Bed#: ICU 07 I Date of Admission: 12/16/2024   Date of Service: 12/16/2024 I Hospital Day: 0   Consults  Physician Requesting Evaluation: Briseyda Echols MD   Reason for Evaluation / Principal Problem: Pain management, mobilization, BP monitoring s/p craniotomy for tegman defect repair    I have discussed the above management plan in detail with the primary service.     Assessment & Plan   Neuro:   Diagnosis: Postop day 0 status post lumbar drain placement, right endoscopic middle fossa craniectomy for repair CSF leak and tegmen defect  Recovering well, neurologically stable  Denies headache, focal symptoms  CT head in PACU showing moderate pneumocephalus  Plan:  SBP less than 160  Pain control-Tylenol, oxycodone, hydromorphone  Decadron 48 hours for expected temporal lobe edema  Keppra 1 week for seizure prophylaxis  No additional imaging required unless clinically indicated  Resume LD CSF drainage at 1800 on 12/16 at 10ml/hr.    Intra-op drain 50cc  Tentative clamp of LD drain 12/18 evening, with potential removal Thursday.  Ancef, vancomycin  Diagnosis: History of depression  Home paroxetine 20 mg p.o.    CV:   Diagnosis: HTN  Plan: HCTZ 25mg PO, losartan 100mg PO     Pulm:  Diagnosis: Mild intermittent asthma  Home albuterol 2 puffs every 6 as needed  Loratadine 10 mg  Diagnosis: LOUISE  Plan: CPAP    GI:   Postoperative nausea  Zofran  Diagnosis: GERD  Plan: Protonix 40 mg, Pepcid 20 mg, Tums    :  DC [facility]    F/E/N:   Fluids: LR 125ml/h  Electrolytes: Replete as needed  Nutrition: Regular diet, bowel regimen in place-senna docusate    Heme/Onc:   No active issues    Endo:   No active issues    ID:   No active issues    MSK/Skin:   No active issues  Disposition: Critical care    History of Present Illness   Anusha Mchugh is a 69 y.o. who presents status post craniotomy to repair tegmen defect with CSF leak  with lumbar drain in place.  ICU consulted for pain management, mobilization, pressure goals while lumbar drain in place.  Patient currently complaining of nausea, photophobia, phonophobia, slight headache    History obtained from chart review and the patient.  Review of Systems: See HPI for Review of Systems    Historical Information   Past Medical History:  No date: Anxiety  No date: Arthritis  No date: CPAP (continuous positive airway pressure) dependence  No date: Depression  No date: Ear problems  No date: GERD (gastroesophageal reflux disease)  Nov 2023: HL (hearing loss)      Comment:  Started as Sporadic. Seems worse now  No date: Hypertension  No date: Nasal congestion      Comment:  Very frequent  N/A: Obesity  No date: PONV (postoperative nausea and vomiting)  Sept 2009: Sleep apnea Past Surgical History:  No date: BREAST SURGERY      Comment:  cyst removal on bilateral breast  No date: ECTOPIC PREGNANCY SURGERY  No date: EYE SURGERY      Comment:  cataract  No date: KNEE SURGERY   Current Outpatient Medications   Medication Instructions    albuterol (PROVENTIL HFA,VENTOLIN HFA) 90 mcg/act inhaler 2 puffs, Inhalation, Every 6 hours PRN    Cetirizine HCl (ZYRTEC PO) Take by mouth    cholecalciferol (VITAMIN D3) 2,000 Units, Daily    famotidine (PEPCID) 20 mg, Oral, 2 times daily    meloxicam (MOBIC) 15 mg, Oral, Daily    PARoxetine (PAXIL) 20 mg, Oral, Daily    Semaglutide-Weight Management (WEGOVY) 0.25 mg, Subcutaneous, Weekly    triamcinolone (KENALOG) 0.1 % ointment Apply to affected area twice daily for 14 days as needed. May repeat course after one week break. DO NOT apply to face, groin, axillae.    valsartan-hydrochlorothiazide (DIOVAN-HCT) 160-12.5 MG per tablet 1 tablet, Oral, Daily    Allergies   Allergen Reactions    Amoxicillin-Pot Clavulanate Diarrhea      Social History     Tobacco Use    Smoking status: Never    Smokeless tobacco: Never   Vaping Use    Vaping status: Never Used   Substance  Use Topics    Alcohol use: Not Currently     Comment: Rarely drink alcohol    Drug use: Yes     Types: Marijuana    Family History   Problem Relation Age of Onset    Cancer Mother     Stroke Father     Diabetes Father         Type 2          Objective :                   Vitals I/O      Most Recent Min/Max in 24hrs   Temp (!) 97.4 °F (36.3 °C) Temp  Min: 96.8 °F (36 °C)  Max: 98.6 °F (37 °C)   Pulse 88 Pulse  Min: 88  Max: 118   Resp 17 Resp  Min: 17  Max: 24   /68 BP  Min: 124/68  Max: 144/88   O2 Sat 99 % SpO2  Min: 94 %  Max: 99 %      Intake/Output Summary (Last 24 hours) at 12/16/2024 1655  Last data filed at 12/16/2024 1624  Gross per 24 hour   Intake 2850 ml   Output 2160 ml   Net 690 ml       Diet Regular; Regular House    Invasive Monitoring           Physical Exam   Physical Exam  Vitals and nursing note reviewed.   Eyes:      General: No visual field deficit.     Extraocular Movements: Extraocular movements intact.      Right eye: Normal extraocular motion and no nystagmus.      Left eye: Normal extraocular motion and no nystagmus.      Pupils: Pupils are equal.   Skin:     General: Skin is warm and dry.   HENT:      Head: Normocephalic and atraumatic. No right periorbital erythema or left periorbital erythema.      Mouth/Throat:      Mouth: Mucous membranes are moist.   Cardiovascular:      Rate and Rhythm: Normal rate and regular rhythm.      Pulses: No decreased pulses.           Carotid pulses are 2+ on the right side and 2+ on the left side.       Posterior tibial pulses are 2+ on the right side and 2+ on the left side.      Heart sounds: Normal heart sounds.   Musculoskeletal:      Right lower leg: No edema.      Left lower leg: No edema.   Abdominal:      Palpations: Abdomen is soft.      Tenderness: There is no abdominal tenderness.   Constitutional:       General: She is not in acute distress.     Appearance: She is not ill-appearing.      Interventions: She is not sedated and not  intubated.  Pulmonary:      Effort: No tachypnea, bradypnea, accessory muscle usage, respiratory distress, retractions or accessory muscle usage. She is not intubated.      Breath sounds: No stridor or decreased air movement. No decreased breath sounds, wheezing, rhonchi or rales.   Neurological:      Mental Status: She is alert and oriented to person, place, and time.      GCS: GCS eye subscore is 4. GCS verbal subscore is 5. GCS motor subscore is 6.      Sensory: No sensory deficit.      Motor: No weakness.      Comments: R hearing loss, otherwise CN's intact bilaterally           Diagnostic Studies        Lab Results: I have reviewed the following results:     Medications:  Scheduled PRN   acetaminophen, 975 mg, Q8H TD  cefazolin, 1,000 mg, Q8H  dexamethasone, 4 mg, Q6H TD  famotidine, 20 mg, BID  [START ON 12/17/2024] losartan, 100 mg, Daily   And  [START ON 12/17/2024] hydroCHLOROthiazide, 25 mg, Daily  levETIRAcetam, 500 mg, Q12H TD  [START ON 12/17/2024] pantoprazole, 40 mg, Early Morning  [START ON 12/17/2024] PARoxetine, 20 mg, Daily  [START ON 12/17/2024] polyethylene glycol, 17 g, Daily  senna-docusate sodium, 1 tablet, HS  vancomycin (VANCOCIN) 1,000 mg in sodium chloride 0.9 % 1,000 mL irrigation, 1,000 mg, Once      albuterol, 2 puff, Q6H PRN  calcium carbonate, 1,000 mg, Daily PRN  HYDROmorphone, 0.2 mg, Q2H PRN  loratadine, 10 mg, Daily PRN  ondansetron, 4 mg, Q6H PRN  oxyCODONE, 2.5 mg, Q4H PRN   Or  oxyCODONE, 5 mg, Q4H PRN       Continuous    lactated ringers, 125 mL/hr, Last Rate: 125 mL/hr (12/16/24 1651)         Labs:   CBC    No recent results  BMP    No recent results    Coags    No recent results     Additional Electrolytes  No recent results       Blood Gas    No recent results  No recent results LFTs  No recent results    Infectious  No recent results  Glucose  No recent results

## 2024-12-16 NOTE — PROGRESS NOTES
Progress Note - Neurosurgery   Name: Anusha Mchugh 69 y.o. female I MRN: [id number]  Unit/Bed#: OR POOL I Date of Admission: 12/16/2024   Date of Service: 12/16/2024 I Hospital Day: 0   Postop check  Patient is now s/p lumbar drain placement and right endoscopic middle fossa craniectomy for repair of CSF leak and tegmen defect, recovering well and neurologically stable.     Vitals:    12/16/24 1415 12/16/24 1430 12/16/24 1445 12/16/24 1515   BP: 134/71 126/63 133/67    Pulse: (!) 117 97 97    Resp: 21 20 21    Temp:   97.7 °F (36.5 °C)    TempSrc:       SpO2: 96% 94% 95% 95%   Weight:       Height:           Neurologic exam  Alert, waking up from anesthesia   PERRL, EOMI  Face symmetric  5/5 in all extremities, following commands   Sensation intact throughout  Dressing CDI  LD remains functional    Assessment  Patient is a 69-year-old female with h/o obesity (BMI 40), anxiety/depression, HTN, LOUISE who p/w right hearing loss and CSF otorrhea, now s/p lumbar drain placement and right endoscopic middle fossa craniectomy for repair of CSF leak and tegmen defect.    Plan  -Neurologically stable, denying headache and new leg symptoms postoperatively  -CTH in PACU shows moderate pneumocephalus, resume LD CSF drainage around 6PM (drained ~50 cc total intra-op)  -SBP<160  -Decadron 48 hours for expected temporal lobe edema, with PPI  -Keppra X 1 week for seizure ppx   -No further imaging unless clinically indicated   -Advance diet as tolerated   -D/C [facility] ASAP  -PT/OT with LD clamped  -Tentatively clamp LD Wednesday night and remove Thursday if no recurrent CSF leak  -ICU care while LD in place   -Hold all forms of AC/AP for now     Daughter updated in waiting room      Oscar Borjas M.D.  Neurosurgeon

## 2024-12-16 NOTE — PLAN OF CARE
Problem: PAIN - ADULT  Goal: Verbalizes/displays adequate comfort level or baseline comfort level  Description: Interventions:  - Encourage patient to monitor pain and request assistance  - Assess pain using appropriate pain scale  - Administer analgesics based on type and severity of pain and evaluate response  - Implement non-pharmacological measures as appropriate and evaluate response  - Consider cultural and social influences on pain and pain management  - Notify physician/advanced practitioner if interventions unsuccessful or patient reports new pain  Outcome: Progressing     Problem: INFECTION - ADULT  Goal: Absence or prevention of progression during hospitalization  Description: INTERVENTIONS:  - Assess and monitor for signs and symptoms of infection  - Monitor lab/diagnostic results  - Monitor all insertion sites, i.e. indwelling lines, tubes, and drains  - Monitor endotracheal if appropriate and nasal secretions for changes in amount and color  - [place] appropriate cooling/warming therapies per order  - Administer medications as ordered  - Instruct and encourage patient and family to use good hand hygiene technique  - Identify and instruct in appropriate isolation precautions for identified infection/condition  Outcome: Progressing  Goal: Absence of fever/infection during neutropenic period  Description: INTERVENTIONS:  - Monitor WBC    Outcome: Progressing     Problem: SAFETY ADULT  Goal: Patient will remain free of falls  Description: INTERVENTIONS:  - Educate patient/family on patient safety including physical limitations  - Instruct patient to call for assistance with activity   - Consult OT/PT to assist with strengthening/mobility   - Keep Call bell within reach  - Keep bed low and locked with side rails adjusted as appropriate  - Keep care items and personal belongings within reach  - Initiate and maintain comfort rounds  - Make Fall Risk Sign visible to staff  - Offer Toileting every 2 Hours,  in advance of need  - Initiate/Maintain  all alarm  - Obtain necessary fall risk management equipment:  bed alarm  - Apply yellow socks and bracelet for high fall risk patients  - Consider moving patient to room near nurses station  Outcome: Progressing  Goal: Maintain or return to baseline ADL function  Description: INTERVENTIONS:  -  Assess patient's ability to carry out ADLs; assess patient's baseline for ADL function and identify physical deficits which impact ability to perform ADLs (bathing, care of mouth/teeth, toileting, grooming, dressing, etc.)  - Assess/evaluate cause of self-care deficits   - Assess range of motion  - Assess patient's mobility; develop plan if impaired  - Assess patient's need for assistive devices and provide as appropriate  - Encourage maximum independence but intervene and supervise when necessary  - Involve family in performance of ADLs  - Assess for home care needs following discharge   - Consider OT consult to assist with ADL evaluation and planning for discharge  - Provide patient education as appropriate  Outcome: Progressing  Goal: Maintains/Returns to pre admission functional level  Description: INTERVENTIONS:  - Perform AM-PAC 6 Click Basic Mobility/ Daily Activity assessment daily.  - Set and communicate daily mobility goal to care team and patient/family/caregiver.   - Collaborate with rehabilitation services on mobility goals if consulted  - Perform Range of Motion 3 times a day.  - Reposition patient every 2 hours.  - Dangle patient 3 times a day  - Stand patient 2 times a day  - Ambulate patient 1 times a day  - Out of bed to chair 2 times a day   - Out of bed for meals 2 times a day  - Out of bed for toileting  - Record patient progress and toleration of activity level   Outcome: Progressing     Problem: DISCHARGE PLANNING  Goal: Discharge to home or other facility with appropriate resources  Description: INTERVENTIONS:  - Identify barriers to discharge w/patient and  caregiver  - Arrange for needed discharge resources and transportation as appropriate  - Identify discharge learning needs (meds, wound care, etc.)  - Arrange for interpretive services to assist at discharge as needed  - Refer to Case Management Department for coordinating discharge planning if the patient needs post-hospital services based on physician/advanced practitioner order or complex needs related to functional status, cognitive ability, or social support system  Outcome: Progressing     Problem: Knowledge Deficit  Goal: Patient/family/caregiver demonstrates understanding of disease process, treatment plan, medications, and discharge instructions  Description: Complete learning assessment and assess knowledge base.  Interventions:  - Provide teaching at level of understanding  - Provide teaching via preferred learning methods  Outcome: Progressing

## 2024-12-16 NOTE — ANESTHESIA POSTPROCEDURE EVALUATION
Post-Op Assessment Note    CV Status:  Stable  Pain Score: 0    Pain management: adequate       Mental Status:  Alert and awake   Hydration Status:  Euvolemic and stable   PONV Controlled:  Controlled   Airway Patency:  Patent and adequate     Post Op Vitals Reviewed: Yes    No anethesia notable event occurred.    Staff: CRNA           Last Filed PACU Vitals:  Vitals Value Taken Time   Temp 98.6 °F (37 °C) 12/16/24 1414   Pulse 114 12/16/24 1416   /71 12/16/24 1415   Resp 20 12/16/24 1416   SpO2 96 % 12/16/24 1416   Vitals shown include unfiled device data.    Modified Zaida:  No data recorded

## 2024-12-16 NOTE — OP NOTE
OPERATIVE REPORT  PATIENT NAME: Anusha Mchugh    :  1955  MRN: [id number]  Pt Location: BE OR ROOM 18    SURGERY DATE: 2024    Surgeons and Role:  ENT:     * Briseyda Echols MD - Primary     * Deja Whyte MD - Assisting  Neurosurgery:     * Oscar Borjas MD - Primary    Preop Diagnosis:  CSF otorrhea [G96.01]  Encephalocele     Post-Op Diagnosis Codes:  CSF otorrhea [G96.01]  Encephalocele     Procedure(s):  1. Placement of lumbar drain at L3/4  2. Right endoscopic middle fossa craniectomy for repair of CSF leak and tegmen defect with Duraflex onlay and Duraseal using neuromonitoring      Specimen(s):  None     Estimated Blood Loss:   50 cc     Drains:  Lumbar drain     Anesthesia Type:   General     Operative Indications:  CSF otorrhea   Encephalocele      Operative Findings:  1. Lumbar drain placement with one pass, moderate normal (< 30 cm H2O) opening pressure, clear CSF   2. Multifocal areas of diffuse dural dehiscence covered with Duraflex onlay and Duraseal  3. Harvesting and use of local temporalis muscle flap  4. Repair of partial mastoidectomy and bony dehiscences over tegmen and superior semicircular canal with autograft bone     Procedure:  Prior to induction of anesthesia, an audible huddle was performed confirming site of operation, planned operation, need for antibiotics, and other anticipated needs with the patient, surgical, nursing, and anesthesia teams. All agreed to proceed.      First, the patient was positioned in lateral decubitus position for placement of lumbar subarachnoid drain, which was placed with one pass without any complication. Clear CSF, under moderately high opening pressure, encountered. Lumbar drain was confirmed to be functional and clamped.      Then, he was positioned supine with tilt to left. All pressure points were verified to be padded appropriately. Right temporal region, including the ear, was prepped and draped in sterile fashion. Prior to  starting the operation the surgical team paused and performed an audible timeout. The surgical, nursing, and anesthesia personnel agreed with the procedure to be performed.      A small S-shaped incision on the right temporal scalp overlying the ear was incised with a #10 blade scalpel. The dermal layer was  from the muscle layer using Metzenbaum scissors and bovie while maintaining hemostasis with bipolar electrocautery. The temporalis muscle was kept intact while harvesting a local flap for closing.     Using a high-speed drill, a small bone flap was removed in the floor of the middle fossa then extended accordingly for maximal exposure and visualization of the bony floor. The bone fragments and dust were saved for use as autograft later.     Total of 50 cc of CSF was released throughout the procedure for optimal brain relaxation within the dural dehiscences.     The dura was carefully dissected off the temporal bone using Rhoton microdissecting instruments. The encephaloceles herniating through the middle fossa were cauterized. Hemostasis was achieved with bipolar electrocautery and gelfoam with thrombin.    There was diffusely abnormal tegmen bone and abnormal anatomical landmarks requiring a longer operative time during initial bony exposure, approximately 50% longer than the typical case.    Partial mastoidectomy was performed and external ear canal was marked with Q tip for reorientation. Sigmoid sinus was exposed and preserved.     After dissecting thinned, friable dura from scalloped and diffusely abnormal, dehisced tegmen bone and arcuate eminence, we placed multiple layers of Duraflex onlay covering all the multifocal defects, reinforced with thin layer of Duraseal.     Copious amount of bone fragments and dust were placed on the areas of bony dehiscence in the tegmen and superior semicircular canal.     Hemostasis was achieved and confirmed.     Finally, previously harvested vascularized  temporalis muscle flap was rotated and placed on top of the autograft bone reconstruction at the floor of middle fossa then anchored in place with titanium mesh cranioplasty. Then titanium mesh (large preethi hole cover with slit) cranioplasty was performed.     No significant or sustained changes in neuromonitoring.     Prior to closure, hemostasis was achieved. The wound was copiously irrigated. All retracting devices were removed from the wound. Prior to closure, surgical count was correct and verified x 1. The wound was closed in the usual multi-layered fashion. The muscle and galeal layers were closed with Vicryl sutures. Skin was reapproximated using 4-0 Monocryl sutures in subcutaneous running fashion. The wound was covered with surgical glue.    At the end of the case, a sign out was performed whereby the anesthesia, surgical, and nursing teams re-confirmed the operation performed, any blood products to be distributed, specimens to be sent, or equipment issues. All parties agreed.     The following portions of this surgery were performed directly by me:  -Lumbar drain placement  -Harvesting and use of local temporalis muscle flap   -Initial opening and bony exposure  -Dural repair  -Right-sided craniotomy    The following portions of this surgery were performed in conjunction with Dr. Echols:  -Repair of tegmen tympani defect and superior semicircular canal dehiscence   -Harvesting and use of local temporalis muscle flap   -Initial opening, exposure, and closing of wound     Complications:  None     This surgery required co-surgeons in Neurosurgery (Dr. Oscar Borjas) and ENT (Dr. Briseyda Echols).      Disposition:  Neuro intensive care unit     Oscar Borjas

## 2024-12-16 NOTE — ANESTHESIA PROCEDURE NOTES
Arterial Line Insertion    Performed by: Abraham Muro  Authorized by: Hermilo Fernandez CRNA  Consent: Verbal consent obtained. Written consent obtained.  Risks and benefits: risks, benefits and alternatives were discussed  Consent given by: patient  Patient understanding: patient states understanding of the procedure being performed  Patient consent: the patient's understanding of the procedure matches consent given  Procedure consent: procedure consent matches procedure scheduled  Relevant documents: relevant documents present and verified  Test results: test results available and properly labeled  Site marked: the operative site was marked  Radiology Images: No Radiology Images displayed or report reviewed.    Required items: required blood products, implants, devices, and special equipment available  Patient identity confirmed: arm band and verbally with patient  Preparation: Patient was prepped and draped in the usual sterile fashion.  Indications: hemodynamic monitoring  Orientation:  Left  Location: radial artery  Sedation:  Patient sedated: yes (pt under GA; already sedated)  Sedatives: propofol  Analgesia: see MAR for details    Procedure Details:  Roberto's test normal: yes  Needle gauge: 20  Seldinger technique: Seldinger technique used  Number of attempts: 2    Post-procedure:  Post-procedure: dressing applied  Waveform: good waveform and waveform confirmed  Post-procedure CNS: normal  Patient tolerance: Patient tolerated the procedure well with no immediate complications

## 2024-12-16 NOTE — RESPIRATORY THERAPY NOTE
RT Protocol Note  Anusha Mchugh 69 y.o. female MRN: [id number]  Unit/Bed#: ICU 07 Encounter: [id number]    Assessment    Active Problems:  There are no active Hospital Problems.      Home Pulmonary Medications:         Past Medical History:   Diagnosis Date    Anxiety     Arthritis     CPAP (continuous positive airway pressure) dependence     Depression     Ear problems     GERD (gastroesophageal reflux disease)     HL (hearing loss) Nov 2023    Started as Sporadic. Seems worse now    Hypertension     Nasal congestion     Very frequent    Obesity N/A    PONV (postoperative nausea and vomiting)     Sleep apnea Sept 2009     Social History     Socioeconomic History    Marital status:      Spouse name: None    Number of children: None    Years of education: None    Highest education level: None   Occupational History    None   Tobacco Use    Smoking status: Never    Smokeless tobacco: Never   Vaping Use    Vaping status: Never Used   Substance and Sexual Activity    Alcohol use: Not Currently     Comment: Rarely drink alcohol    Drug use: Yes     Types: Marijuana    Sexual activity: Not Currently     Birth control/protection: Post-menopausal   Other Topics Concern    None   Social History Narrative    None     Social Drivers of Health     Financial Resource Strain: Low Risk  (2/13/2024)    Overall Financial Resource Strain (CARDIA)     Difficulty of Paying Living Expenses: Not hard at all   Food Insecurity: Not on file   Transportation Needs: No Transportation Needs (2/13/2024)    PRAPARE - Transportation     Lack of Transportation (Medical): No     Lack of Transportation (Non-Medical): No   Physical Activity: Not on file   Stress: Not on file   Social Connections: Not on file   Intimate Partner Violence: Not on file   Housing Stability: Not on file       Subjective         Objective    Physical Exam:   Assessment Type: Assess only  General Appearance: Awake, Alert  Respiratory Pattern: Spontaneous,  "Normal  Chest Assessment: Chest expansion symmetrical  Bilateral Breath Sounds: Clear, Diminished    Vitals:  Blood pressure 124/68, pulse 86, temperature (!) 97.4 °F (36.3 °C), temperature source Oral, resp. rate 18, height 5' 2\" (1.575 m), weight 99.8 kg (220 lb), SpO2 99%.          Imaging and other studies: Results Review Statement: No pertinent imaging studies reviewed.          Plan    Respiratory Plan: Discontinue Protocol        Resp Comments: pt admitted for post lumbar placement, pt doesnt have any pulmonary hx,pt said she doesnt take any bronchodilator at home.   "

## 2024-12-17 PROBLEM — E66.01 MORBID (SEVERE) OBESITY DUE TO EXCESS CALORIES (HCC): Status: ACTIVE | Noted: 2024-12-17

## 2024-12-17 LAB
ABO GROUP BLD BPU: NORMAL
ANION GAP SERPL CALCULATED.3IONS-SCNC: 11 MMOL/L (ref 4–13)
ANION GAP SERPL CALCULATED.3IONS-SCNC: 6 MMOL/L (ref 4–13)
ANION GAP SERPL CALCULATED.3IONS-SCNC: 8 MMOL/L (ref 4–13)
APTT PPP: 24 SECONDS (ref 23–34)
BPU ID: NORMAL
BUN SERPL-MCNC: 9 MG/DL (ref 5–25)
CALCIUM SERPL-MCNC: 8.3 MG/DL (ref 8.4–10.2)
CALCIUM SERPL-MCNC: 8.4 MG/DL (ref 8.4–10.2)
CALCIUM SERPL-MCNC: 8.7 MG/DL (ref 8.4–10.2)
CHLORIDE SERPL-SCNC: 101 MMOL/L (ref 96–108)
CHLORIDE SERPL-SCNC: 102 MMOL/L (ref 96–108)
CHLORIDE SERPL-SCNC: 102 MMOL/L (ref 96–108)
CO2 SERPL-SCNC: 25 MMOL/L (ref 21–32)
CO2 SERPL-SCNC: 25 MMOL/L (ref 21–32)
CO2 SERPL-SCNC: 27 MMOL/L (ref 21–32)
CREAT SERPL-MCNC: 0.37 MG/DL (ref 0.6–1.3)
CREAT SERPL-MCNC: 0.41 MG/DL (ref 0.6–1.3)
CREAT SERPL-MCNC: 0.44 MG/DL (ref 0.6–1.3)
CROSSMATCH: NORMAL
ERYTHROCYTE [DISTWIDTH] IN BLOOD BY AUTOMATED COUNT: 13.2 % (ref 11.6–15.1)
GFR SERPL CREATININE-BSD FRML MDRD: 104 ML/MIN/1.73SQ M
GFR SERPL CREATININE-BSD FRML MDRD: 106 ML/MIN/1.73SQ M
GFR SERPL CREATININE-BSD FRML MDRD: 110 ML/MIN/1.73SQ M
GLUCOSE SERPL-MCNC: 163 MG/DL (ref 65–140)
GLUCOSE SERPL-MCNC: 174 MG/DL (ref 65–140)
GLUCOSE SERPL-MCNC: 175 MG/DL (ref 65–140)
HCT VFR BLD AUTO: 37.4 % (ref 34.8–46.1)
HGB BLD-MCNC: 12.3 G/DL (ref 11.5–15.4)
INR PPP: 0.98 (ref 0.85–1.19)
MAGNESIUM SERPL-MCNC: 2.3 MG/DL (ref 1.9–2.7)
MAGNESIUM SERPL-MCNC: 2.6 MG/DL (ref 1.9–2.7)
MCH RBC QN AUTO: 29.6 PG (ref 26.8–34.3)
MCHC RBC AUTO-ENTMCNC: 32.9 G/DL (ref 31.4–37.4)
MCV RBC AUTO: 90 FL (ref 82–98)
PHOSPHATE SERPL-MCNC: 1.5 MG/DL (ref 2.3–4.1)
PHOSPHATE SERPL-MCNC: 2.5 MG/DL (ref 2.3–4.1)
PLATELET # BLD AUTO: 225 THOUSANDS/UL (ref 149–390)
PMV BLD AUTO: 9 FL (ref 8.9–12.7)
POTASSIUM SERPL-SCNC: 2.9 MMOL/L (ref 3.5–5.3)
POTASSIUM SERPL-SCNC: 3.3 MMOL/L (ref 3.5–5.3)
POTASSIUM SERPL-SCNC: 3.5 MMOL/L (ref 3.5–5.3)
PROTHROMBIN TIME: 13.3 SECONDS (ref 12.3–15)
RBC # BLD AUTO: 4.16 MILLION/UL (ref 3.81–5.12)
SODIUM SERPL-SCNC: 134 MMOL/L (ref 135–147)
SODIUM SERPL-SCNC: 135 MMOL/L (ref 135–147)
SODIUM SERPL-SCNC: 138 MMOL/L (ref 135–147)
UNIT DISPENSE STATUS: NORMAL
UNIT PRODUCT CODE: NORMAL
UNIT PRODUCT VOLUME: 350 ML
UNIT RH: NORMAL
WBC # BLD AUTO: 17.82 THOUSAND/UL (ref 4.31–10.16)

## 2024-12-17 PROCEDURE — 85027 COMPLETE CBC AUTOMATED: CPT | Performed by: PHYSICIAN ASSISTANT

## 2024-12-17 PROCEDURE — 99233 SBSQ HOSP IP/OBS HIGH 50: CPT | Performed by: INTERNAL MEDICINE

## 2024-12-17 PROCEDURE — 80048 BASIC METABOLIC PNL TOTAL CA: CPT | Performed by: STUDENT IN AN ORGANIZED HEALTH CARE EDUCATION/TRAINING PROGRAM

## 2024-12-17 PROCEDURE — 85610 PROTHROMBIN TIME: CPT | Performed by: PHYSICIAN ASSISTANT

## 2024-12-17 PROCEDURE — 97163 PT EVAL HIGH COMPLEX 45 MIN: CPT

## 2024-12-17 PROCEDURE — 85730 THROMBOPLASTIN TIME PARTIAL: CPT | Performed by: PHYSICIAN ASSISTANT

## 2024-12-17 PROCEDURE — 97167 OT EVAL HIGH COMPLEX 60 MIN: CPT

## 2024-12-17 PROCEDURE — 83735 ASSAY OF MAGNESIUM: CPT | Performed by: STUDENT IN AN ORGANIZED HEALTH CARE EDUCATION/TRAINING PROGRAM

## 2024-12-17 PROCEDURE — 80048 BASIC METABOLIC PNL TOTAL CA: CPT | Performed by: PHYSICIAN ASSISTANT

## 2024-12-17 PROCEDURE — 99024 POSTOP FOLLOW-UP VISIT: CPT | Performed by: NEUROLOGICAL SURGERY

## 2024-12-17 PROCEDURE — 84100 ASSAY OF PHOSPHORUS: CPT | Performed by: STUDENT IN AN ORGANIZED HEALTH CARE EDUCATION/TRAINING PROGRAM

## 2024-12-17 RX ORDER — PROMETHAZINE HYDROCHLORIDE 25 MG/ML
12.5 INJECTION, SOLUTION INTRAMUSCULAR; INTRAVENOUS EVERY 6 HOURS PRN
Status: DISCONTINUED | OUTPATIENT
Start: 2024-12-17 | End: 2024-12-17

## 2024-12-17 RX ORDER — MAGNESIUM SULFATE HEPTAHYDRATE 40 MG/ML
2 INJECTION, SOLUTION INTRAVENOUS ONCE
Status: COMPLETED | OUTPATIENT
Start: 2024-12-17 | End: 2024-12-17

## 2024-12-17 RX ORDER — PANTOPRAZOLE SODIUM 40 MG/10ML
40 INJECTION, POWDER, LYOPHILIZED, FOR SOLUTION INTRAVENOUS
Status: DISCONTINUED | OUTPATIENT
Start: 2024-12-17 | End: 2024-12-18

## 2024-12-17 RX ORDER — POTASSIUM CHLORIDE 14.9 MG/ML
20 INJECTION INTRAVENOUS
Status: COMPLETED | OUTPATIENT
Start: 2024-12-17 | End: 2024-12-18

## 2024-12-17 RX ORDER — POTASSIUM CHLORIDE 14.9 MG/ML
20 INJECTION INTRAVENOUS ONCE
Status: COMPLETED | OUTPATIENT
Start: 2024-12-17 | End: 2024-12-17

## 2024-12-17 RX ORDER — FAMOTIDINE 10 MG/ML
20 INJECTION, SOLUTION INTRAVENOUS EVERY 12 HOURS SCHEDULED
Status: DISCONTINUED | OUTPATIENT
Start: 2024-12-17 | End: 2024-12-21 | Stop reason: HOSPADM

## 2024-12-17 RX ORDER — POTASSIUM CHLORIDE 29.8 MG/ML
40 INJECTION INTRAVENOUS ONCE
Status: DISCONTINUED | OUTPATIENT
Start: 2024-12-17 | End: 2024-12-17

## 2024-12-17 RX ORDER — ACETAMINOPHEN 10 MG/ML
1000 INJECTION, SOLUTION INTRAVENOUS EVERY 8 HOURS
Status: COMPLETED | OUTPATIENT
Start: 2024-12-17 | End: 2024-12-18

## 2024-12-17 RX ORDER — METOCLOPRAMIDE HYDROCHLORIDE 5 MG/ML
10 INJECTION INTRAMUSCULAR; INTRAVENOUS EVERY 6 HOURS PRN
Status: DISCONTINUED | OUTPATIENT
Start: 2024-12-17 | End: 2024-12-17

## 2024-12-17 RX ORDER — POTASSIUM CHLORIDE 14.9 MG/ML
20 INJECTION INTRAVENOUS ONCE
Status: COMPLETED | OUTPATIENT
Start: 2024-12-18 | End: 2024-12-18

## 2024-12-17 RX ADMIN — SODIUM CHLORIDE, SODIUM LACTATE, POTASSIUM CHLORIDE, AND CALCIUM CHLORIDE 125 ML/HR: .6; .31; .03; .02 INJECTION, SOLUTION INTRAVENOUS at 17:25

## 2024-12-17 RX ADMIN — FAMOTIDINE 20 MG: 10 INJECTION, SOLUTION INTRAVENOUS at 11:17

## 2024-12-17 RX ADMIN — SODIUM CHLORIDE, SODIUM LACTATE, POTASSIUM CHLORIDE, AND CALCIUM CHLORIDE 125 ML/HR: .6; .31; .03; .02 INJECTION, SOLUTION INTRAVENOUS at 01:36

## 2024-12-17 RX ADMIN — FAMOTIDINE 20 MG: 10 INJECTION, SOLUTION INTRAVENOUS at 21:00

## 2024-12-17 RX ADMIN — POTASSIUM CHLORIDE 20 MEQ: 14.9 INJECTION, SOLUTION INTRAVENOUS at 06:39

## 2024-12-17 RX ADMIN — DEXAMETHASONE SODIUM PHOSPHATE 4 MG: 4 INJECTION INTRA-ARTICULAR; INTRALESIONAL; INTRAMUSCULAR; INTRAVENOUS; SOFT TISSUE at 05:54

## 2024-12-17 RX ADMIN — TRIMETHOBENZAMIDE HYDROCHLORIDE 200 MG: 100 INJECTION INTRAMUSCULAR at 22:40

## 2024-12-17 RX ADMIN — DEXAMETHASONE SODIUM PHOSPHATE 4 MG: 4 INJECTION INTRA-ARTICULAR; INTRALESIONAL; INTRAMUSCULAR; INTRAVENOUS; SOFT TISSUE at 00:41

## 2024-12-17 RX ADMIN — CEFAZOLIN SODIUM 1000 MG: 1 SOLUTION INTRAVENOUS at 08:42

## 2024-12-17 RX ADMIN — PANTOPRAZOLE SODIUM 40 MG: 40 INJECTION, POWDER, FOR SOLUTION INTRAVENOUS at 14:01

## 2024-12-17 RX ADMIN — ACETAMINOPHEN 1000 MG: 10 INJECTION INTRAVENOUS at 18:05

## 2024-12-17 RX ADMIN — MAGNESIUM SULFATE HEPTAHYDRATE 2 G: 40 INJECTION, SOLUTION INTRAVENOUS at 11:19

## 2024-12-17 RX ADMIN — DEXAMETHASONE SODIUM PHOSPHATE 4 MG: 4 INJECTION INTRA-ARTICULAR; INTRALESIONAL; INTRAMUSCULAR; INTRAVENOUS; SOFT TISSUE at 11:16

## 2024-12-17 RX ADMIN — POTASSIUM CHLORIDE 20 MEQ: 14.9 INJECTION, SOLUTION INTRAVENOUS at 11:16

## 2024-12-17 RX ADMIN — DEXAMETHASONE SODIUM PHOSPHATE 4 MG: 4 INJECTION INTRA-ARTICULAR; INTRALESIONAL; INTRAMUSCULAR; INTRAVENOUS; SOFT TISSUE at 17:22

## 2024-12-17 RX ADMIN — SODIUM PHOSPHATE, MONOBASIC, MONOHYDRATE AND SODIUM PHOSPHATE, DIBASIC, ANHYDROUS 30 MMOL: 142; 276 INJECTION, SOLUTION INTRAVENOUS at 17:39

## 2024-12-17 RX ADMIN — METOCLOPRAMIDE 10 MG: 5 INJECTION, SOLUTION INTRAMUSCULAR; INTRAVENOUS at 08:21

## 2024-12-17 RX ADMIN — ONDANSETRON 4 MG: 2 INJECTION INTRAMUSCULAR; INTRAVENOUS at 18:22

## 2024-12-17 RX ADMIN — HYDROMORPHONE HYDROCHLORIDE 0.2 MG: 0.2 INJECTION, SOLUTION INTRAMUSCULAR; INTRAVENOUS; SUBCUTANEOUS at 20:59

## 2024-12-17 RX ADMIN — POTASSIUM CHLORIDE 20 MEQ: 14.9 INJECTION, SOLUTION INTRAVENOUS at 19:31

## 2024-12-17 RX ADMIN — HYDROMORPHONE HYDROCHLORIDE 0.2 MG: 0.2 INJECTION, SOLUTION INTRAMUSCULAR; INTRAVENOUS; SUBCUTANEOUS at 09:35

## 2024-12-17 RX ADMIN — MAGNESIUM SULFATE HEPTAHYDRATE 2 G: 40 INJECTION, SOLUTION INTRAVENOUS at 06:43

## 2024-12-17 RX ADMIN — HYDROMORPHONE HYDROCHLORIDE 0.2 MG: 0.2 INJECTION, SOLUTION INTRAMUSCULAR; INTRAVENOUS; SUBCUTANEOUS at 18:22

## 2024-12-17 RX ADMIN — POTASSIUM & SODIUM PHOSPHATES POWDER PACK 280-160-250 MG 2 PACKET: 280-160-250 PACK at 08:24

## 2024-12-17 RX ADMIN — ONDANSETRON 4 MG: 2 INJECTION INTRAMUSCULAR; INTRAVENOUS at 12:32

## 2024-12-17 RX ADMIN — LEVETIRACETAM 500 MG: 100 INJECTION, SOLUTION INTRAVENOUS at 21:00

## 2024-12-17 RX ADMIN — POLYETHYLENE GLYCOL 3350 17 G: 17 POWDER, FOR SOLUTION ORAL at 08:20

## 2024-12-17 RX ADMIN — POTASSIUM CHLORIDE 20 MEQ: 14.9 INJECTION, SOLUTION INTRAVENOUS at 08:59

## 2024-12-17 RX ADMIN — HYDROMORPHONE HYDROCHLORIDE 0.2 MG: 0.2 INJECTION, SOLUTION INTRAMUSCULAR; INTRAVENOUS; SUBCUTANEOUS at 05:17

## 2024-12-17 RX ADMIN — ONDANSETRON 4 MG: 2 INJECTION INTRAMUSCULAR; INTRAVENOUS at 06:37

## 2024-12-17 RX ADMIN — POTASSIUM CHLORIDE 20 MEQ: 14.9 INJECTION, SOLUTION INTRAVENOUS at 16:39

## 2024-12-17 RX ADMIN — CEFAZOLIN SODIUM 1000 MG: 1 SOLUTION INTRAVENOUS at 16:25

## 2024-12-17 RX ADMIN — CEFAZOLIN SODIUM 1000 MG: 1 SOLUTION INTRAVENOUS at 00:41

## 2024-12-17 RX ADMIN — ACETAMINOPHEN 1000 MG: 10 INJECTION INTRAVENOUS at 11:17

## 2024-12-17 RX ADMIN — METOCLOPRAMIDE 10 MG: 5 INJECTION, SOLUTION INTRAMUSCULAR; INTRAVENOUS at 16:26

## 2024-12-17 RX ADMIN — LEVETIRACETAM 500 MG: 100 INJECTION, SOLUTION INTRAVENOUS at 08:29

## 2024-12-17 NOTE — PHYSICAL THERAPY NOTE
Physical Therapy Evaluation    Patient's Name: Anusha Mchugh    Admitting Diagnosis  CSF otorrhea [G96.01]    Problem List  Patient Active Problem List   Diagnosis    Primary hypertension    Abnormal mammogram of right breast    Allergies    Vitamin D deficiency    Anxiety and depression    Generalized arthritis    Mild intermittent asthma with acute exacerbation    Gastroesophageal reflux disease without esophagitis    History of bilateral knee arthroplasty    Sleep apnea treated with continuous positive airway pressure (CPAP)    Decreased hearing of right ear    LOUISE (obstructive sleep apnea)    Prediabetes    Preop examination    CSF otorrhea    MORBID (SEVERE) OBESITY DUE TO EXCESS CALORIES       Past Medical History  Past Medical History:   Diagnosis Date    Anxiety     Arthritis     CPAP (continuous positive airway pressure) dependence     Depression     Ear problems     GERD (gastroesophageal reflux disease)     HL (hearing loss) Nov 2023    Started as Sporadic. Seems worse now    Hypertension     Nasal congestion     Very frequent    Obesity N/A    PONV (postoperative nausea and vomiting)     Sleep apnea Sept 2009       Past Surgical History  Past Surgical History:   Procedure Laterality Date    BREAST SURGERY      cyst removal on bilateral breast    ECTOPIC PREGNANCY SURGERY      EYE SURGERY      cataract    KNEE SURGERY      RETROMASTOID CRANIOTOMY Right 12/16/2024    Procedure: CRANIOTOMY MIDDLE FOSSA,RETROMASTOID APPROACH FOR ENT;  Surgeon: Briseyda Echols MD;  Location: [facility];  Service: ENT    TEGMAN DEFECT REPAIR CSF LEAK Right 12/16/2024    Procedure: Right endoscopic middle fossa craniotomy for repair of tegmen defect and CSF leak with temporalis fascia or muscle flap, with neuromonitoring (CN 7/8) and intraoperative lumbar drain placement;  Surgeon: Oscar Borjas MD;  Location: [facility];  Service: Neurosurgery        12/17/24 0915   PT Last Visit   PT Visit Date 12/17/24   Note Type    Note type Evaluation   Pain Assessment   Pain Assessment Tool 0-10   Pain Score No Pain   Restrictions/Precautions   Weight Bearing Precautions Per Order No   Other Precautions Chair Alarm;Bed Alarm;Multiple lines;Fall Risk  (a-line, lumbar drain)   Home Living   Type of Home House   Home Layout Two level;Bed/bath upstairs  (2 ABEL)   Bathroom Shower/Tub Tub/shower unit   Bathroom Toilet Raised   Home Equipment Cane   Prior Function   Level of Christian Independent with ADLs;Independent with functional mobility;Independent with IADLS  (I ambulation w/o AD)   Lives With Daughter  (dtr works during the day)   Receives Help From Family   IADLs Independent with driving;Independent with meal prep;Independent with medication management   Falls in the last 6 months 0   Vocational Retired   General   Family/Caregiver Present No   Cognition   Arousal/Participation Alert   Orientation Level Oriented X4   Comments pleasant + cooperative   Subjective   Subjective Agreeable to mobilize.   RLE Assessment   RLE Assessment   (4+/5)   LLE Assessment   LLE Assessment   (4+/5)   Coordination   Movements are Fluid and Coordinated 1   Sensation WFL   Heel to Shin Intact  (BLE)   Bed Mobility   Supine to Sit 4  Minimal assistance   Additional items Assist x 1;HOB elevated;Increased time required;Verbal cues;LE management   Sit to Supine Unable to assess   Additional Comments Pt greeted in supine.   Transfers   Sit to Stand 4  Minimal assistance   Additional items Assist x 1;Increased time required;Verbal cues   Stand to Sit 4  Minimal assistance   Additional items Assist x 1;Increased time required;Verbal cues   Additional Comments RW   Ambulation/Elevation   Gait pattern Excessively slow;Short stride;Decreased foot clearance;Decreased heel strike   Gait Assistance 4  Minimal assist   Additional items Assist x 1;Tactile cues;Verbal cues   Assistive Device Rolling walker   Distance 40'x2   Stair Management Assistance Not tested    Balance   Static Sitting Fair +   Dynamic Sitting Fair   Static Standing Fair -   Dynamic Standing Poor +   Ambulatory Poor +  (RW)   Endurance Deficit   Endurance Deficit Yes   Endurance Deficit Description weakness, fatigue   Activity Tolerance   Activity Tolerance Patient limited by fatigue;Patient tolerated treatment well   Medical Staff Made Aware CIRA Horowitz   Nurse Made Aware yes - cleared for therapy   Assessment   Prognosis Excellent   Problem List Decreased strength;Decreased endurance;Impaired balance;Decreased mobility;Impaired judgement   Assessment Pt is 69 y.o. female seen for a PT evaluation s/p admit to [facility] on [facility]16[facility]. Pt presenting w/ principal dx of CSF otorrhea, now s/p craniotomy middle fossa 12/17/24. Please see above for other active problem list / PMH.     PT now consulted to assess functional mobility and needs for safe d/c planning. Prior to admission, pt was I w/ ambulation w/o AD.     Currently pt requires [place] for bed skills; [place] for functional transfers; [place] for ambulation w/ RW. Pt presents functioning below baseline and w/ overall mobility deficits 2* to: decreased LE strength; generalized weakness/deconditioning; decreased endurance; impaired balance; gait deviations; reliance on more restrictive AD compared to baseline; fatigue; bed/chair alarms; multiple lines. These impairments place pt at risk for falls.     Pt will continue to benefit from skilled PT interventions to address stated impairments; to maximize functional potential; for ongoing pt/ family training; and DME needs. PT is currently recommending HHPT.   Goals   Patient Goals go home   STG Expiration Date 12/31/24   Short Term Goal #1 In 14 days pt will complete: 1) Bed mobility skills with Sherly to facilitate safe return to previous living environment. 2) Functional transfers with Sherly to facilitate safe return to previous living environment. 3) Ambulation with least restrictive ' w/ Sherly  without LOB for safe ambulation in home/community environment. 4) Improve balance scores by 1 grade to decrease fall risk. 5) Improve LE strength grades by 1 to increase independence w/ all functional mobility, transfers and gait. 6) PT for ongoing pt and family education; DME needs and D/C planning to promote highest level of function in least restrictive environment. 7) Stair training up/ down 12 steps with most appropriate technique and Sherly for safe access to previous living environment and to increase community access.   Plan   Treatment/Interventions Functional transfer training;LE strengthening/ROM;Elevations;Therapeutic exercise;Endurance training;Patient/family training;Equipment eval/education;Bed mobility;Compensatory technique education;Gait training;Spoke to nursing;OT   PT Frequency 3-5x/wk   Discharge Recommendation   Rehab Resource Intensity Level, PT III (Minimum Resource Intensity)   Equipment Recommended Walker   Walker Package Recommended Wheeled walker   Change/add to Walker Package? No   AM-PAC Basic Mobility Inpatient   Turning in Flat Bed Without Bedrails 3   Lying on Back to Sitting on Edge of Flat Bed Without Bedrails 3   Moving Bed to Chair 3   Standing Up From Chair Using Arms 3   Walk in Room 3   Climb 3-5 Stairs With Railing 3   Basic Mobility Inpatient Raw Score 18   Basic Mobility Standardized Score 41.05   [facility] Highest Level Of Mobility   JH-HLM Goal 6: Walk 10 steps or more   JH-HLM Achieved 7: Walk 25 feet or more   End of Consult   Patient Position at End of Consult Bedside chair;Bed/Chair alarm activated;All needs within reach  (on waffle cushion, all lines in tact)     Lou Graham, PT, DPT

## 2024-12-17 NOTE — PROGRESS NOTES
Progress Note - Neurosurgery   Name: Anusha Mchugh 69 y.o. female I MRN: [id number]  Unit/Bed#: ICU 07 I Date of Admission: 12/16/2024   Date of Service: 12/17/2024 I Hospital Day: 1    Assessment & Plan  CSF otorrhea  POD 1 CRANIOTOMY MIDDLE FOSSA,RETROMASTOID APPROACH FOR ENT  Right endoscopic middle fossa craniotomy for repair of tegmen defect and CSF leak with temporalis fascia or muscle flap, with neuromonitoring (CN 7/8) and intraoperative lumbar drain placement ([place] / [place], 12/16/24)    Imaging:  CT head w/o, 12/16/24: New right craniectomy and mastoidectomy. Fluid, foci of air and high attenuation material in the mastoid cavity compatible with surgical material. Soft tissue swelling.     Plan:  Continue to monitor neurological exam  STAT CT head w/o for decline in GCS greater than 2 points in 1 hour  Lumbar drain in place  10cc/hr, keep at level of insertion site  Ancef while drain in place  Tentative plan to clamp Wednesday, remove Thursday, discharge patient Friday  ENT following, appreciate recommendations  Slight drainage from ear, appears bloody  R otowick in place, keep for 3 days  No antibiotic drop to ear  NCC consulted for ICU management, appreciate recommendations  SBP < 160  Medical management for post op nausea, PRN zofran and reglan ordered  D/c gomez  Post op labs reviewed. Expected leukocytosis 2/2 steroids and post op state  Decadron x48h for vasogenic edema  PPI while on steroids  Keppra x7d for seizure ppx  PT/OT evaluation, ok to mobilize  DVT ppx: SCDs, hold pharm ppx today    Neurosurgery will continue to follow as primary team. Please call with questions or concerns.   Decreased hearing of right ear  Secondary to tegman defect, present on arrival  MORBID (SEVERE) OBESITY DUE TO EXCESS CALORIES  Recommend lifestyle modification         Subjective   Patient with slight drainage from R ear overnight and post op nausea / vomiting. She is c/o headache and nausea currently. She denies  new neurological deficits but has persistent R hearing loss. She ambulated with PT earlier this morning. LD functioning well.     Objective :  Temp:  [97.4 °F (36.3 °C)-98.6 °F (37 °C)] 98.1 °F (36.7 °C)  HR:  [] 84  BP: (124-150)/(63-89) 150/89  Resp:  [17-24] 22  SpO2:  [94 %-100 %] 97 %  O2 Device: Nasal cannula  Nasal Cannula O2 Flow Rate (L/min):  [2 L/min-4 L/min] 2 L/min    I/O         12/15 0701 12/16 0700 12/16 0701 12/17 0700 12/17 0701 12/18 0700    P.O.  30     I.V. (mL/kg)  4011 (40.2)     IV Piggyback  600     Total Intake(mL/kg)  4641 (46.5)     Urine (mL/kg/hr)  3625 275 (1)    Emesis/NG output  2     Drains  130     Other  50     Blood  50     Total Output  3857 275    Net  +784 -275           Unmeasured Emesis Occurrence  1 x           Physical Exam Neurological Exam  General appearance: alert, appears stated age, cooperative and no distress  Head: gauze on R ear with bloody drainage, no obvious active drainage from ear but gauze wet. Wick in place.   Eyes: EOMI, PERRL  Neck: supple, symmetrical, trachea midline  Back: lumbar drain in place  Lungs: non labored breathing  Heart: regular heart rate  Neurologic:   Mental status: Alert, oriented, thought content appropriate  Cranial nerves: grossly intact (Cranial nerves II-XII) except R hearing loss to FR  Sensory: normal to LT  Motor: moving all extremities without focal weakness   Coordination: finger to nose normal bilaterally, no drift bilaterally      Lab Results: I have reviewed the following results:  Recent Labs     12/16/24  1248 12/16/24  1816 12/17/24  0514   WBC  --   --  17.82*   HGB 12.2  --  12.3   HCT 36  --  37.4   PLT  --   --  225   SODIUM  --   --  138   K  --   --  2.9*   CL  --   --  102   CO2 22  --  25   BUN  --   --  9   CREATININE  --   --  0.44*   GLUC  --   --  163*   CAIONIZED 1.12  --   --    MG  --  1.5*  --    PHOS  --  2.6  --    PTT  --   --  24   INR  --   --  0.98       Imaging Results Review: I personally  reviewed the following image studies in PACS and associated radiology reports: CT head. My interpretation of the radiology images/reports is: as above.      VTE Pharmacologic Prophylaxis: Sequential compression device (Venodyne)

## 2024-12-17 NOTE — PLAN OF CARE
Problem: PHYSICAL THERAPY ADULT  Goal: Performs mobility at highest level of function for planned discharge setting.  See evaluation for individualized goals.  Description: Treatment/Interventions: Functional transfer training, LE strengthening/ROM, Elevations, Therapeutic exercise, Endurance training, Patient/family training, Equipment eval/education, Bed mobility, Compensatory technique education, Gait training, Spoke to nursing, OT  Equipment Recommended: Walker       See flowsheet documentation for full assessment, interventions and recommendations.  Note: Prognosis: Excellent  Problem List: Decreased strength, Decreased endurance, Impaired balance, Decreased mobility, Impaired judgement  Assessment: Pt is 69 y.o. female seen for a PT evaluation s/p admit to [facility] on 12/16/2024. Pt presenting w/ principal dx of CSF otorrhea, now s/p craniotomy middle fossa 12/17/24. Please see above for other active problem list / PMH. PT now consulted to assess functional mobility and needs for safe d/c planning. Prior to admission, pt was I w/ ambulation w/o AD. Currently pt requires [place] for bed skills; [place] for functional transfers; [place] for ambulation w/ RW. Pt presents functioning below baseline and w/ overall mobility deficits 2* to: decreased LE strength; generalized weakness/deconditioning; decreased endurance; impaired balance; gait deviations; reliance on more restrictive AD compared to baseline; fatigue; bed/chair alarms; multiple lines. These impairments place pt at risk for falls. Pt will continue to benefit from skilled PT interventions to address stated impairments; to maximize functional potential; for ongoing pt/ family training; and DME needs. PT is currently recommending HHPT.        Rehab Resource Intensity Level, PT: III (Minimum Resource Intensity)    See flowsheet documentation for full assessment.

## 2024-12-17 NOTE — PLAN OF CARE
"  Problem: OCCUPATIONAL THERAPY ADULT  Goal: Performs self-care activities at highest level of function for planned discharge setting.  See evaluation for individualized goals.  Description: Treatment Interventions: ADL retraining, Functional transfer training, UE strengthening/ROM, Endurance training, Patient/family training, Equipment evaluation/education, Compensatory technique education, Continued evaluation, Energy conservation, Activityengagement          See flowsheet documentation for full assessment, interventions and recommendations.   Note: Limitation: Decreased ADL status, Decreased endurance, Decreased self-care trans, Decreased high-level ADLs  Prognosis: Fair  Assessment: Pt is a 70 yo female admitted to [facility] s/p \"Placement of lumbar drain at L3/4, Right endoscopic middle fossa craniectomy for repair of CSF leak and tegmen defect with Duraflex onlay and Duraseal using neuromonitoring\" on 12/16. Pt  has a past medical history of Anxiety, Arthritis, CPAP (continuous positive airway pressure) dependence, Depression, Ear problems, GERD (gastroesophageal reflux disease), HL (hearing loss) (Nov 2023), Hypertension, Nasal congestion, Obesity (N/A), PONV (postoperative nausea and vomiting), and Sleep apnea (Sept 2009). Pt with active OT orders in which OT consulted to assess pt's functional status and occupational performance to determine safe d/c needs. Pt seen with PT to increase safety, decrease fall risk, and maximize functional/occupational performance 2* medical complexity which is a regression from pt's functional baseline. Pt lives with her daughter in a 2  with 2 ABEL. PTA, pt was independent in ADLs/IADLs and uses no DME for functional mobility. (+) driving. Currently, pt performing bed mobility w/ Min A, functional transfers/mobility w/ Min A x1 w/ RW, and UB/LB ADLs w/ Min A. Pt demonstrates the following limitations/impairments which impact the pt's ability to engage in valued occupations: " balance, endurance/activity tolerance, standing tolerance, functional reach, postural/trunk control, strength, safety awareness, and pain. From an OT standpoint, recommend discharge w/ Level 3 resources once medically stable. The patient's raw score on the -PAC Daily Activity Inpatient Short Form is 19. A raw score of greater than or equal to 19 suggests the patient may benefit from discharge to home. Please refer to the recommendation of the Occupational Therapist for safe discharge planning.  Pt would benefit from skilled OT services 2-3x/wk to address acute care needs and underlying performance skills to promote safety, decrease fall risk, and enhance occupational performance to return to PLOF. Goals to be met within the next 10-14 days.     Rehab Resource Intensity Level, OT: III (Minimum Resource Intensity)

## 2024-12-17 NOTE — OCCUPATIONAL THERAPY NOTE
Occupational Therapy Evaluation     Patient Name: Anusha Mchugh  Today's Date: 12/17/2024  Problem List  Active Problems:    Decreased hearing of right ear    CSF otorrhea    MORBID (SEVERE) OBESITY DUE TO EXCESS CALORIES    Past Medical History  Past Medical History:   Diagnosis Date    Anxiety     Arthritis     CPAP (continuous positive airway pressure) dependence     Depression     Ear problems     GERD (gastroesophageal reflux disease)     HL (hearing loss) Nov 2023    Started as Sporadic. Seems worse now    Hypertension     Nasal congestion     Very frequent    Obesity N/A    PONV (postoperative nausea and vomiting)     Sleep apnea Sept 2009     Past Surgical History  Past Surgical History:   Procedure Laterality Date    BREAST SURGERY      cyst removal on bilateral breast    ECTOPIC PREGNANCY SURGERY      EYE SURGERY      cataract    KNEE SURGERY      RETROMASTOID CRANIOTOMY Right 12/16/2024    Procedure: CRANIOTOMY MIDDLE FOSSA,RETROMASTOID APPROACH FOR ENT;  Surgeon: Briseyda Echols MD;  Location: [facility];  Service: ENT    TEGMAN DEFECT REPAIR CSF LEAK Right 12/16/2024    Procedure: Right endoscopic middle fossa craniotomy for repair of tegmen defect and CSF leak with temporalis fascia or muscle flap, with neuromonitoring (CN 7/8) and intraoperative lumbar drain placement;  Surgeon: Oscar Borjas MD;  Location: [facility];  Service: Neurosurgery         12/17/24 0856   OT Last Visit   OT Visit Date 12/17/24   Note Type   Note type Evaluation   Additional Comments Pt seen w/ PT to increase safety, decrease fall risk, and maximize functional/occupational performance 2* medical complexity which is a regression from pt's functional baseline.   Pain Assessment   Pain Assessment Tool 0-10   Pain Score No Pain   Effect of Pain on Daily Activities Pt reporting no pain, but nausea @ this time   Hospital Pain Intervention(s) Repositioned;Ambulation/increased activity;Emotional support  "  Restrictions/Precautions   Weight Bearing Precautions Per Order No   Other Precautions Multiple lines;Telemetry;Fall Risk;Pain  (A-line, lumbar drain)   Home Living   Type of Home House  (2  with 2 ABEL)   Home Layout Two level;Bed/bath upstairs;Access   Bathroom Shower/Tub Tub/shower unit   Bathroom Toilet Raised   Bathroom Accessibility Accessible   Home Equipment Cane   Additional Comments Pt reports living with her daughter in a 2  with 2 ABEL, reporting no bathroom on 1st floor with full flight to access bathroom and bedroom; no DME PTA, but has access to SPC   Prior Function   Level of [place] Independent with ADLs;Independent with functional mobility;Independent with IADLS   Lives With Daughter   Receives Help From Family   IADLs Independent with driving;Independent with meal prep;Independent with medication management   Falls in the last 6 months 0   Vocational Retired   Comments (+) driving; reports that her daughter works during the day   Lifestyle   Autonomy PTA, pt was independent in ADLs/IADLs and uses no DME for functional mobility; (+) driving   Reciprocal Relationships Lives with her daughter who works during the day   Service to Others Retired   Intrinsic Gratification Enjoys sewing   Subjective   Subjective \"I am nauseous.\"   ADL   Where Assessed Chair   Eating Assistance 5  Supervision/Setup   Grooming Assistance 5  Supervision/Setup   UB Bathing Assistance 4  Minimal Assistance   LB Bathing Assistance 4  Minimal Assistance   UB Dressing Assistance 4  Minimal Assistance   LB Dressing Assistance 4  Minimal Assistance   Toileting Assistance  4  Minimal Assistance   Functional Assistance 4  Minimal Assistance   Bed Mobility   Supine to Sit 4  Minimal assistance   Additional items Assist x 1;HOB elevated;Bedrails;Increased time required;Verbal cues;LE management   Sit to Supine Unable to assess   Additional Comments At end of session, pt left sitting upright in recliner with all functional " "needs in reach   Transfers   Sit to Stand 4  Minimal assistance   Additional items Assist x 1;Increased time required;Verbal cues   Stand to Sit 4  Minimal assistance   Additional items Assist x 1;Increased time required;Verbal cues   Additional Comments w/ RW   Functional Mobility   Functional Mobility 4  Minimal assistance   Additional Comments Pt completed short household functional mobility distances w/ Min A x1 w/ RW for safety and support with chair follow for safety however pt requiring 0 rest breaks @ this time   Additional items Rolling walker   Balance   Static Sitting Fair +   Dynamic Sitting Fair   Static Standing Fair -   Dynamic Standing Poor +   Ambulatory Poor +   Activity Tolerance   Activity Tolerance Patient limited by fatigue;Patient limited by pain;Patient tolerated treatment well   Medical Staff Made Aware [facility] Blake   Nurse Made Aware RN cleared; neurosurg present @ times throughout session   RUE Assessment   RUE Assessment WFL   LUE Assessment   LUE Assessment WFL   Hand Function   Gross Motor Coordination Functional   Fine Motor Coordination Functional   Cognition   Overall Cognitive Status WFL   Arousal/Participation Responsive;Arousable;Cooperative   Attention Within functional limits   Orientation Level Oriented X4   Memory Within functional limits   Following Commands Follows one step commands without difficulty   Comments Pt pleasant and cooperative; limited by fatigue and nausea @ this time, flat affect   Assessment   Limitation Decreased ADL status;Decreased endurance;Decreased self-care trans;Decreased high-level ADLs   Prognosis Fair   Assessment Pt is a 68 yo female admitted to [facility] s/p \"Placement of lumbar drain at L3/4, Right endoscopic middle fossa craniectomy for repair of CSF leak and tegmen defect with Duraflex onlay and Duraseal using neuromonitoring\" on 12/16. Pt  has a past medical history of Anxiety, Arthritis, CPAP (continuous positive airway pressure) dependence, " Depression, Ear problems, GERD (gastroesophageal reflux disease), HL (hearing loss) (Nov 2023), Hypertension, Nasal congestion, Obesity (N/A), PONV (postoperative nausea and vomiting), and Sleep apnea (Sept 2009). Pt with active OT orders in which OT consulted to assess pt's functional status and occupational performance to determine safe d/c needs. Pt seen with PT to increase safety, decrease fall risk, and maximize functional/occupational performance 2* medical complexity which is a regression from pt's functional baseline. Pt lives with her daughter in a 2  with 2 ABEL. PTA, pt was independent in ADLs/IADLs and uses no DME for functional mobility. (+) driving. Currently, pt performing bed mobility w/ Min A, functional transfers/mobility w/ Min A x1 w/ RW, and UB/LB ADLs w/ Min A. Pt demonstrates the following limitations/impairments which impact the pt's ability to engage in valued occupations: balance, endurance/activity tolerance, standing tolerance, functional reach, postural/trunk control, strength, safety awareness, and pain. From an OT standpoint, recommend discharge w/ Level 3 resources once medically stable. The patient's raw score on the AM-PAC Daily Activity Inpatient Short Form is 19. A raw score of greater than or equal to 19 suggests the patient may benefit from discharge to home. Please refer to the recommendation of the Occupational Therapist for safe discharge planning.  Pt would benefit from skilled OT services 2-3x/wk to address acute care needs and underlying performance skills to promote safety, decrease fall risk, and enhance occupational performance to return to PLOF. Goals to be met within the next 10-14 days.   Goals   Patient Goals To go home   LTG Time Frame 10-14   Long Term Goal #1 See OT goals listed below   Plan   Treatment Interventions ADL retraining;Functional transfer training;UE strengthening/ROM;Endurance training;Patient/family training;Equipment  evaluation/education;Compensatory technique education;Continued evaluation;Energy conservation;Activityengagement   Goal Expiration Date 12/31/24   OT Frequency 2-3x/wk   Discharge Recommendation   Rehab Resource Intensity Level, OT III (Minimum Resource Intensity)   AM-PAC Daily Activity Inpatient   Lower Body Dressing 3   Bathing 3   Toileting 3   Upper Body Dressing 3   Grooming 3   Eating 4   Daily Activity Raw Score 19   Daily Activity Standardized Score (Calc for Raw Score >=11) 40.22   AM-PAC Applied Cognition Inpatient   Following a Speech/Presentation 3   Understanding Ordinary Conversation 4   Taking Medications 3   Remembering Where Things Are Placed or Put Away 3   Remembering List of 4-5 Errands 3   Taking Care of Complicated Tasks 3   Applied Cognition Raw Score 19   Applied Cognition Standardized Score 39.77   End of Consult   Education Provided Yes   Patient Position at End of Consult Bedside chair;All needs within reach   Nurse Communication Nurse aware of consult       OT GOALS:    Pt will improve functional mobility during ADL/IADL/leisure tasks with Mod I using AE/DME prn.    Pt will improve activity tolerance/functional endurance during ADL/IADL/leisure tasks for at least 20 minutes to improve occupational performance and engagement in valued occupations using AE/DME prn.    Pt will engage in ongoing functional/formal cognitive assessments to assist with safe d/c planning and increase safety during functional tasks.    Pt will participate in simulated IADL management task w/ Mod I to increase independence and engagement in valued occupations w/ good balance/safety.    Pt will improve dynamic standing balance for at least 15 minutes with Mod I during functional tasks to decrease fall risk and improve independence and engagement in ADL/IADL/leisure activities.    Pt will follow 100% of multi-step commands in ADL/IADL/leisure activities to improve functional cognition used in functional daily  routines.    Pt will complete functional transfers on and off all surfaces used in daily routines with Mod I for safety to maximize functional/occupational performance.    Pt will complete all bed mobility tasks with Mod I to serve as a prerequisite for EOB/OOB ADL/IADL/leisure tasks, optimize positioning/comfort, and increase functional independence.    Pt will independently demonstrate good carryover of safety precautions and education/training during ADL/IADL/leisure tasks with energy conservation techniques s/p skilled instruction without verbal cues.    Pt will complete UB ADL tasks with Mod I using AE/DME prn to increase functional independence in ADL/IADL/leisure tasks.    Pt will complete LB ADL tasks with Mod I using AE/DME prn to increase functional independence in ADL/IADL/leisure tasks.     Pt will complete toileting tasks with Mod I and good hygiene/thoroughness using AE/DME prn to increase functional independence.    Pt will independently identify and utilize 2-3 positive coping strategies to enhance overall wellbeing and engagement in valued occupations.    Gita Ambrose, MS, OTR/L

## 2024-12-17 NOTE — PLAN OF CARE
Problem: PAIN - ADULT  Goal: Verbalizes/displays adequate comfort level or baseline comfort level  Description: Interventions:  - Encourage patient to monitor pain and request assistance  - Assess pain using appropriate pain scale  - Administer analgesics based on type and severity of pain and evaluate response  - Implement non-pharmacological measures as appropriate and evaluate response  - Consider cultural and social influences on pain and pain management  - Notify physician/advanced practitioner if interventions unsuccessful or patient reports new pain  Outcome: Progressing     Problem: INFECTION - ADULT  Goal: Absence or prevention of progression during hospitalization  Description: INTERVENTIONS:  - Assess and monitor for signs and symptoms of infection  - Monitor lab/diagnostic results  - Monitor all insertion sites, i.e. indwelling lines, tubes, and drains  - Monitor endotracheal if appropriate and nasal secretions for changes in amount and color  - [place] appropriate cooling/warming therapies per order  - Administer medications as ordered  - Instruct and encourage patient and family to use good hand hygiene technique  - Identify and instruct in appropriate isolation precautions for identified infection/condition  Outcome: Progressing  Goal: Absence of fever/infection during neutropenic period  Description: INTERVENTIONS:  - Monitor WBC    Outcome: Progressing     Problem: SAFETY ADULT  Goal: Patient will remain free of falls  Description: INTERVENTIONS:  - Educate patient/family on patient safety including physical limitations  - Instruct patient to call for assistance with activity   - Consult OT/PT to assist with strengthening/mobility   - Keep Call bell within reach  - Keep bed low and locked with side rails adjusted as appropriate  - Keep care items and personal belongings within reach  - Initiate and maintain comfort rounds  - Make Fall Risk Sign visible to staff  - Offer Toileting every  Hours, in  advance of need  - Initiate/Maintain alarm  - Obtain necessary fall risk management equipment:   - Apply yellow socks and bracelet for high fall risk patients  - Consider moving patient to room near nurses station  Outcome: Progressing  Goal: Maintain or return to baseline ADL function  Description: INTERVENTIONS:  -  Assess patient's ability to carry out ADLs; assess patient's baseline for ADL function and identify physical deficits which impact ability to perform ADLs (bathing, care of mouth/teeth, toileting, grooming, dressing, etc.)  - Assess/evaluate cause of self-care deficits   - Assess range of motion  - Assess patient's mobility; develop plan if impaired  - Assess patient's need for assistive devices and provide as appropriate  - Encourage maximum independence but intervene and supervise when necessary  - Involve family in performance of ADLs  - Assess for home care needs following discharge   - Consider OT consult to assist with ADL evaluation and planning for discharge  - Provide patient education as appropriate  Outcome: Progressing  Goal: Maintains/Returns to pre admission functional level  Description: INTERVENTIONS:  - Perform AM-PAC 6 Click Basic Mobility/ Daily Activity assessment daily.  - Set and communicate daily mobility goal to care team and patient/family/caregiver.   - Collaborate with rehabilitation services on mobility goals if consulted  - Perform Range of Motion  times a day.  - Reposition patient every  hours.  - Dangle patient  times a day  - Stand patient  times a day  - Ambulate patient  times a day  - Out of bed to chair  times a day   - Out of bed for meals times a day  - Out of bed for toileting  - Record patient progress and toleration of activity level   Outcome: Progressing     Problem: DISCHARGE PLANNING  Goal: Discharge to home or other facility with appropriate resources  Description: INTERVENTIONS:  - Identify barriers to discharge w/patient and caregiver  - Arrange for  needed discharge resources and transportation as appropriate  - Identify discharge learning needs (meds, wound care, etc.)  - Arrange for interpretive services to assist at discharge as needed  - Refer to Case Management Department for coordinating discharge planning if the patient needs post-hospital services based on physician/advanced practitioner order or complex needs related to functional status, cognitive ability, or social support system  Outcome: Progressing     Problem: Knowledge Deficit  Goal: Patient/family/caregiver demonstrates understanding of disease process, treatment plan, medications, and discharge instructions  Description: Complete learning assessment and assess knowledge base.  Interventions:  - Provide teaching at level of understanding  - Provide teaching via preferred learning methods  Outcome: Progressing     Problem: Prexisting or High Potential for Compromised Skin Integrity  Goal: Skin integrity is maintained or improved  Description: INTERVENTIONS:  - Identify patients at risk for skin breakdown  - Assess and monitor skin integrity  - Assess and monitor nutrition and hydration status  - Monitor labs   - Assess for incontinence   - Turn and reposition patient  - Assist with mobility/ambulation  - Relieve pressure over bony prominences  - Avoid friction and shearing  - Provide appropriate hygiene as needed including keeping skin clean and dry  - Evaluate need for skin moisturizer/barrier cream  - Collaborate with interdisciplinary team   - Patient/family teaching  - Consider wound care consult   Outcome: Progressing

## 2024-12-17 NOTE — OP NOTE
OPERATIVE REPORT  PATIENT NAME: Anusha Mchugh    :  1955  MRN: [id number]  Pt Location:  OR ROOM 18    SURGERY DATE: 2024    Surgeons and Role:  Panel 1:     * Briseyda Echols MD - Primary     * Deja Whyte MD - Assisting  Panel 2:     * Oscar Borjas MD - Primary    Preop Diagnosis:  CSF otorrhea [G96.01]    Post-Op Diagnosis Codes:     * CSF otorrhea [G96.01]    Procedure(s):  Right - CRANIOTOMY MIDDLE FOSSA.APPROACH   Right - Right endoscopic middle fossa craniotomy for repair of tegmen defect and CSF leak with temporalis fascia or muscle flap. with neuromonitoring (CN ) and intraoperative lumbar drain placement    Specimen(s):  * No specimens in log * none    Estimated Blood Loss:   50 mL    Drains:  Lumbar Drain (Active)   Drain Status Capped 24   Drain Level (cm) 0 cm 24   CSF Color Clear 24   Site Description Unable to view 24   Dressing Status Clean;Intact;Dry 24   Output (mL) 10 mL 24 0600   Number of days: 1       [REMOVED] Urethral Catheter Latex 16 Fr. (Removed)   Site Assessment Clean;Skin intact 24   Collection Container Standard drainage bag 24   Securement Method Securing device (Describe) 24   Output (mL) 150 mL 24 1624   Number of days: 0       Anesthesia Type:   General    Operative Indications:  CSF otorrhea [G96.01]  Tegmen defect  Temporal encephalocele    Operative Findings:  Large tegmen dehiscence over the epitympanum and mastoid  Thinning of dura with multiple dehiscences  Temporal encephalocele    Complications:   None    Procedure and Technique:  The patient was brought into the operating room and placed supine on the OR table. Following the induction of general anesthesia, an endotracheal tube was placed by the anesthesia staff. The bed was turned 180 degrees. A lumbar drain was placed by the neurosurgery service. Details of this portion of the procedure will be  included in their separate operative note. The patient was then placed on a horseshoe head michael.     A 4 cm lazy-S shaped incision was marked out above the right ear and injected with 1% lidocaine, 1:100,000 epinephrine. ABR monitoring and a facial nerve monitor and  was placed on the face by the Neurophysiology Monitoring Service and was found to be functional. The patient was then prepped and draped in sterile fashion.    Working in tandem with the neurosurgery service, the marked incision was made through the skin, and the skin flap was elevated superiorly and inferior. A 6 x 2 cm anteriorly pedicled temporalis muscle rotation flap was harvested and secured anteriorly for later use.      A preethi hole craniotomy was then performed. A preethi hole was first drilled, and subsequently the craniectomy was widened using the drill and ronguers, yielding a final craniectomy size of 3 x 2.5 cm. The mastoid cavity was also partially entered. All of the bone dust and bone chips from this portion of the procedure were saved for later use.     The dura was then elevated from the floor of the middle fossa. As the dissection proceeded more medially, visualization was provided using the 0-degree and 30-degree endoscopes. During this portion of the procedure, the following landmarks were identified and preserved: the greater superficial petrosal nerve, the arcuate eminence. The following findings were identified: there was a large dehiscence of the tegmen into the epitympanum and superior mastoid cavity. The heads of the incus and malleus could be visualized through the dehiscence. There was thinning of the dura with multiple dehiscences of the dura and CSF flow. There was a temporal encephalocele corresponding to the area of the tegmen dehiscence. The area was thoroughly irrigated with sterile saline.    A piece of Duragen was trimmed and put over the dura overlying the tegmen. A second piece of Duragen was placed over the large  defect in the tegmen. The previously harvested bone dust and bone chips were then used to cover the defect in the tegmen. The temporalis muscle flap was then rotated into the defect overlying the tegmen. DuraSeal was then applied to all layers of the repair.    A cranioplasty was then performed. A titanium EVD cover was placed over the defect, insuring that no pressure was placed on the temporalis muscle rotation flap pedicle. This was secured with 2 self-tapping, self-drilling titanium screws.    The skin incision was closed in layers, with the superficial skin layer closed with a running 4-0 Monocryl and coated with a layer of Dermabond.     She was then awoken from general anesthesia and taken to the Neurosurgical Intensive Care Unit in stable condition. Her postoperative facial function was I/VI on the House-Brackmann scale bilaterally.     The patient tolerated this procedure well without complications.     Dr. Echols was present throughout this procedure and performed all key portions     I was present for the entire procedure.    Patient Disposition:  PACU  and extubated and stable             SIGNATURE: Briseyda Echols MD  DATE: December 17, 2024  TIME: 8:17 AM

## 2024-12-17 NOTE — ANESTHESIA POSTPROCEDURE EVALUATION
Post-Op Assessment Note    CV Status:  Stable  Pain Score: 0    Pain management: adequate    Multimodal analgesia used between 6 hours prior to anesthesia start to PACU discharge    Mental Status:  Alert and awake   Hydration Status:  Euvolemic and stable   PONV Controlled:  Controlled   Airway Patency:  Patent and adequate     Post Op Vitals Reviewed: Yes    No anethesia notable event occurred.    Staff: CRNA, Anesthesiologist           Last Filed PACU Vitals:  Vitals Value Taken Time   Temp 98.6 °F (37 °C) 12/16/24 1414   Pulse 114 12/16/24 1416   /71 12/16/24 1415   Resp 20 12/16/24 1416   SpO2 96 % 12/16/24 1416   Vitals shown include unfiled device data.    Modified Zaida:  Activity: 2 (12/16/2024  2:45 PM)  Respiration: 2 (12/16/2024  2:45 PM)  Circulation: 2 (12/16/2024  2:45 PM)  Consciousness: 2 (12/16/2024  2:45 PM)  Oxygen Saturation: 2 (12/16/2024  2:45 PM)  Modified Zaida Score: 10 (12/16/2024  2:45 PM)

## 2024-12-17 NOTE — ASSESSMENT & PLAN NOTE
POD 1 CRANIOTOMY MIDDLE FOSSA,RETROMASTOID APPROACH FOR ENT  Right endoscopic middle fossa craniotomy for repair of tegmen defect and CSF leak with temporalis fascia or muscle flap, with neuromonitoring (CN 7/8) and intraoperative lumbar drain placement ([place] / [place], 12/16/24)    Imaging:  CT head w/o, 12/16/24: New right craniectomy and mastoidectomy. Fluid, foci of air and high attenuation material in the mastoid cavity compatible with surgical material. Soft tissue swelling.     Plan:  Continue to monitor neurological exam  STAT CT head w/o for decline in GCS greater than 2 points in 1 hour  Lumbar drain in place  10cc/hr, keep at level of insertion site  Ancef while drain in place  Tentative plan to clamp Wednesday, remove Thursday, discharge patient Friday  ENT following, appreciate recommendations  Slight drainage from ear, appears bloody  R otowick in place, keep for 3 days  No antibiotic drop to ear  NCC consulted for ICU management, appreciate recommendations  SBP < 160  Medical management for post op nausea, PRN zofran and reglan ordered  D/c gomez  Post op labs reviewed. Expected leukocytosis 2/2 steroids and post op state  Decadron x48h for vasogenic edema  PPI while on steroids  Keppra x7d for seizure ppx  PT/OT evaluation, ok to mobilize  DVT ppx: SCDs, hold pharm ppx today    Neurosurgery will continue to follow as primary team. Please call with questions or concerns.

## 2024-12-17 NOTE — UTILIZATION REVIEW
Initial Clinical Review    Elective Inpatient surgical procedure  Age/Sex: 69 y.o. female  Surgery Date: 12/16  Procedure: S/p Right - CRANIOTOMY MIDDLE FOSSA.APPROACH   Right - Right endoscopic middle fossa craniotomy for repair of tegmen defect and CSF leak with temporalis fascia or muscle flap. with neuromonitoring (CN 7/8) and intraoperative lumbar drain placement  Anesthesia: General    POD#1 Progress Note:   12/17  Intra-op drain 50 ml. Previous 12 hours drain 130  Tentative clamp of LD drain 12/18 evening, with potential removal Thursday.  SBP less than 160. Iv Decadron q6h. Iv antibiotics q8h. Iv Keppra Bid.   Iv fluids. Pain control.   24hr Events; Multiple episodes of vomiting, unable to take oral meds this morning.   On exam; right hearing loss status post surgery.  Prior to surgery patient could intermittently hear with right ear.     Per Neurosurgery; Lumbar drain in place. 10 cc/hr, keep at level of insertion site. Iv antibiotics while drain place.  Tentative plan to clamp Wednesday, remove Thursday  Slight drainage from ear, appears bloody  R otowick in place, keep for 3 days. PPI while on steroids. Keppra.     Admission Orders: Date/Time/Statement:   Admission Orders (From admission, onward)       Ordered        12/16/24 0819  Inpatient Admission  Once                          Orders Placed This Encounter   Procedures    Inpatient Admission     Standing Status:   Standing     Number of Occurrences:   1     Level of Care:   Critical Care [15]     Estimated length of stay:   Inpatient Only Surgery     Diet: Regular  Mobility: OOB to chair  DVT Prophylaxis: SCD    Medications/Pain Control:   Scheduled Medications:  acetaminophen, 975 mg, Oral, Q8H TD  cefazolin, 1,000 mg, Intravenous, Q8H  dexamethasone, 4 mg, Intravenous, Q6H TD  famotidine, 20 mg, Oral, BID  losartan, 100 mg, Oral, Daily   And  hydroCHLOROthiazide, 25 mg, Oral, Daily  levETIRAcetam, 500 mg, Intravenous, Q12H TD  pantoprazole, 40  mg, Oral, Early Morning  PARoxetine, 20 mg, Oral, Daily  polyethylene glycol, 17 g, Oral, Daily  potassium chloride, 20 mEq, Intravenous, Once  senna-docusate sodium, 1 tablet, Oral, HS  vancomycin (VANCOCIN) 1,000 mg in sodium chloride 0.9 % 1,000 mL irrigation, 1,000 mg, Irrigation, Once      Continuous IV Infusions:  lactated ringers, 125 mL/hr, Intravenous, Continuous      PRN Meds:  albuterol, 2 puff, Inhalation, Q6H PRN  calcium carbonate, 1,000 mg, Oral, Daily PRN  HYDROmorphone, 0.2 mg, Intravenous, Q2H PRN  loratadine, 10 mg, Oral, Daily PRN  metoclopramide, 10 mg, Intravenous, Q6H PRN  ondansetron, 4 mg, Intravenous, Q6H PRN  oxyCODONE, 2.5 mg, Oral, Q4H PRN   Or  oxyCODONE, 5 mg, Oral, Q4H PRN      Vital Signs (last 3 days)       Date/Time Temp Pulse Resp BP MAP (mmHg) Arterial Line BP MAP SpO2 Calculated FIO2 (%) - Nasal Cannula O2 Flow Rate (L/min) Nasal Cannula O2 Flow Rate (L/min) O2 Device Patient Position - Orthostatic VS Nazario Coma Scale Score Pain    12/17/24 0935 -- -- -- -- -- -- -- -- -- -- -- -- -- -- 7    12/17/24 0800 98.1 °F (36.7 °C) -- -- -- -- -- -- -- -- -- -- -- -- -- --    12/17/24 0600 -- 84 22 -- -- 114/58 80 mmHg 97 % -- -- -- -- -- 15 --    12/17/24 0517 -- -- -- -- -- -- -- -- -- -- -- -- -- -- 6    12/17/24 0500 -- 92 24 -- -- 136/64 90 mmHg 98 % -- -- -- -- -- 15 --    12/17/24 0400 98.4 °F (36.9 °C) 80 19 -- -- 130/62 86 mmHg 98 % 28 -- 2 L/min Nasal cannula -- 15 4    12/17/24 0300 -- 100 22 -- -- 124/74 88 mmHg 98 % -- -- -- -- -- 15 --    12/17/24 0200 -- 82 23 -- -- 140/62 88 mmHg 97 % -- -- -- -- -- 15 --    12/17/24 0100 -- 76 19 -- -- 144/78 102 mmHg 97 % -- -- -- -- -- 15 --    12/17/24 0000 98.5 °F (36.9 °C) 80 21 -- -- 130/68 92 mmHg 97 % 28 -- 2 L/min Nasal cannula -- 15 4    12/16/24 2300 -- 82 22 -- -- 132/68 94 mmHg -- -- -- -- -- -- 15 --    12/16/24 2200 -- 80 20 -- -- 124/68 92 mmHg 97 % -- -- -- -- -- 15 --    12/16/24 2100 -- 80 21 -- -- 118/64 88 mmHg 96  % -- -- -- -- -- 15 --    12/16/24 2043 -- -- -- -- -- -- -- -- -- -- -- -- -- -- 7    12/16/24 2000 98.4 °F (36.9 °C) 88 20 -- -- 124/68 92 mmHg 98 % 28 -- 2 L/min Nasal cannula -- 15 5    12/16/24 1900 -- 84 20 -- -- 126/70 96 mmHg 100 % 28 -- 2 L/min Nasal cannula -- 15 --    12/16/24 1800 -- 80 20 -- -- 138/68 98 mmHg 99 % -- -- -- -- -- 15 --    12/16/24 1755 -- 84 24 150/89 128 144/72 102 mmHg 100 % -- -- -- -- Lying -- --    12/16/24 1743 -- 86 21 -- -- 136/74 100 mmHg 99 % 32 -- 3 L/min Nasal cannula -- -- --    12/16/24 1700 -- 86 18 -- -- 130/66 92 mmHg 99 % -- -- -- -- -- -- --    12/16/24 1659 -- -- -- -- -- -- -- -- -- -- -- -- -- -- 6 12/16/24 1634 -- -- -- -- -- -- -- -- -- -- -- -- -- 15 6    12/16/24 1626 97.4 °F (36.3 °C) 88 17 -- -- 138/76 100 mmHg 99 % -- -- -- -- -- -- --    12/16/24 1600 -- 88 24 -- -- 122/70 92 mmHg 99 % -- -- -- Nasal cannula -- -- --    12/16/24 1545 97.7 °F (36.5 °C) 91 19 -- -- 124/66 88 mmHg 97 % 36 -- 4 L/min Nasal cannula -- 15 3    12/16/24 1530 -- 89 21 124/68 90 128/63 88 mmHg 96 % 36 -- 4 L/min Nasal cannula -- -- 3    12/16/24 1515 -- -- -- -- -- -- -- 95 % 36 -- 4 L/min Nasal cannula -- -- 5    12/16/24 1445 97.7 °F (36.5 °C) 97 21 133/67 92 131/70 92 mmHg 95 % 36 -- 4 L/min Nasal cannula -- 15 --    12/16/24 1434 -- -- -- -- -- -- -- -- -- -- -- -- -- -- 5    12/16/24 1430 -- 97 20 126/63 88 122/59 79 mmHg 94 % 36 -- 4 L/min Nasal cannula -- -- 5    12/16/24 1415 -- 117 21 134/71 97 136/60 84 mmHg 96 % 36 -- 4 L/min Nasal cannula -- 15 No Pain    12/16/24 1414 98.6 °F (37 °C) 118 17 134/71 97 142/65 90 mmHg 96 % -- 6 L/min -- Simple mask -- -- --    12/16/24 0739 96.8 °F (36 °C) 88 20 144/88 -- -- -- 96 % -- -- -- None (Room air) -- -- No Pain          Weight (last 2 days)       Date/Time Weight    12/16/24 0739 99.8 (220)            Pertinent Labs/Diagnostic Test Results:   Radiology:  CT head wo contrast   Final Interpretation by Michelle Cotter MD (12/16  "1601)      Expected postoperative changes described above.                     Workstation performed: [id number]           Cardiology:  No orders to display     GI:  No orders to display           Results from last 7 days   Lab Units 12/17/24  0514 12/16/24  1248   WBC Thousand/uL 17.82*  --    HEMOGLOBIN g/dL 12.3  --    I STAT HEMOGLOBIN g/dl  --  12.2   HEMATOCRIT % 37.4  --    HEMATOCRIT, ISTAT %  --  36   PLATELETS Thousands/uL 225  --          Results from last 7 days   Lab Units 12/17/24  0514 12/16/24  1816 12/16/24  1248   SODIUM mmol/L 138  --   --    POTASSIUM mmol/L 2.9*  --   --    CHLORIDE mmol/L 102  --   --    CO2 mmol/L 25  --   --    CO2, I-STAT mmol/L  --   --  22   ANION GAP mmol/L 11  --   --    BUN mg/dL 9  --   --    CREATININE mg/dL 0.44*  --   --    EGFR ml/min/1.73sq m 104  --   --    CALCIUM mg/dL 8.7  --   --    CALCIUM, IONIZED, ISTAT mmol/L  --   --  1.12   MAGNESIUM mg/dL  --  1.5*  --    PHOSPHORUS mg/dL  --  2.6  --          Results from last 7 days   Lab Units 12/16/24  1417   POC GLUCOSE mg/dl 167*     Results from last 7 days   Lab Units 12/17/24  0514   GLUCOSE RANDOM mg/dL 163*             No results found for: \"BETA-HYDROXYBUTYRATE\"           Results from last 7 days   Lab Units 12/16/24  1248   I STAT BASE EXC mmol/L -6*   I STAT O2 SAT % 99*   ISTAT PH ART  7.294*   I STAT ART PCO2 mm HG 42.1   I STAT ART PO2 mm .0*   I STAT ART HCO3 mmol/L 20.4*                 Results from last 7 days   Lab Units 12/17/24  0514   PROTIME seconds 13.3   INR  0.98   PTT seconds 24       Results from last 7 days   Lab Units 12/17/24  0825   UNIT PRODUCT CODE  [id number]  [id number]  [id number]  [id number]   UNIT NUMBER  [id number]  [id number]  [id number]  [id number]   UNITABO  O  O  O  O   UNITRH  NEG  NEG  NEG  NEG   CROSSMATCH  Compatible  Compatible  Compatible  Compatible   UNIT DISPENSE STATUS  Return to Inv  Return to Inv  Return to Inv  Return to Inv "   UNIT PRODUCT VOL mL 350  350  350  350         Network Utilization Review Department  ATTENTION: Please call with any questions or concerns to [phone] and carefully listen to the prompts so that you are directed to the right person. All voicemails are confidential.   For Discharge needs, contact Care Management DC Support Team at [phone] opt. [phone]  Send all requests for admission clinical reviews, approved or denied determinations and any other requests to dedicated fax number below belonging to the campus where the patient is receiving treatment. List of dedicated fax numbers for the Facilities:  FACILITY NAME UR FAX NUMBER   ADMISSION DENIALS (Administrative/Medical Necessity) [phone]   DISCHARGE SUPPORT TEAM (NETWORK) [phone]   PARENT CHILD HEALTH (Maternity/NICU/Pediatrics) [phone]   [facility] [phone]   [facility] [phone]   Critical access hospital [phone]   [facility] [phone]   [facility] [phone]   [facility] [phone]   [facility] [phone]   [facility] [phone]   [facility] [phone]   [facility] [phone]   [facility] [phone]   [facility] [phone]

## 2024-12-17 NOTE — PROGRESS NOTES
Progress Note - Critical Care/ICU   Name: Anusha Mchugh 69 y.o. female I MRN: [id number]  Unit/Bed#: ICU 07 I Date of Admission: 12/16/2024   Date of Service: 12/17/2024 I Hospital Day: 1       Assessment & Plan   Neuro:   Postop day 1 status post lumbar drain placement, right endoscopic middle fossa craniectomy for repair of CSF leak antigen defect  Neurologically stable  Headache, controlled with p.o. Tylenol, 0.2 mg IV Dilaudid  12/16 -CT head moderate pneumocephalus  Plan  SBP less than 160  Pain control-Tylenol, oxycodone, hydromorphone  Decadron 48 hours for expected temporal lobe edema  Keppra 1 week for seizure prophylaxis  No additional imaging required unless clinically indicated  LD CSF drainage  Intra-op drain 50cc  Previous 12 hours drain 130  Tentative clamp of LD drain 12/18 evening, with potential removal Thursday.  Ancef, vancomycin  Decreased hearing R ear  History of depression  Home paroxetine 20 mg p.o.    CV:   Chronic HTN  Plan: HCTZ 25mg PO, losartan 100mg PO      Pulm:  Mild intermittent asthma  Home albuterol 2 puffs every 6 as needed  Loratadine 10 mg  LOUISE  Hold CPAP in setting of recent craniotomy     GI:   Postoperative nausea and vomiting  As needed Zofran  As needed Reglan  GERD  Plan: Protonix 40 mg, Pepcid 20 mg, Tums     :   No active issues     F/E/N:   Fluids: LR 125ml/h, will continue due to patient's continued episodes of vomiting  Electrolytes: Replete as needed; K+ >4, Phos >3, Mg >2.  Potassium today 2.9, Phos 2.6, mag 1.5.  Will obtain new labs  Nutrition: Regular diet, bowel regimen in place-senna docusate     Heme/Onc:   WBC 17.8, likely reactive in setting of recent surgery.  Afebrile, no cough will monitor for infection.     Endo:   No active issues     ID:   No active issues     MSK/Skin:   No active issues  Disposition: Critical care    ICU Core Measures     A: Assess, Prevent, and Manage Pain Has pain been assessed? Yes  Need for changes to pain regimen? No   B:  Both SAT/SAT  N/A   C: Choice of Sedation RASS Goal: 0 Alert and Calm or N/A patient not on sedation  Need for changes to sedation or analgesia regimen? NA   D: Delirium CAM-ICU: Negative   E: Early Mobility  Plan for early mobility? Yes   F: Family Engagement Plan for family engagement today? Yes       Antibiotic Review: Patient on appropriate coverage based on culture data.     Review of Invasive Devices:        Elida Plan: Keep arterial line for hemodynamic monitoring    Prophylaxis:  VTE Contraindicated secondary to: Recent surgery   Stress Ulcer  covered byfamotidine (PEPCID) 20 mg tablet [225303078] (Long-Term Med), famotidine (PEPCID) tablet 20 mg [[id number]], pantoprazole (PROTONIX) EC tablet 40 mg [[id number]]         24 Hour Events : Multiple episodes of vomiting, unable to take oral meds this morning.  Subjective   Review of Systems: See HPI for Review of Systems    Objective :                   Vitals I/O      Most Recent Min/Max in 24hrs   Temp 98.4 °F (36.9 °C) Temp  Min: 96.8 °F (36 °C)  Max: 98.6 °F (37 °C)   Pulse 84 Pulse  Min: 76  Max: 118   Resp 22 Resp  Min: 17  Max: 24   /89 BP  Min: 124/68  Max: 150/89   O2 Sat 97 % SpO2  Min: 94 %  Max: 100 %      Intake/Output Summary (Last 24 hours) at 12/17/2024 0643  Last data filed at 12/17/2024 0600  Gross per 24 hour   Intake 4641 ml   Output 3857 ml   Net 784 ml       Diet Regular; Regular House    Invasive Monitoring   Arterial Line  [place] /58  Arterial Line BP  Min: 114/58  Max: 144/78   MAP 80 mmHg  Arterial Line MAP (mmHg)  Min: 79 mmHg  Max: 102 mmHg           Physical Exam   Physical Exam  Vitals and nursing note reviewed.   Eyes:      General: No visual field deficit.     Extraocular Movements: Extraocular movements intact.      Right eye: Normal extraocular motion and no nystagmus.      Left eye: Normal extraocular motion and no nystagmus.      Pupils: Pupils are equal.   Skin:     General: Skin is warm and dry.   HENT:      Head:  Normocephalic and atraumatic. No right periorbital erythema or left periorbital erythema.      Mouth/Throat:      Mouth: Mucous membranes are moist.   Cardiovascular:      Rate and Rhythm: Normal rate and regular rhythm.      Pulses: No decreased pulses.           Carotid pulses are 2+ on the right side and 2+ on the left side.       Posterior tibial pulses are 2+ on the right side and 2+ on the left side.      Heart sounds: Normal heart sounds.   Musculoskeletal:      Right lower leg: No edema.      Left lower leg: No edema.   Abdominal:      Palpations: Abdomen is soft.      Tenderness: There is no abdominal tenderness.   Constitutional:       General: She is not in acute distress.     Appearance: She is not ill-appearing.      Interventions: She is not sedated and not intubated.  Pulmonary:      Effort: No tachypnea, bradypnea, accessory muscle usage, respiratory distress, retractions or accessory muscle usage. She is not intubated.      Breath sounds: No stridor or decreased air movement. No decreased breath sounds, wheezing, rhonchi or rales.   Neurological:      Mental Status: She is alert.      Sensory: No sensory deficit.      Motor: No weakness or motor deficit.      Comments: Cranial nerves II through XII intact, with exception of right hearing loss status post surgery.  Prior to surgery patient could intermittently hear with right ear.          Diagnostic Studies        Lab Results: I have reviewed the following results:     Medications:  Scheduled PRN   acetaminophen, 975 mg, Q8H TD  cefazolin, 1,000 mg, Q8H  dexamethasone, 4 mg, Q6H TD  famotidine, 20 mg, BID  losartan, 100 mg, Daily   And  hydroCHLOROthiazide, 25 mg, Daily  levETIRAcetam, 500 mg, Q12H TD  magnesium sulfate, 2 g, Once  pantoprazole, 40 mg, Early Morning  PARoxetine, 20 mg, Daily  polyethylene glycol, 17 g, Daily  potassium chloride, 20 mEq, Once  potassium-sodium phosphates, 2 packet, Once  senna-docusate sodium, 1 tablet,  HS  vancomycin (VANCOCIN) 1,000 mg in sodium chloride 0.9 % 1,000 mL irrigation, 1,000 mg, Once      albuterol, 2 puff, Q6H PRN  calcium carbonate, 1,000 mg, Daily PRN  HYDROmorphone, 0.2 mg, Q2H PRN  loratadine, 10 mg, Daily PRN  metoclopramide, 10 mg, Q6H PRN  ondansetron, 4 mg, Q6H PRN  oxyCODONE, 2.5 mg, Q4H PRN   Or  oxyCODONE, 5 mg, Q4H PRN       Continuous    lactated ringers, 125 mL/hr, Last Rate: 125 mL/hr (12/17/24 0136)         Labs:   CBC    Recent Labs     12/16/24  1248 12/17/24  0514   WBC  --  17.82*   HGB 12.2 12.3   HCT 36 37.4   PLT  --  225     BMP    Recent Labs     12/16/24  1248 12/17/24  0514   SODIUM  --  138   K  --  2.9*   CL  --  102   CO2 22 25   AGAP  --  11   BUN  --  9   CREATININE  --  0.44*   CALCIUM  --  8.7       Coags    Recent Labs     12/17/24  0514   INR 0.98   PTT 24        Additional Electrolytes  Recent Labs     12/16/24  1248 12/16/24  1816   MG  --  1.5*   PHOS  --  2.6   CAIONIZED 1.12  --           Blood Gas    No recent results  No recent results LFTs  No recent results    Infectious  No recent results  Glucose  Recent Labs     12/17/24  0514   GLUC 163*

## 2024-12-17 NOTE — PROGRESS NOTES
"OTOLARYNGOLOGY PROGRESS NOTE    Date of Service: 12/17/2024 5:47 AM    HPI  Patient is a 69 y.o. 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    Overnight Events: NAEO. AVSS. Denies room spinning, but has dizziness when she sits up. Has been nauseous and vomitting overnight. Has headaches. Right hearing muffled.    WBC 17.82  Hgb 12.3  INR 0.98    UOP 1.5 cc/kg/hr      PHYSICAL EXAM:  Vitals:    12/17/24 0500   BP:    Pulse: 92   Resp: (!) 24   Temp:    SpO2: 98%        General: No acute distress, AOx4  HEENT: R ear incision c/d/I, minimal dried blood around R EAC. Otowick in place, EOMi  Facial nerve intact in all branches.   Neurology: No focal deficits, nystagmus bilaterally  Lumbar drain open.  Lungs: Breathing easy, unlabored and even on room air  Cardio: RRR, well perfused      Intake/Output Summary (Last 24 hours) at 12/17/2024 0547  Last data filed at 12/17/2024 0400  Gross per 24 hour   Intake 4391 ml   Output 3735 ml   Net 656 ml         LABORATORY    Recent Labs     12/16/24  1248 12/17/24  0514   WBC  --  17.82*   HGB 12.2 12.3   HCT 36 37.4   PLT  --  225       Recent Labs     12/16/24  1816   PHOS 2.6   MG 1.5*       Invalid input(s): \"PTPAT\", \"PTINR\", \"APTTMNNM\", \"APTTPAT\"      Patient Active Problem List   Diagnosis    Primary hypertension    Abnormal mammogram of right breast    Allergies    Vitamin D deficiency    Anxiety and depression    Generalized arthritis    Mild intermittent asthma with acute exacerbation    Gastroesophageal reflux disease without esophagitis    History of bilateral knee arthroplasty    Sleep apnea treated with continuous positive airway pressure (CPAP)    Decreased hearing of right ear    " LOUISE (obstructive sleep apnea)    Prediabetes    Preop examination    CSF otorrhea         ASSESSMENT  Patient is a 69 y.o. female w/ acute and chronic problems as above, who presents POD 1 s/p endoscopic right middle fossa craniotomy for repair of tegmen defect and CSF leak with temporalis fascia or muscle flap, with neuromonitoring (CN 7/8) and intraoperative lumbar drain placement, doing well.    PLAN  - lumbar drain management per NSGY  - post-op CT head negative for subdural hematoma  - keep R otowick in place for 3 days  - PO or IV antibiotics while wick in place, no antibiotic drops to the ear    Please contact [facility] Resident Role for any further questions or concerns.  Deja Whyte MD

## 2024-12-18 LAB
ANION GAP SERPL CALCULATED.3IONS-SCNC: 7 MMOL/L (ref 4–13)
ATRIAL RATE: 76 BPM
BASOPHILS # BLD AUTO: 0.02 THOUSANDS/ÂΜL (ref 0–0.1)
BASOPHILS NFR BLD AUTO: 0 % (ref 0–1)
BUN SERPL-MCNC: 8 MG/DL (ref 5–25)
CALCIUM SERPL-MCNC: 8.8 MG/DL (ref 8.4–10.2)
CHLORIDE SERPL-SCNC: 104 MMOL/L (ref 96–108)
CO2 SERPL-SCNC: 27 MMOL/L (ref 21–32)
CREAT SERPL-MCNC: 0.39 MG/DL (ref 0.6–1.3)
EOSINOPHIL # BLD AUTO: 0.07 THOUSAND/ÂΜL (ref 0–0.61)
EOSINOPHIL NFR BLD AUTO: 0 % (ref 0–6)
ERYTHROCYTE [DISTWIDTH] IN BLOOD BY AUTOMATED COUNT: 13.4 % (ref 11.6–15.1)
GFR SERPL CREATININE-BSD FRML MDRD: 108 ML/MIN/1.73SQ M
GLUCOSE SERPL-MCNC: 152 MG/DL (ref 65–140)
HCT VFR BLD AUTO: 34.2 % (ref 34.8–46.1)
HGB BLD-MCNC: 11.5 G/DL (ref 11.5–15.4)
IMM GRANULOCYTES # BLD AUTO: 0.25 THOUSAND/UL (ref 0–0.2)
IMM GRANULOCYTES NFR BLD AUTO: 1 % (ref 0–2)
LYMPHOCYTES # BLD AUTO: 1.69 THOUSANDS/ÂΜL (ref 0.6–4.47)
LYMPHOCYTES NFR BLD AUTO: 8 % (ref 14–44)
MAGNESIUM SERPL-MCNC: 2.3 MG/DL (ref 1.9–2.7)
MCH RBC QN AUTO: 30.3 PG (ref 26.8–34.3)
MCHC RBC AUTO-ENTMCNC: 33.6 G/DL (ref 31.4–37.4)
MCV RBC AUTO: 90 FL (ref 82–98)
MONOCYTES # BLD AUTO: 2.13 THOUSAND/ÂΜL (ref 0.17–1.22)
MONOCYTES NFR BLD AUTO: 10 % (ref 4–12)
NEUTROPHILS # BLD AUTO: 16.76 THOUSANDS/ÂΜL (ref 1.85–7.62)
NEUTS SEG NFR BLD AUTO: 81 % (ref 43–75)
NRBC BLD AUTO-RTO: 0 /100 WBCS
P AXIS: 66 DEGREES
PHOSPHATE SERPL-MCNC: 1.9 MG/DL (ref 2.3–4.1)
PLATELET # BLD AUTO: 212 THOUSANDS/UL (ref 149–390)
PMV BLD AUTO: 9.3 FL (ref 8.9–12.7)
POTASSIUM SERPL-SCNC: 3.6 MMOL/L (ref 3.5–5.3)
PR INTERVAL: 150 MS
QRS AXIS: 36 DEGREES
QRSD INTERVAL: 79 MS
QT INTERVAL: 404 MS
QTC INTERVAL: 455 MS
RBC # BLD AUTO: 3.79 MILLION/UL (ref 3.81–5.12)
SODIUM SERPL-SCNC: 138 MMOL/L (ref 135–147)
T WAVE AXIS: 86 DEGREES
VENTRICULAR RATE: 76 BPM
WBC # BLD AUTO: 20.92 THOUSAND/UL (ref 4.31–10.16)

## 2024-12-18 PROCEDURE — 85025 COMPLETE CBC W/AUTO DIFF WBC: CPT

## 2024-12-18 PROCEDURE — 99233 SBSQ HOSP IP/OBS HIGH 50: CPT | Performed by: INTERNAL MEDICINE

## 2024-12-18 PROCEDURE — 93010 ELECTROCARDIOGRAM REPORT: CPT | Performed by: STUDENT IN AN ORGANIZED HEALTH CARE EDUCATION/TRAINING PROGRAM

## 2024-12-18 PROCEDURE — 84100 ASSAY OF PHOSPHORUS: CPT

## 2024-12-18 PROCEDURE — 93005 ELECTROCARDIOGRAM TRACING: CPT

## 2024-12-18 PROCEDURE — 80048 BASIC METABOLIC PNL TOTAL CA: CPT

## 2024-12-18 PROCEDURE — 83735 ASSAY OF MAGNESIUM: CPT

## 2024-12-18 PROCEDURE — 99024 POSTOP FOLLOW-UP VISIT: CPT | Performed by: NEUROLOGICAL SURGERY

## 2024-12-18 RX ORDER — PROMETHAZINE HYDROCHLORIDE 25 MG/ML
12.5 INJECTION, SOLUTION INTRAMUSCULAR; INTRAVENOUS EVERY 6 HOURS PRN
Status: DISCONTINUED | OUTPATIENT
Start: 2024-12-18 | End: 2024-12-21 | Stop reason: HOSPADM

## 2024-12-18 RX ORDER — POTASSIUM CHLORIDE 14.9 MG/ML
20 INJECTION INTRAVENOUS ONCE
Status: COMPLETED | OUTPATIENT
Start: 2024-12-18 | End: 2024-12-18

## 2024-12-18 RX ORDER — ACETAMINOPHEN 325 MG/1
975 TABLET ORAL EVERY 8 HOURS SCHEDULED
Status: DISCONTINUED | OUTPATIENT
Start: 2024-12-18 | End: 2024-12-18

## 2024-12-18 RX ORDER — ACETAMINOPHEN 10 MG/ML
1000 INJECTION, SOLUTION INTRAVENOUS EVERY 8 HOURS
Status: DISCONTINUED | OUTPATIENT
Start: 2024-12-18 | End: 2024-12-19

## 2024-12-18 RX ORDER — PROMETHAZINE HYDROCHLORIDE 25 MG/ML
12.5 INJECTION, SOLUTION INTRAMUSCULAR; INTRAVENOUS ONCE
Status: COMPLETED | OUTPATIENT
Start: 2024-12-18 | End: 2024-12-18

## 2024-12-18 RX ORDER — HEPARIN SODIUM 5000 [USP'U]/ML
5000 INJECTION, SOLUTION INTRAVENOUS; SUBCUTANEOUS EVERY 8 HOURS SCHEDULED
Status: DISPENSED | OUTPATIENT
Start: 2024-12-18 | End: 2024-12-20

## 2024-12-18 RX ADMIN — CEFAZOLIN SODIUM 1000 MG: 1 SOLUTION INTRAVENOUS at 08:24

## 2024-12-18 RX ADMIN — HEPARIN SODIUM 5000 UNITS: 5000 INJECTION, SOLUTION INTRAVENOUS; SUBCUTANEOUS at 11:19

## 2024-12-18 RX ADMIN — CEFAZOLIN SODIUM 1000 MG: 1 SOLUTION INTRAVENOUS at 00:07

## 2024-12-18 RX ADMIN — ACETAMINOPHEN 1000 MG: 10 INJECTION INTRAVENOUS at 12:33

## 2024-12-18 RX ADMIN — ACETAMINOPHEN 1000 MG: 10 INJECTION INTRAVENOUS at 04:00

## 2024-12-18 RX ADMIN — FAMOTIDINE 20 MG: 10 INJECTION, SOLUTION INTRAVENOUS at 20:41

## 2024-12-18 RX ADMIN — LEVETIRACETAM 500 MG: 100 INJECTION, SOLUTION INTRAVENOUS at 20:41

## 2024-12-18 RX ADMIN — SODIUM CHLORIDE, SODIUM LACTATE, POTASSIUM CHLORIDE, AND CALCIUM CHLORIDE 125 ML/HR: .6; .31; .03; .02 INJECTION, SOLUTION INTRAVENOUS at 02:15

## 2024-12-18 RX ADMIN — POLYETHYLENE GLYCOL 3350 17 G: 17 POWDER, FOR SOLUTION ORAL at 08:34

## 2024-12-18 RX ADMIN — HYDROMORPHONE HYDROCHLORIDE 0.2 MG: 0.2 INJECTION, SOLUTION INTRAMUSCULAR; INTRAVENOUS; SUBCUTANEOUS at 08:30

## 2024-12-18 RX ADMIN — PANTOPRAZOLE SODIUM 40 MG: 40 INJECTION, POWDER, FOR SOLUTION INTRAVENOUS at 08:24

## 2024-12-18 RX ADMIN — SODIUM CHLORIDE, SODIUM LACTATE, POTASSIUM CHLORIDE, AND CALCIUM CHLORIDE 125 ML/HR: .6; .31; .03; .02 INJECTION, SOLUTION INTRAVENOUS at 11:04

## 2024-12-18 RX ADMIN — SODIUM PHOSPHATE, MONOBASIC, MONOHYDRATE AND SODIUM PHOSPHATE, DIBASIC, ANHYDROUS 12 MMOL: 142; 276 INJECTION, SOLUTION INTRAVENOUS at 08:00

## 2024-12-18 RX ADMIN — POTASSIUM CHLORIDE 20 MEQ: 14.9 INJECTION, SOLUTION INTRAVENOUS at 09:42

## 2024-12-18 RX ADMIN — HYDROMORPHONE HYDROCHLORIDE 0.2 MG: 0.2 INJECTION, SOLUTION INTRAMUSCULAR; INTRAVENOUS; SUBCUTANEOUS at 17:16

## 2024-12-18 RX ADMIN — HYDROMORPHONE HYDROCHLORIDE 0.2 MG: 0.2 INJECTION, SOLUTION INTRAMUSCULAR; INTRAVENOUS; SUBCUTANEOUS at 05:20

## 2024-12-18 RX ADMIN — HYDROMORPHONE HYDROCHLORIDE 0.2 MG: 0.2 INJECTION, SOLUTION INTRAMUSCULAR; INTRAVENOUS; SUBCUTANEOUS at 01:04

## 2024-12-18 RX ADMIN — FAMOTIDINE 20 MG: 10 INJECTION, SOLUTION INTRAVENOUS at 08:34

## 2024-12-18 RX ADMIN — TRIMETHOBENZAMIDE HYDROCHLORIDE 200 MG: 100 INJECTION INTRAMUSCULAR at 09:05

## 2024-12-18 RX ADMIN — SODIUM PHOSPHATE, MONOBASIC, MONOHYDRATE AND SODIUM PHOSPHATE, DIBASIC, ANHYDROUS 30 MMOL: 142; 276 INJECTION, SOLUTION INTRAVENOUS at 11:22

## 2024-12-18 RX ADMIN — POTASSIUM CHLORIDE 20 MEQ: 14.9 INJECTION, SOLUTION INTRAVENOUS at 01:00

## 2024-12-18 RX ADMIN — HEPARIN SODIUM 5000 UNITS: 5000 INJECTION, SOLUTION INTRAVENOUS; SUBCUTANEOUS at 22:20

## 2024-12-18 RX ADMIN — HYDROMORPHONE HYDROCHLORIDE 0.2 MG: 0.2 INJECTION, SOLUTION INTRAMUSCULAR; INTRAVENOUS; SUBCUTANEOUS at 11:29

## 2024-12-18 RX ADMIN — SENNOSIDES AND DOCUSATE SODIUM 1 TABLET: 50; 8.6 TABLET ORAL at 22:20

## 2024-12-18 RX ADMIN — LEVETIRACETAM 500 MG: 100 INJECTION, SOLUTION INTRAVENOUS at 08:24

## 2024-12-18 RX ADMIN — HYDROMORPHONE HYDROCHLORIDE 0.2 MG: 0.2 INJECTION, SOLUTION INTRAMUSCULAR; INTRAVENOUS; SUBCUTANEOUS at 20:38

## 2024-12-18 RX ADMIN — PAROXETINE HYDROCHLORIDE 20 MG: 20 TABLET, FILM COATED ORAL at 08:39

## 2024-12-18 RX ADMIN — ACETAMINOPHEN 1000 MG: 10 INJECTION INTRAVENOUS at 20:20

## 2024-12-18 RX ADMIN — CEFAZOLIN SODIUM 1000 MG: 1 SOLUTION INTRAVENOUS at 17:16

## 2024-12-18 RX ADMIN — POTASSIUM CHLORIDE 20 MEQ: 14.9 INJECTION, SOLUTION INTRAVENOUS at 08:24

## 2024-12-18 RX ADMIN — PROMETHAZINE HYDROCHLORIDE 12.5 MG: 25 INJECTION INTRAMUSCULAR; INTRAVENOUS at 20:27

## 2024-12-18 RX ADMIN — PROMETHAZINE HYDROCHLORIDE 12.5 MG: 25 INJECTION INTRAMUSCULAR; INTRAVENOUS at 11:20

## 2024-12-18 NOTE — PROGRESS NOTES
Progress Note - Critical Care/ICU   Name: Anusha Mchugh 69 y.o. 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  Ancef while drain in place  Decreased hearing R ear  History of depression  Home paroxetine 20 mg p.o.     CV:   Chronic HTN  Holding home HCTZ 25mg PO, losartan 100mg PO      Pulm:  Mild intermittent asthma  Home albuterol 2 puffs every 6 as needed  Loratadine 10 mg  LOUISE  Hold CPAP in setting of recent craniotomy     GI:   Postoperative nausea and vomiting  200mg tigan q6 prn due to Qtc prolongation -improved  IV protonix 40mg per NSG  Will attempt transition to PO meds. Regular diet ordered.  GERD  Plan: Pepcid 20 mg, Tums     :   No active issues     F/E/N:   Fluids: LR 125ml/h, will continue due to patient's continued episodes of vomiting  Electrolytes: Replete as needed; K+ >4, Phos >3, Mg >2.    Nutrition: Regular diet, bowel regimen in place-senna docusate     Heme/Onc:   WBC 20.9, likely reactive in setting of recent surgery.  Afebrile, no cough will monitor for infection.     Endo:   No active issues     ID:   No active issues     MSK/Skin:   No active issues  Disposition: Critical care    ICU Core Measures     A: Assess, Prevent, and Manage Pain Has pain  been assessed? Yes  Need for changes to pain regimen? Yes   B: Both SAT/SAT  N/A   C: Choice of Sedation RASS Goal: 0 Alert and Calm or N/A patient not on sedation  Need for changes to sedation or analgesia regimen? NA   D: Delirium CAM-ICU: Negative   E: Early Mobility  Plan for early mobility? Yes   F: Family Engagement Plan for family engagement today? Yes       Antibiotic Review: Post op requirements     Review of Invasive Devices:        Elida Plan: Keep arterial line for hemodynamic monitoring    Prophylaxis:  VTE Contraindicated secondary to: Postop   Stress Ulcer  covered byFamotidine (PF) (PEPCID) injection 20 mg [[id number]], famotidine (PEPCID) 20 mg tablet [[id number]] (Long-Term Med), pantoprazole (PROTONIX) injection 40 mg [[id number]]         24 Hour Events : QTc prolongation overnight, switch from Phenergan/Reglan to Tigan.  Electrolyte repletion overnight.  Subjective   Review of Systems: See HPI for Review of Systems    Objective :                   Vitals I/O      Most Recent Min/Max in 24hrs   Temp 98.5 °F (36.9 °C) Temp  Min: 98 °F (36.7 °C)  Max: 98.9 °F (37.2 °C)   Pulse 72 Pulse  Min: 66  Max: 96   Resp 18 Resp  Min: 17  Max: 29   /68 BP  Min: 118/68  Max: 139/67   O2 Sat 97 % SpO2  Min: 96 %  Max: 98 %      Intake/Output Summary (Last 24 hours) at 12/18/2024 0611  Last data filed at 12/18/2024 0300  Gross per 24 hour   Intake 3863 ml   Output 1697.5 ml   Net 2165.5 ml       Diet Regular; Regular House    Invasive Monitoring   Arterial Line  Elida /64  Arterial Line BP  Min: 96/58  Max: 136/62   MAP 86 mmHg  Arterial Line MAP (mmHg)  Min: 70 mmHg  Max: 92 mmHg           Physical Exam   Physical Exam  Vitals and nursing note reviewed.   Eyes:      General: No visual field deficit.     Extraocular Movements: Extraocular movements intact.      Right eye: Normal extraocular motion and no nystagmus.      Left eye: Normal extraocular motion and no nystagmus.      Pupils: Pupils are equal.    Skin:     General: Skin is warm and dry.   HENT:      Head: Normocephalic and atraumatic. No right periorbital erythema or left periorbital erythema.      Comments: Surgical incision to the right ear, draining serosanguineous fluid, no purulence.  Bandage in place.     Mouth/Throat:      Mouth: Mucous membranes are moist.   Cardiovascular:      Rate and Rhythm: Normal rate and regular rhythm.      Pulses: No decreased pulses.           Carotid pulses are 2+ on the right side and 2+ on the left side.       Posterior tibial pulses are 2+ on the right side and 2+ on the left side.      Heart sounds: Normal heart sounds.   Musculoskeletal:      Right lower leg: No edema.      Left lower leg: No edema.      Comments: Left radial A-line.  Lumbar drain in place   Abdominal:      Palpations: Abdomen is soft.      Tenderness: There is no abdominal tenderness.   Constitutional:       General: She is not in acute distress.     Appearance: She is not ill-appearing.      Interventions: She is not sedated and not intubated.  Pulmonary:      Effort: No tachypnea, bradypnea, accessory muscle usage, respiratory distress, retractions or accessory muscle usage. She is not intubated.      Breath sounds: No stridor or decreased air movement. No decreased breath sounds, wheezing, rhonchi or rales.   Neurological:      Mental Status: She is alert.      Sensory: No sensory deficit.      Motor: No weakness.      Comments: Cranial nerves II through XII intact with exception of right muffled hearing.          Diagnostic Studies        Lab Results: I have reviewed the following results:     Medications:  Scheduled PRN   [Held by provider] acetaminophen, 975 mg, Q8H TD  cefazolin, 1,000 mg, Q8H  famotidine, 20 mg, Q12H TD  levETIRAcetam, 500 mg, Q12H TD  [Held by provider] losartan, 100 mg, Daily  pantoprazole, 40 mg, Q24H TD  PARoxetine, 20 mg, Daily  polyethylene glycol, 17 g, Daily  senna-docusate sodium, 1 tablet, HS      albuterol, 2  puff, Q6H PRN  calcium carbonate, 1,000 mg, Daily PRN  HYDROmorphone, 0.2 mg, Q2H PRN  loratadine, 10 mg, Daily PRN  oxyCODONE, 2.5 mg, Q4H PRN   Or  oxyCODONE, 5 mg, Q4H PRN  trimethobenzamide, 200 mg, Q6H PRN       Continuous    lactated ringers, 125 mL/hr, Last Rate: 125 mL/hr (12/18/24 0215)         Labs:   CBC    Recent Labs     12/17/24  0514 12/18/24  0504   WBC 17.82* 20.92*   HGB 12.3 11.5   HCT 37.4 34.2*    212     BMP    Recent Labs     12/17/24  2256 12/18/24  0504   SODIUM 135 138   K 3.5 3.6    104   CO2 25 27   AGAP 8 7   BUN 9 8   CREATININE 0.41* 0.39*   CALCIUM 8.3* 8.8       Coags    Recent Labs     12/17/24  0514   INR 0.98   PTT 24        Additional Electrolytes  Recent Labs     12/16/24  1248 12/16/24  1816 12/17/24  2256 12/18/24  0504   MG  --    < > 2.3 2.3   PHOS  --    < > 2.5 1.9*   CAIONIZED 1.12  --   --   --     < > = values in this interval not displayed.          Blood Gas    No recent results  No recent results LFTs  No recent results    Infectious  No recent results  Glucose  Recent Labs     12/17/24  0514 12/17/24  1402 12/17/24  2256 12/18/24  0504   GLUC 163* 175* 174* 152*

## 2024-12-18 NOTE — PROGRESS NOTES
Progress Note - Neurosurgery   Name: Anusha Mchugh 69 y.o. female I MRN: [id number]  Unit/Bed#: ICU 07 I Date of Admission: 12/16/2024   Date of Service: 12/18/2024 I Hospital Day: 2     Assessment & Plan  CSF otorrhea  POD 2 CRANIOTOMY MIDDLE FOSSA,RETROMASTOID APPROACH FOR ENT  Right endoscopic middle fossa craniotomy for repair of tegmen defect and CSF leak with temporalis fascia or muscle flap, with neuromonitoring (CN 7/8) and intraoperative lumbar drain placement (Indio / Kinza, 12/16/24)    Imaging:  CT head w/o, 12/16/24: New right craniectomy and mastoidectomy. Fluid, foci of air and high attenuation material in the mastoid cavity compatible with surgical material. Soft tissue swelling.     Plan:  Continue to monitor neurological exam  STAT CT head w/o for decline in GCS greater than 2 points in 1 hour  Lumbar drain in place  10cc/hr, keep at level of insertion site  Ancef while drain in place  Tentative plan to clamp Thursday, remove Friday, discharge patient Saturday  ENT following, appreciate recommendations  Slight drainage from ear, appears bloody  R otowick in place, keep for 3 days  No antibiotic drops to ear  NCC consulted for ICU management, appreciate recommendations  SBP < 160  Medical management for post op nausea, PRN zofran and reglan ordered. Added Tigan for continued post op nausea and vomiting  D/c gomez  Post op labs reviewed. Expected leukocytosis 2/2 steroids and post op state  Decadron x48h for vasogenic edema  PPI while on steroids  Keppra x7d for seizure ppx  PT/OT evaluation, ok to mobilize  DVT ppx: SCDs, SQH TID    Neurosurgery will continue to follow as primary team. Please call with questions or concerns.   Decreased hearing of right ear  Secondary to tegman defect, present on arrival  MORBID (SEVERE) OBESITY DUE TO EXCESS CALORIES  Recommend lifestyle modification         Subjective   Patient with persistent but slightly improved nausea overnight.  She did vomit x 1.  She  has been unable to tolerate p.o.  Therefore, she is still taking a significant amount of IV Dilaudid as she cannot swallow oxycodone.  Overall, she states she does feel slightly better today.  She is sitting up in the chair with a cold rag on her forehead.  Her lumbar drain is functioning well.  She continues to have a scant amount of drainage from her right ear.  Otherwise, no headache or other complaints/deficits.    Objective :  Temp:  [98 °F (36.7 °C)-98.9 °F (37.2 °C)] 98.6 °F (37 °C)  HR:  [62-88] 62  BP: (118-139)/(67-68) 130/68  Resp:  [16-29] 16  SpO2:  [96 %-99 %] 98 %    I/O         12/16 0701  12/17 0700 12/17 0701  12/18 0700 12/18 0701  12/19 0700    P.O. 30      I.V. (mL/kg) 4011 (40.2) 3260.9 (32.7)     IV Piggyback 600 1100     Total Intake(mL/kg) 4641 (46.5) 4360.9 (43.7)     Urine (mL/kg/hr) 3625 1887.5 (0.8)     Emesis/NG output 2      Drains 140 230     Other 50      Blood 50      Total Output 3867 2117.5     Net +774 +2243.4            Unmeasured Emesis Occurrence 1 x            Physical Exam Neurological Exam  General appearance: alert, appears stated age, cooperative and no distress  Head: Normocephalic, without obvious abnormality, atraumatic  Eyes: EOMI, PERRL  Neck: supple, symmetrical, trachea midline   Back: LD in place, functioning well  Lungs: non labored breathing  Heart: regular heart rate  Neurologic:   Mental status: Alert, oriented x3, thought content appropriate  Cranial nerves: grossly intact (Cranial nerves II-XII)  Sensory: normal to LT  Motor: moving all extremities without focal weakness       Lab Results: I have reviewed the following results:  Recent Labs     12/16/24  1248 12/16/24  1816 12/17/24  0514 12/17/24  1402 12/18/24  0504   WBC  --    < > 17.82*  --  20.92*   HGB 12.2   < > 12.3  --  11.5   HCT 36   < > 37.4  --  34.2*   PLT  --    < > 225  --  212   SODIUM  --   --  138   < > 138   K  --   --  2.9*   < > 3.6   CL  --   --  102   < > 104   CO2 22  --  25   < >  27   BUN  --   --  9   < > 8   CREATININE  --   --  0.44*   < > 0.39*   GLUC  --   --  163*   < > 152*   CAIONIZED 1.12  --   --   --   --    MG  --    < >  --    < > 2.3   PHOS  --    < >  --    < > 1.9*   PTT  --   --  24  --   --    INR  --   --  0.98  --   --     < > = values in this interval not displayed.       Imaging Results Review: No pertinent imaging studies reviewed.      VTE Pharmacologic Prophylaxis: Sequential compression device (Venodyne)  and Heparin

## 2024-12-18 NOTE — ASSESSMENT & PLAN NOTE
POD 2 CRANIOTOMY MIDDLE FOSSA,RETROMASTOID APPROACH FOR ENT  Right endoscopic middle fossa craniotomy for repair of tegmen defect and CSF leak with temporalis fascia or muscle flap, with neuromonitoring (CN 7/8) and intraoperative lumbar drain placement ([place]io / Kinza, 12/16/24)    Imaging:  CT head w/o, 12/16/24: New right craniectomy and mastoidectomy. Fluid, foci of air and high attenuation material in the mastoid cavity compatible with surgical material. Soft tissue swelling.     Plan:  Continue to monitor neurological exam  STAT CT head w/o for decline in GCS greater than 2 points in 1 hour  Lumbar drain in place  10cc/hr, keep at level of insertion site  Ancef while drain in place  Tentative plan to clamp Thursday, remove Friday, discharge patient Saturday  ENT following, appreciate recommendations  Slight drainage from ear, appears bloody  R otowick in place, keep for 3 days  No antibiotic drops to ear  NCC consulted for ICU management, appreciate recommendations  SBP < 160  Medical management for post op nausea, PRN zofran and reglan ordered. Added Tigan for continued post op nausea and vomiting  D/c gomez  Post op labs reviewed. Expected leukocytosis 2/2 steroids and post op state  Decadron x48h for vasogenic edema  PPI while on steroids  Keppra x7d for seizure ppx  PT/OT evaluation, ok to mobilize  DVT ppx: SCDs, SQH TID    Neurosurgery will continue to follow as primary team. Please call with questions or concerns.

## 2024-12-18 NOTE — CASE MANAGEMENT
Case Management Discharge Planning Note    Patient name Anusha Mchugh  Location ICU 07/ICU 07 MRN [id number]  : 1955 Date 2024       Current Admission Date: 2024  Current Admission Diagnosis:CSF otorrhea   Patient Active Problem List    Diagnosis Date Noted Date Diagnosed    MORBID (SEVERE) OBESITY DUE TO EXCESS CALORIES 2024     Prediabetes 2024     Preop examination 2024     CSF otorrhea 2024     LOUISE (obstructive sleep apnea) 2024     Sleep apnea treated with continuous positive airway pressure (CPAP) 2024     Decreased hearing of right ear 2024     Primary hypertension 2023     Abnormal mammogram of right breast 2023     Allergies 2023     Vitamin D deficiency 2023     Anxiety and depression 2023     Generalized arthritis 2023     Mild intermittent asthma with acute exacerbation 2023     Gastroesophageal reflux disease without esophagitis 2023     History of bilateral knee arthroplasty 2023       LOS (days): 2  Geometric Mean LOS (GMLOS) (days): 1.6  Days to GMLOS:-0.7     OBJECTIVE:  Risk of Unplanned Readmission Score: 8.34         Current admission status: Inpatient   Preferred Pharmacy:   [facility]/pharmacy #[id number] [place] [address]  Phone: [phone] Fax: [phone]    Primary Care Provider: Jjuu Mustafa MD    Primary Insurance: [employer] [facility]  Secondary Insurance:     DISCHARGE DETAILS:    Discharge planning discussed with:: nicolette Kennedy---explained role of , pt will need therapy evaluations when ready, will assist with proper level of rehab vs HHC.  Morse of Choice: Yes     CM contacted family/caregiver?: Yes             Contacts  Patient Contacts: daughter Gris Mchugh  Relationship to Patient:: Family  Contact Method: Phone  Phone Number: [phone]  Reason/Outcome: Continuity of Care,  Emergency Contact, Discharge Planning                                                                  Family notified:: daughter Marcia here  Additional Comments: POD#2 craniotomy for CSF otorrhea/leak defect. Has lumbar drain. On IV Decardon and IV Dilaudid, c/o headache and nausea. Spoke with daughter here, pt. sleeping.  CM following for discharge needs, HHC vs rehab.

## 2024-12-18 NOTE — PROGRESS NOTES
"OTOLARYNGOLOGY PROGRESS NOTE    Date of Service: 12/18/2024 6:31 AM    HPI  Patient is a 69 y.o. 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    Overnight Events: NAEO. AVSS. Improvement in headaches. Minimal dizziness. One episode of emesis overnight but overall feeling better.     WBC 20.92 (17.82)  Hgb 11.5 (12.3)    UOP 0.6 cc/kg/hr      PHYSICAL EXAM:  Vitals:    12/18/24 0600   BP:    Pulse: 72   Resp: 18   Temp:    SpO2: 97%        General: No acute distress, AOx4  HEENT: R ear incision c/d/I, minimal dried blood around R EAC. Otowick in place, EOMi  Facial nerve intact in all branches.   Neurology: No focal deficits, nystagmus bilaterally  Lumbar drain open.  Lungs: Breathing easy, unlabored and even on room air  Cardio: RRR, well perfused      Intake/Output Summary (Last 24 hours) at 12/18/2024 0631  Last data filed at 12/18/2024 0504  Gross per 24 hour   Intake 3863 ml   Output 2097.5 ml   Net 1765.5 ml         LABORATORY    Recent Labs     12/16/24  1248 12/17/24  0514 12/18/24  0504   WBC  --  17.82* 20.92*   HGB 12.2 12.3 11.5   HCT 36 37.4 34.2*   PLT  --  225 212       Recent Labs     12/16/24  1816 12/17/24  0514 12/17/24  1402 12/17/24  2256 12/18/24  0504   K  --  2.9* 3.3* 3.5 3.6   CL  --  102 101 102 104   BUN  --  9 9 9 8   PHOS 2.6  --  1.5* 2.5 1.9*   MG 1.5*  --  2.6 2.3 2.3       Invalid input(s): \"PTPAT\", \"PTINR\", \"APTTMNNM\", \"APTTPAT\"      Patient Active Problem List   Diagnosis    Primary hypertension    Abnormal mammogram of right breast    Allergies    Vitamin D deficiency    Anxiety and depression    Generalized arthritis    Mild intermittent asthma with acute exacerbation    Gastroesophageal reflux " disease without esophagitis    History of bilateral knee arthroplasty    Sleep apnea treated with continuous positive airway pressure (CPAP)    Decreased hearing of right ear    LOUISE (obstructive sleep apnea)    Prediabetes    Preop examination    CSF otorrhea    MORBID (SEVERE) OBESITY DUE TO EXCESS CALORIES         ASSESSMENT  Patient is a 69 y.o. female w/ acute and chronic problems as above, who presents POD 2 s/p endoscopic right middle fossa craniotomy for repair of tegmen defect and CSF leak with temporalis fascia or muscle flap, with neuromonitoring (CN 7/8) and intraoperative lumbar drain placement, doing well.    PLAN  - lumbar drain management per NSGY  - keep R otowick in place for 3 days  - PO or IV antibiotics while wick in place, no antibiotic drops to the ear  - rest of care per primary    Please contact [facility] Resident Role for any further questions or concerns.    Deja Whyte MD

## 2024-12-19 ENCOUNTER — HOME HEALTH ADMISSION (OUTPATIENT)
Dept: HOME HEALTH SERVICES | Facility: HOME HEALTHCARE | Age: 69
End: 2024-12-19
Payer: COMMERCIAL

## 2024-12-19 LAB
ANION GAP SERPL CALCULATED.3IONS-SCNC: 9 MMOL/L (ref 4–13)
BASOPHILS # BLD AUTO: 0.02 THOUSANDS/ÂΜL (ref 0–0.1)
BASOPHILS NFR BLD AUTO: 0 % (ref 0–1)
BUN SERPL-MCNC: 8 MG/DL (ref 5–25)
CALCIUM SERPL-MCNC: 8.9 MG/DL (ref 8.4–10.2)
CHLORIDE SERPL-SCNC: 102 MMOL/L (ref 96–108)
CO2 SERPL-SCNC: 29 MMOL/L (ref 21–32)
CREAT SERPL-MCNC: 0.34 MG/DL (ref 0.6–1.3)
EOSINOPHIL # BLD AUTO: 0.02 THOUSAND/ÂΜL (ref 0–0.61)
EOSINOPHIL NFR BLD AUTO: 0 % (ref 0–6)
ERYTHROCYTE [DISTWIDTH] IN BLOOD BY AUTOMATED COUNT: 13.7 % (ref 11.6–15.1)
GFR SERPL CREATININE-BSD FRML MDRD: 113 ML/MIN/1.73SQ M
GLUCOSE SERPL-MCNC: 136 MG/DL (ref 65–140)
HCT VFR BLD AUTO: 34.3 % (ref 34.8–46.1)
HGB BLD-MCNC: 11.3 G/DL (ref 11.5–15.4)
IMM GRANULOCYTES # BLD AUTO: 0.19 THOUSAND/UL (ref 0–0.2)
IMM GRANULOCYTES NFR BLD AUTO: 1 % (ref 0–2)
LYMPHOCYTES # BLD AUTO: 2.09 THOUSANDS/ÂΜL (ref 0.6–4.47)
LYMPHOCYTES NFR BLD AUTO: 14 % (ref 14–44)
MAGNESIUM SERPL-MCNC: 2 MG/DL (ref 1.9–2.7)
MCH RBC QN AUTO: 29.9 PG (ref 26.8–34.3)
MCHC RBC AUTO-ENTMCNC: 32.9 G/DL (ref 31.4–37.4)
MCV RBC AUTO: 91 FL (ref 82–98)
MONOCYTES # BLD AUTO: 1.57 THOUSAND/ÂΜL (ref 0.17–1.22)
MONOCYTES NFR BLD AUTO: 11 % (ref 4–12)
NEUTROPHILS # BLD AUTO: 10.74 THOUSANDS/ÂΜL (ref 1.85–7.62)
NEUTS SEG NFR BLD AUTO: 74 % (ref 43–75)
NRBC BLD AUTO-RTO: 0 /100 WBCS
PHOSPHATE SERPL-MCNC: 1.7 MG/DL (ref 2.3–4.1)
PLATELET # BLD AUTO: 203 THOUSANDS/UL (ref 149–390)
PMV BLD AUTO: 9.6 FL (ref 8.9–12.7)
POTASSIUM SERPL-SCNC: 3.2 MMOL/L (ref 3.5–5.3)
RBC # BLD AUTO: 3.78 MILLION/UL (ref 3.81–5.12)
SODIUM SERPL-SCNC: 140 MMOL/L (ref 135–147)
WBC # BLD AUTO: 14.63 THOUSAND/UL (ref 4.31–10.16)

## 2024-12-19 PROCEDURE — 99024 POSTOP FOLLOW-UP VISIT: CPT | Performed by: NEUROLOGICAL SURGERY

## 2024-12-19 PROCEDURE — 85025 COMPLETE CBC W/AUTO DIFF WBC: CPT

## 2024-12-19 PROCEDURE — 97112 NEUROMUSCULAR REEDUCATION: CPT

## 2024-12-19 PROCEDURE — 83735 ASSAY OF MAGNESIUM: CPT

## 2024-12-19 PROCEDURE — 80048 BASIC METABOLIC PNL TOTAL CA: CPT

## 2024-12-19 PROCEDURE — 97535 SELF CARE MNGMENT TRAINING: CPT

## 2024-12-19 PROCEDURE — 99291 CRITICAL CARE FIRST HOUR: CPT | Performed by: OTOLARYNGOLOGY

## 2024-12-19 PROCEDURE — 99233 SBSQ HOSP IP/OBS HIGH 50: CPT | Performed by: INTERNAL MEDICINE

## 2024-12-19 PROCEDURE — 97116 GAIT TRAINING THERAPY: CPT

## 2024-12-19 PROCEDURE — 84100 ASSAY OF PHOSPHORUS: CPT

## 2024-12-19 RX ORDER — ACETAMINOPHEN 10 MG/ML
1000 INJECTION, SOLUTION INTRAVENOUS EVERY 8 HOURS SCHEDULED
Status: DISCONTINUED | OUTPATIENT
Start: 2024-12-19 | End: 2024-12-21 | Stop reason: HOSPADM

## 2024-12-19 RX ORDER — ACETAMINOPHEN 325 MG/1
975 TABLET ORAL EVERY 8 HOURS PRN
Status: DISCONTINUED | OUTPATIENT
Start: 2024-12-19 | End: 2024-12-19

## 2024-12-19 RX ORDER — POTASSIUM CHLORIDE 14.9 MG/ML
20 INJECTION INTRAVENOUS ONCE
Status: COMPLETED | OUTPATIENT
Start: 2024-12-19 | End: 2024-12-19

## 2024-12-19 RX ORDER — POTASSIUM CHLORIDE 14.9 MG/ML
20 INJECTION INTRAVENOUS ONCE
Status: DISCONTINUED | OUTPATIENT
Start: 2024-12-19 | End: 2024-12-19

## 2024-12-19 RX ORDER — ACETAMINOPHEN 325 MG/1
975 TABLET ORAL EVERY 8 HOURS SCHEDULED
Status: DISCONTINUED | OUTPATIENT
Start: 2024-12-19 | End: 2024-12-19

## 2024-12-19 RX ADMIN — LEVETIRACETAM 500 MG: 100 INJECTION, SOLUTION INTRAVENOUS at 08:18

## 2024-12-19 RX ADMIN — HYDROMORPHONE HYDROCHLORIDE 0.2 MG: 0.2 INJECTION, SOLUTION INTRAMUSCULAR; INTRAVENOUS; SUBCUTANEOUS at 10:31

## 2024-12-19 RX ADMIN — HYDROMORPHONE HYDROCHLORIDE 0.2 MG: 0.2 INJECTION, SOLUTION INTRAMUSCULAR; INTRAVENOUS; SUBCUTANEOUS at 19:14

## 2024-12-19 RX ADMIN — LEVETIRACETAM 500 MG: 100 INJECTION, SOLUTION INTRAVENOUS at 21:46

## 2024-12-19 RX ADMIN — SENNOSIDES AND DOCUSATE SODIUM 1 TABLET: 50; 8.6 TABLET ORAL at 21:46

## 2024-12-19 RX ADMIN — POTASSIUM CHLORIDE 20 MEQ: 14.9 INJECTION, SOLUTION INTRAVENOUS at 11:24

## 2024-12-19 RX ADMIN — HEPARIN SODIUM 5000 UNITS: 5000 INJECTION, SOLUTION INTRAVENOUS; SUBCUTANEOUS at 21:46

## 2024-12-19 RX ADMIN — ACETAMINOPHEN 1000 MG: 10 INJECTION INTRAVENOUS at 03:31

## 2024-12-19 RX ADMIN — CEFAZOLIN SODIUM 1000 MG: 1 SOLUTION INTRAVENOUS at 00:19

## 2024-12-19 RX ADMIN — FAMOTIDINE 20 MG: 10 INJECTION, SOLUTION INTRAVENOUS at 08:18

## 2024-12-19 RX ADMIN — POTASSIUM CHLORIDE 20 MEQ: 14.9 INJECTION, SOLUTION INTRAVENOUS at 13:26

## 2024-12-19 RX ADMIN — SODIUM PHOSPHATE, MONOBASIC, MONOHYDRATE AND SODIUM PHOSPHATE, DIBASIC, ANHYDROUS 30 MMOL: 142; 276 INJECTION, SOLUTION INTRAVENOUS at 09:58

## 2024-12-19 RX ADMIN — SODIUM CHLORIDE, SODIUM LACTATE, POTASSIUM CHLORIDE, AND CALCIUM CHLORIDE 125 ML/HR: .6; .31; .03; .02 INJECTION, SOLUTION INTRAVENOUS at 15:16

## 2024-12-19 RX ADMIN — FAMOTIDINE 20 MG: 10 INJECTION, SOLUTION INTRAVENOUS at 21:47

## 2024-12-19 RX ADMIN — PROMETHAZINE HYDROCHLORIDE 12.5 MG: 25 INJECTION INTRAMUSCULAR; INTRAVENOUS at 17:55

## 2024-12-19 RX ADMIN — POLYETHYLENE GLYCOL 3350 17 G: 17 POWDER, FOR SOLUTION ORAL at 08:18

## 2024-12-19 RX ADMIN — HEPARIN SODIUM 5000 UNITS: 5000 INJECTION, SOLUTION INTRAVENOUS; SUBCUTANEOUS at 14:30

## 2024-12-19 RX ADMIN — HEPARIN SODIUM 5000 UNITS: 5000 INJECTION, SOLUTION INTRAVENOUS; SUBCUTANEOUS at 06:22

## 2024-12-19 RX ADMIN — OXYCODONE HYDROCHLORIDE 5 MG: 5 TABLET ORAL at 01:19

## 2024-12-19 RX ADMIN — ACETAMINOPHEN 1000 MG: 1000 INJECTION, SOLUTION INTRAVENOUS at 21:45

## 2024-12-19 RX ADMIN — PROMETHAZINE HYDROCHLORIDE 12.5 MG: 25 INJECTION INTRAMUSCULAR; INTRAVENOUS at 08:23

## 2024-12-19 RX ADMIN — TRIMETHOBENZAMIDE HYDROCHLORIDE 200 MG: 100 INJECTION INTRAMUSCULAR at 10:01

## 2024-12-19 RX ADMIN — ACETAMINOPHEN 1000 MG: 1000 INJECTION, SOLUTION INTRAVENOUS at 10:00

## 2024-12-19 RX ADMIN — POTASSIUM CHLORIDE 20 MEQ: 14.9 INJECTION, SOLUTION INTRAVENOUS at 09:00

## 2024-12-19 RX ADMIN — CEFAZOLIN SODIUM 1000 MG: 1 SOLUTION INTRAVENOUS at 15:17

## 2024-12-19 RX ADMIN — CEFAZOLIN SODIUM 1000 MG: 1 SOLUTION INTRAVENOUS at 08:20

## 2024-12-19 RX ADMIN — HYDROMORPHONE HYDROCHLORIDE 0.2 MG: 0.2 INJECTION, SOLUTION INTRAMUSCULAR; INTRAVENOUS; SUBCUTANEOUS at 15:23

## 2024-12-19 NOTE — PHYSICAL THERAPY NOTE
PHYSICAL THERAPY NOTE          Patient Name: Anusha Mchugh  Today's Date: 12/19/2024 12/19/24 1200   PT Last Visit   PT Visit Date 12/19/24   Note Type   Note Type Treatment   Pain Assessment   Pain Assessment Tool 0-10   Pain Score 4   Pain Location/Orientation Location: Head   Patient's Stated Pain Goal No pain   Hospital Pain Intervention(s) Repositioned;Ambulation/increased activity   Wound 12/16/24 Head Right   Date First Assessed/Time First Assessed: 12/16/24 1119   Location: Head  Wound Location Orientation: Right  Incision's 1st Dressing: ADHESIVE SKIN HIGH VISCOSITY EXOFIN PRECISION PEN (x1)   Wound Description Swelling;Intact   Tali-wound Assessment Clean;Dry;Intact   Wound Site Closure Exofin   Drainage Amount Small   Drainage Description Serosanguineous   Dressing Open to air   Restrictions/Precautions   Weight Bearing Precautions Per Order No   Other Precautions Pain;Fall Risk;Multiple lines;Bed Alarm;Chair Alarm   General   Chart Reviewed Yes   Family/Caregiver Present No   Cognition   Orientation Level Oriented X4   Subjective   Subjective Pt willing and agreeable to PT session   Bed Mobility   Supine to Sit 4  Minimal assistance   Additional items Assist x 1;HOB elevated   Sit to Supine Unable to assess   Transfers   Sit to Stand 4  Minimal assistance   Additional items Assist x 1;Increased time required   Stand to Sit 4  Minimal assistance   Additional items Assist x 1;Increased time required   Ambulation/Elevation   Gait pattern Shuffling;Decreased foot clearance;Excessively slow;Inconsistent juanita   Gait Assistance 4  Minimal assist   Additional items Assist x 1;Tactile cues   Assistive Device Other (Comment)  (HHA)   Distance 25+25   Balance   Static Sitting Fair +   Dynamic Sitting Fair   Static Standing Fair -   Dynamic Standing Poor +   Ambulatory Poor +   Endurance Deficit   Endurance Deficit Yes    Endurance Deficit Description weakness, fatigue, pain, nausea,   Activity Tolerance   Activity Tolerance Patient limited by fatigue;Patient limited by pain   Medical Staff Made Aware OT   Nurse Made Aware yes   Exercises   Balance training  unsupported standing at sink to complete OT ADLs.   Assessment   Prognosis Excellent   Problem List Decreased strength;Decreased range of motion;Decreased endurance;Impaired balance;Decreased mobility;Decreased safety awareness;Pain;Decreased cognition   Goals   Patient Goals To go home   STG Expiration Date 12/31/24   Plan   Treatment/Interventions OT;Spoke to case management;Spoke to nursing;Gait training;Bed mobility;Patient/family training;Endurance training;LE strengthening/ROM;Functional transfer training   PT Frequency 3-5x/wk   Discharge Recommendation   Rehab Resource Intensity Level, PT III (Minimum Resource Intensity)   Equipment Recommended Walker   Walker Package Recommended Wheeled walker   AM-PAC Basic Mobility Inpatient   Turning in Flat Bed Without Bedrails 3   Lying on Back to Sitting on Edge of Flat Bed Without Bedrails 3   Moving Bed to Chair 3   Standing Up From Chair Using Arms 3   Walk in Room 3   Climb 3-5 Stairs With Railing 2   Basic Mobility Inpatient Raw Score 17   Basic Mobility Standardized Score 39.67   [facility] Highest Level Of Mobility   -HLM Goal 5: Stand one or more mins   -HLM Achieved 5: Stand (1 or more minutes)     Janeen Jones PT, DPT

## 2024-12-19 NOTE — CASE MANAGEMENT
Case Management Discharge Planning Note    Patient name Anusha Mchugh  Location ICU 07/ICU 07 MRN [id number]  : 1955 Date 2024       Current Admission Date: 2024  Current Admission Diagnosis:CSF otorrhea   Patient Active Problem List    Diagnosis Date Noted Date Diagnosed    MORBID (SEVERE) OBESITY DUE TO EXCESS CALORIES 2024     Prediabetes 2024     Preop examination 2024     CSF otorrhea 2024     LOUISE (obstructive sleep apnea) 2024     Sleep apnea treated with continuous positive airway pressure (CPAP) 2024     Decreased hearing of right ear 2024     Primary hypertension 2023     Abnormal mammogram of right breast 2023     Allergies 2023     Vitamin D deficiency 2023     Anxiety and depression 2023     Generalized arthritis 2023     Mild intermittent asthma with acute exacerbation 2023     Gastroesophageal reflux disease without esophagitis 2023     History of bilateral knee arthroplasty 2023       LOS (days): 3  Geometric Mean LOS (GMLOS) (days): 1.6  Days to GMLOS:-1.6     OBJECTIVE:  Risk of Unplanned Readmission Score: 8.74         Current admission status: Inpatient   Preferred Pharmacy:   [facility]/pharmacy #[id number] [place] [address]  Phone: [phone] Fax: [phone]    Primary Care Provider: Juju Mustafa MD    Primary Insurance: [employer] [facility]  Secondary Insurance:     DISCHARGE DETAILS:    Requested Home Health Care         Is the patient interested in HHC at discharge?: Yes  Home Health Discipline requested:: Nursing, Occupational Therapy, Physical Therapy  Home Health Agency Name:: [facility]  Home Health Follow-Up Provider:: PCP  Home Health Services Needed:: Evaluate Functional Status and Safety, Gait/ADL Training, Strengthening/Theraputic Exercises to Improve Function  Homebound Criteria Met:: Uses an Assist  Device (i.e. cane, walker, etc)  Supporting Clincal Findings:: Limited Endurance    Additional Comments: HHC choice list reviewed with pt at bedside, pt wishes to move forward with services through [facility]. [facility] reserved and added to AVS. RW has been delivered to bedside by liaison.

## 2024-12-19 NOTE — QUICK NOTE
"ENT resident, Jese, notified at 0040 that the patient had a small amount of serosanguinous-appearing fluid coming out of the right ear canal which previously had an otowick in place. On exam, the ear has some localized ecchymosis and swelling; no redness, irritation, or pain per patient. I inquired if this required further assessment, but the resident stated it was \"expected\" and that the ENT ream would discuss replacement of the otowick with AM rounds on 12/19. NCC will continue to monitor.       Ollie Morales DNP, JODEE  Neuro-Trauma Critical Care      "

## 2024-12-19 NOTE — PLAN OF CARE
Problem: PAIN - ADULT  Goal: Verbalizes/displays adequate comfort level or baseline comfort level  Description: Interventions:  - Encourage patient to monitor pain and request assistance  - Assess pain using appropriate pain scale  - Administer analgesics based on type and severity of pain and evaluate response  - Implement non-pharmacological measures as appropriate and evaluate response  - Consider cultural and social influences on pain and pain management  - Notify physician/advanced practitioner if interventions unsuccessful or patient reports new pain  Outcome: Progressing     Problem: INFECTION - ADULT  Goal: Absence or prevention of progression during hospitalization  Description: INTERVENTIONS:  - Assess and monitor for signs and symptoms of infection  - Monitor lab/diagnostic results  - Monitor all insertion sites, i.e. indwelling lines, tubes, and drains  - Monitor endotracheal if appropriate and nasal secretions for changes in amount and color  - [place] appropriate cooling/warming therapies per order  - Administer medications as ordered  - Instruct and encourage patient and family to use good hand hygiene technique  - Identify and instruct in appropriate isolation precautions for identified infection/condition  Outcome: Progressing  Goal: Absence of fever/infection during neutropenic period  Description: INTERVENTIONS:  - Monitor WBC    Outcome: Progressing     Problem: SAFETY ADULT  Goal: Patient will remain free of falls  Description: INTERVENTIONS:  - Educate patient/family on patient safety including physical limitations  - Instruct patient to call for assistance with activity   - Consult OT/PT to assist with strengthening/mobility   - Keep Call bell within reach  - Keep bed low and locked with side rails adjusted as appropriate  - Keep care items and personal belongings within reach  - Initiate and maintain comfort rounds  - Make Fall Risk Sign visible to staff  - Offer Toileting every  Hours, in  advance of need  - Initiate/Maintain alarm  - Obtain necessary fall risk management equipment:   - Apply yellow socks and bracelet for high fall risk patients  - Consider moving patient to room near nurses station  Outcome: Progressing  Goal: Maintain or return to baseline ADL function  Description: INTERVENTIONS:  -  Assess patient's ability to carry out ADLs; assess patient's baseline for ADL function and identify physical deficits which impact ability to perform ADLs (bathing, care of mouth/teeth, toileting, grooming, dressing, etc.)  - Assess/evaluate cause of self-care deficits   - Assess range of motion  - Assess patient's mobility; develop plan if impaired  - Assess patient's need for assistive devices and provide as appropriate  - Encourage maximum independence but intervene and supervise when necessary  - Involve family in performance of ADLs  - Assess for home care needs following discharge   - Consider OT consult to assist with ADL evaluation and planning for discharge  - Provide patient education as appropriate  Outcome: Progressing  Goal: Maintains/Returns to pre admission functional level  Description: INTERVENTIONS:  - Perform AM-PAC 6 Click Basic Mobility/ Daily Activity assessment daily.  - Set and communicate daily mobility goal to care team and patient/family/caregiver.   - Collaborate with rehabilitation services on mobility goals if consulted  - Perform Range of Motion  times a day.  - Reposition patient every  hours.  - Dangle patient  times a day  - Stand patient  times a day  - Ambulate patient  times a day  - Out of bed to chair  times a day   - Out of bed for meals times a day  - Out of bed for toileting  - Record patient progress and toleration of activity level   Outcome: Progressing     Problem: DISCHARGE PLANNING  Goal: Discharge to home or other facility with appropriate resources  Description: INTERVENTIONS:  - Identify barriers to discharge w/patient and caregiver  - Arrange for  needed discharge resources and transportation as appropriate  - Identify discharge learning needs (meds, wound care, etc.)  - Arrange for interpretive services to assist at discharge as needed  - Refer to Case Management Department for coordinating discharge planning if the patient needs post-hospital services based on physician/advanced practitioner order or complex needs related to functional status, cognitive ability, or social support system  Outcome: Progressing     Problem: Knowledge Deficit  Goal: Patient/family/caregiver demonstrates understanding of disease process, treatment plan, medications, and discharge instructions  Description: Complete learning assessment and assess knowledge base.  Interventions:  - Provide teaching at level of understanding  - Provide teaching via preferred learning methods  Outcome: Progressing     Problem: Prexisting or High Potential for Compromised Skin Integrity  Goal: Skin integrity is maintained or improved  Description: INTERVENTIONS:  - Identify patients at risk for skin breakdown  - Assess and monitor skin integrity  - Assess and monitor nutrition and hydration status  - Monitor labs   - Assess for incontinence   - Turn and reposition patient  - Assist with mobility/ambulation  - Relieve pressure over bony prominences  - Avoid friction and shearing  - Provide appropriate hygiene as needed including keeping skin clean and dry  - Evaluate need for skin moisturizer/barrier cream  - Collaborate with interdisciplinary team   - Patient/family teaching  - Consider wound care consult   Outcome: Progressing

## 2024-12-19 NOTE — OCCUPATIONAL THERAPY NOTE
Occupational Therapy Treatment Note      Anusha Mchugh    12/19/2024    Active Problems:    Decreased hearing of right ear    CSF otorrhea    MORBID (SEVERE) OBESITY DUE TO EXCESS CALORIES      Past Medical History:   Diagnosis Date    Anxiety     Arthritis     CPAP (continuous positive airway pressure) dependence     Depression     Ear problems     GERD (gastroesophageal reflux disease)     HL (hearing loss) Nov 2023    Started as Sporadic. Seems worse now    Hypertension     Nasal congestion     Very frequent    Obesity N/A    PONV (postoperative nausea and vomiting)     Sleep apnea Sept 2009       Past Surgical History:   Procedure Laterality Date    BREAST SURGERY      cyst removal on bilateral breast    ECTOPIC PREGNANCY SURGERY      EYE SURGERY      cataract    KNEE SURGERY      RETROMASTOID CRANIOTOMY Right 12/16/2024    Procedure: CRANIOTOMY MIDDLE FOSSA,RETROMASTOID APPROACH FOR ENT;  Surgeon: Briseyda Echols MD;  Location: [facility];  Service: ENT    TEGMAN DEFECT REPAIR CSF LEAK Right 12/16/2024    Procedure: Right endoscopic middle fossa craniotomy for repair of tegmen defect and CSF leak with temporalis fascia or muscle flap, with neuromonitoring (CN 7/8) and intraoperative lumbar drain placement;  Surgeon: Oscar Borjas MD;  Location: [facility];  Service: Neurosurgery        12/19/24 1158   OT Last Visit   OT Visit Date 12/19/24   Note Type   Note Type Treatment   Pain Assessment   Pain Assessment Tool 0-10   Pain Score 4   Pain Location/Orientation Location: Head   Pain Onset/Description Onset: Ongoing;Descriptor: Aching;Descriptor: Discomfort;Descriptor: Headache   Patient's Stated Pain Goal No pain   Hospital Pain Intervention(s) Repositioned;Ambulation/increased activity;Emotional support   Restrictions/Precautions   Weight Bearing Precautions Per Order No   Other Precautions Chair Alarm;Bed Alarm;Multiple lines;Telemetry;Fall Risk;Pain  ([place]; lumbar drain)   Lifestyle   Autonomy PTA,  pt was independent in ADLs/IADLs and uses no DME for functional mobility; (+) driving   Reciprocal Relationships Lives with her daughter who works during the day   Service to Others Retired   Intrinsic Gratification Enjoys sewing   ADL   Grooming Assistance 5  Supervision/Setup   Grooming Deficit Setup;Verbal cueing;Supervision/safety;Increased time to complete;Teeth care;Wash/dry hands;Wash/dry face   Grooming Comments pt completed oral care and washed face/hands w/ setup @ sink. Assist to comb back of head once seated back in recliner.   Bed Mobility   Supine to Sit 4  Minimal assistance   Additional items Assist x 1;HOB elevated;Increased time required;Verbal cues;LE management   Sit to Supine Unable to assess   Additional Comments pt sat EOB w/ Fair sitting balance/trunk control   Transfers   Sit to Stand 4  Minimal assistance   Additional items Assist x 1;Increased time required;Verbal cues   Stand to Sit 4  Minimal assistance   Additional items Assist x 1;Increased time required;Verbal cues   Additional Comments w HHA   Functional Mobility   Functional Mobility 4  Minimal assistance   Additional Comments pt engaged in short household distance w/ Min A x1; HHA used; SBA 2nd for line management.   Additional items Hand hold assistance   Cognition   Overall Cognitive Status WFL   Arousal/Participation Responsive;Cooperative   Attention Within functional limits   Orientation Level Oriented X4   Memory Within functional limits   Following Commands Follows all commands and directions without difficulty   Comments pt is pleasant and cooperative   Activity Tolerance   Activity Tolerance Patient limited by fatigue;Patient limited by pain   Medical Staff Made Aware PT, RN   Assessment   Assessment Patient participated in Skilled OT session 12/19/2024 with interventions consisting of ADL re training with the use of correct body mechnaics, Energy Conservation techniques, deep breathing technique, safety awareness and fall  prevention techniques, therapeutic exercise to: increase functional use of BUEs, increase BUE muscle strength ,  therapeutic activities to: increase activity tolerance, increase standing tolerance time with unilateral UE support to complete sink level ADLs, increase dynamic sit/ stand balance during functional activity , increase postural control, increase trunk control, and increase OOB/ sitting tolerance . Patient agreeable to OT treatment session, upon arrival patient was found supine in bed.  In comparison to previous session, patient with improvements in standing tolerance and endurance. Patient requiring verbal cues for safety, verbal cues for correct technique, and verbal cues for pacing thru activity steps. Patient continues to be functioning below baseline level, occupational performance remains limited secondary to factors listed above and increased risk for falls and injury.   From OT standpoint, recommendation at time of d/c would be Home OT, when medically stable.   Patient to benefit from continued Occupational Therapy treatment while in the hospital to address deficits as defined above and maximize level of functional independence with ADLs and functional mobility.   Plan   Treatment Interventions ADL retraining;Functional transfer training;UE strengthening/ROM;Endurance training;Patient/family training;Equipment evaluation/education;Compensatory technique education;Continued evaluation;Energy conservation;Activityengagement   Goal Expiration Date 12/31/24   OT Treatment Day 1   OT Frequency 2-3x/wk   Discharge Recommendation   Rehab Resource Intensity Level, OT III (Minimum Resource Intensity)   Additional Comments  The patient's raw score on the AM-PAC Daily Activity Inpatient Short Form is 19. A raw score of greater than or equal to 19 suggests the patient may benefit from discharge to home. Please refer to the recommendation of the Occupational Therapist for safe discharge planning.   Additional  Comments 2 Pt seen as a co-session due to the patient's co-morbidities, clinically unstable presentation, and present impairments which are a regression from the patient's baseline.   AM-PAC Daily Activity Inpatient   Lower Body Dressing 3   Bathing 3   Toileting 3   Upper Body Dressing 3   Grooming 3   Eating 4   Daily Activity Raw Score 19   Daily Activity Standardized Score (Calc for Raw Score >=11) 40.22   AM-PAC Applied Cognition Inpatient   Following a Speech/Presentation 4   Understanding Ordinary Conversation 4   Taking Medications 4   Remembering Where Things Are Placed or Put Away 4   Remembering List of 4-5 Errands 4   Taking Care of Complicated Tasks 3   Applied Cognition Raw Score 23   Applied Cognition Standardized Score 53.08   End of Consult   Education Provided Yes;Family or social support of family present for education by provider   Patient Position at End of Consult Bedside chair;Bed/Chair alarm activated;All needs within reach   Nurse Communication Nurse aware of consult     Griselda Doran MS, OTR/L

## 2024-12-19 NOTE — CASE MANAGEMENT
Case Management Discharge Planning Note    Patient name Anusha Mchugh  Location ICU 07/ICU 07 MRN [id number]  : 1955 Date 2024       Current Admission Date: 2024  Current Admission Diagnosis:CSF otorrhea   Patient Active Problem List    Diagnosis Date Noted Date Diagnosed    MORBID (SEVERE) OBESITY DUE TO EXCESS CALORIES 2024     Prediabetes 2024     Preop examination 2024     CSF otorrhea 2024     LOUISE (obstructive sleep apnea) 2024     Sleep apnea treated with continuous positive airway pressure (CPAP) 2024     Decreased hearing of right ear 2024     Primary hypertension 2023     Abnormal mammogram of right breast 2023     Allergies 2023     Vitamin D deficiency 2023     Anxiety and depression 2023     Generalized arthritis 2023     Mild intermittent asthma with acute exacerbation 2023     Gastroesophageal reflux disease without esophagitis 2023     History of bilateral knee arthroplasty 2023       LOS (days): 3  Geometric Mean LOS (GMLOS) (days): 1.6  Days to GMLOS:-1.6     OBJECTIVE:  Risk of Unplanned Readmission Score: 8.74         Current admission status: Inpatient   Preferred Pharmacy:   [facility]/pharmacy #[id number] - [place] [address]  Phone: [phone] Fax: [phone]    Primary Care Provider: Juju Mustafa MD    Primary Insurance: [employer] [facility]  Secondary Insurance:     DISCHARGE DETAILS:    DME Referral Provided  Referral made for DME?: Yes  DME referral completed for the following items:: Walker  DME Supplier Name:: [employer]    Other Referral/Resources/Interventions Provided:  Interventions: HHC  Referral Comments: HHC referrals sent via [employer]. RW order placed via [place] and sent to TERRY Day for signature.    Additional Comments: CM met with pt at bedside to review level 3 therapy recommendation and  recommendation for rolling walker. Pt in agreement with both recommendations. HHC referrals sent via [employer], awaiting accepting agencies. RW order placed via [place].  department to follow.

## 2024-12-19 NOTE — PLAN OF CARE
Problem: PAIN - ADULT  Goal: Verbalizes/displays adequate comfort level or baseline comfort level  Description: Interventions:  - Encourage patient to monitor pain and request assistance  - Assess pain using appropriate pain scale  - Administer analgesics based on type and severity of pain and evaluate response  - Implement non-pharmacological measures as appropriate and evaluate response  - Consider cultural and social influences on pain and pain management  - Notify physician/advanced practitioner if interventions unsuccessful or patient reports new pain  Outcome: Progressing     Problem: INFECTION - ADULT  Goal: Absence or prevention of progression during hospitalization  Description: INTERVENTIONS:  - Assess and monitor for signs and symptoms of infection  - Monitor lab/diagnostic results  - Monitor all insertion sites, i.e. indwelling lines, tubes, and drains  - Monitor endotracheal if appropriate and nasal secretions for changes in amount and color  - [place] appropriate cooling/warming therapies per order  - Administer medications as ordered  - Instruct and encourage patient and family to use good hand hygiene technique  - Identify and instruct in appropriate isolation precautions for identified infection/condition  Outcome: Progressing  Goal: Absence of fever/infection during neutropenic period  Description: INTERVENTIONS:  - Monitor WBC    Outcome: Progressing     Problem: SAFETY ADULT  Goal: Patient will remain free of falls  Description: INTERVENTIONS:  - Educate patient/family on patient safety including physical limitations  - Instruct patient to call for assistance with activity   - Consult OT/PT to assist with strengthening/mobility   - Keep Call bell within reach  - Keep bed low and locked with side rails adjusted as appropriate  - Keep care items and personal belongings within reach  - Initiate and maintain comfort rounds  - Make Fall Risk Sign visible to staff  - Offer Toileting every  Hours,  in advance of need  - Initiate/Maintain alarm  - Obtain necessary fall risk management equipment:   - Apply yellow socks and bracelet for high fall risk patients  - Consider moving patient to room near nurses station  Outcome: Progressing  Goal: Maintain or return to baseline ADL function  Description: INTERVENTIONS:  -  Assess patient's ability to carry out ADLs; assess patient's baseline for ADL function and identify physical deficits which impact ability to perform ADLs (bathing, care of mouth/teeth, toileting, grooming, dressing, etc.)  - Assess/evaluate cause of self-care deficits   - Assess range of motion  - Assess patient's mobility; develop plan if impaired  - Assess patient's need for assistive devices and provide as appropriate  - Encourage maximum independence but intervene and supervise when necessary  - Involve family in performance of ADLs  - Assess for home care needs following discharge   - Consider OT consult to assist with ADL evaluation and planning for discharge  - Provide patient education as appropriate  Outcome: Progressing  Goal: Maintains/Returns to pre admission functional level  Description: INTERVENTIONS:  - Perform AM-PAC 6 Click Basic Mobility/ Daily Activity assessment daily.  - Set and communicate daily mobility goal to care team and patient/family/caregiver.   - Collaborate with rehabilitation services on mobility goals if consulted  - Perform Range of Motion  times a day.  - Reposition patient every  hours.  - Dangle patient  times a day  - Stand patient  times a day  - Ambulate patient  times a day  - Out of bed to chair  times a day   - Out of bed for meals  times a day  - Out of bed for toileting  - Record patient progress and toleration of activity level   Outcome: Progressing     Problem: DISCHARGE PLANNING  Goal: Discharge to home or other facility with appropriate resources  Description: INTERVENTIONS:  - Identify barriers to discharge w/patient and caregiver  - Arrange for  needed discharge resources and transportation as appropriate  - Identify discharge learning needs (meds, wound care, etc.)  - Arrange for interpretive services to assist at discharge as needed  - Refer to Case Management Department for coordinating discharge planning if the patient needs post-hospital services based on physician/advanced practitioner order or complex needs related to functional status, cognitive ability, or social support system  Outcome: Progressing     Problem: Knowledge Deficit  Goal: Patient/family/caregiver demonstrates understanding of disease process, treatment plan, medications, and discharge instructions  Description: Complete learning assessment and assess knowledge base.  Interventions:  - Provide teaching at level of understanding  - Provide teaching via preferred learning methods  Outcome: Progressing     Problem: Prexisting or High Potential for Compromised Skin Integrity  Goal: Skin integrity is maintained or improved  Description: INTERVENTIONS:  - Identify patients at risk for skin breakdown  - Assess and monitor skin integrity  - Assess and monitor nutrition and hydration status  - Monitor labs   - Assess for incontinence   - Turn and reposition patient  - Assist with mobility/ambulation  - Relieve pressure over bony prominences  - Avoid friction and shearing  - Provide appropriate hygiene as needed including keeping skin clean and dry  - Evaluate need for skin moisturizer/barrier cream  - Collaborate with interdisciplinary team   - Patient/family teaching  - Consider wound care consult   Outcome: Progressing

## 2024-12-19 NOTE — PROGRESS NOTES
Progress Note - Critical Care/ICU   Name: Anusha Mchugh 69 y.o. 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 Localized ecchymosis/swelling. No   LD CSF drainage  NSG clamp of LD drain 12/18 potential removal 12/19  Ancef while drain in place  Decreased hearing R ear - unchanged  History of depression  Home paroxetine 20 mg p.o.     CV:   Chronic HTN  Pressures controlled w/o meds  Holding home HCTZ 25mg PO, losartan 100mg PO      Pulm:  Mild intermittent asthma  Home albuterol 2 puffs every 6 as needed  PRN Loratadine 10 mg  LOUISE  Hold CPAP in setting of recent craniotomy     GI:   Postoperative nausea and vomiting  Phenergan 12.5mg IV q6 prn  Tigan 200mg IM q6 prn   Will attempt transition to PO meds. Regular diet ordered.  GERD  Plan: Pepcid 20 mg, Tums     :   No active issues     F/E/N:   Fluids: LR 125ml/h, will continue due to patient's continued episodes of vomiting  Electrolytes: Replete as needed; K+ >4, Phos >3, Mg >2.   Nutrition: Regular diet, bowel regimen in place-senna docusate     Heme/Onc:   WBC downtrending, off decadron.     Endo:   No active issues     ID:   No active issues     MSK/Skin:   No active issues    Tubes:   Lines: PIV, L radial A line  Drains: Lumbar drain clamped  Disposition: Critical care    ICU Core Measures     A: Assess, Prevent, and Manage Pain Has  pain been assessed? Yes  Need for changes to pain regimen? No   B: Both SAT/SAT  N/A   C: Choice of Sedation RASS Goal: 0 Alert and Calm or N/A patient not on sedation  Need for changes to sedation or analgesia regimen? NA   D: Delirium CAM-ICU: Negative   E: Early Mobility  Plan for early mobility? Yes   F: Family Engagement Plan for family engagement today? Yes       Antibiotic Review: Post op requirements     Review of Invasive Devices:        Elida Plan: Keep arterial line for hemodynamic monitoring    Prophylaxis:  VTE VTE covered by:  heparin (porcine), Subcutaneous, 5,000 Units at 12/18/24 2220       Stress Ulcer  covered byFamotidine (PF) (PEPCID) injection 20 mg [[id number]], famotidine (PEPCID) 20 mg tablet [[id number]] (Long-Term Med)         24 Hour Events : No ON events. Episode of vomiting in AM after food/po meds  Subjective   Review of Systems: Review of Systems   Constitutional:  Negative for chills and fever.   HENT:  Positive for hearing loss (Right muffled hearing).    Eyes:  Negative for photophobia and visual disturbance.   Respiratory:  Negative for shortness of breath.    Cardiovascular:  Negative for chest pain.   Gastrointestinal:  Positive for nausea. Negative for abdominal pain, constipation, diarrhea and vomiting.   Neurological:  Positive for headaches. Negative for dizziness, tremors, facial asymmetry, speech difficulty, weakness, light-headedness and numbness.       Objective :                   Vitals I/O      Most Recent Min/Max in 24hrs   Temp 98.5 °F (36.9 °C) Temp  Min: 98 °F (36.7 °C)  Max: 98.6 °F (37 °C)   Pulse 62 Pulse  Min: 52  Max: 88   Resp 20 Resp  Min: 16  Max: 36   BP (!) 171/76 BP  Min: 132/87  Max: 171/76   O2 Sat 93 % SpO2  Min: 90 %  Max: 98 %      Intake/Output Summary (Last 24 hours) at 12/19/2024 0550  Last data filed at 12/19/2024 0200  Gross per 24 hour   Intake 3352.08 ml   Output 3490 ml   Net -137.92 ml       Diet Regular; Regular House    Invasive Monitoring    Arterial Line  [place] /64  Arterial Line BP  Min: 106/100  Max: 176/82   MAP 92 mmHg  Arterial Line MAP (mmHg)  Min: 84 mmHg  Max: 128 mmHg           Physical Exam   Physical Exam  Vitals and nursing note reviewed.   Eyes:      General: No visual field deficit.     Extraocular Movements: Extraocular movements intact.      Right eye: Normal extraocular motion and no nystagmus.      Left eye: Normal extraocular motion and no nystagmus.      Pupils: Pupils are equal.   Skin:     General: Skin is warm and dry.   HENT:      Head: Normocephalic and atraumatic. No right periorbital erythema or left periorbital erythema.      Left Ear: Hearing normal.      Ears:      Comments: Noted localized ecchymosis and swelling to the right ear.  Fallout of serosanguineous fluid from right ear canal with previously otowick was placed.    Muffled hearing right ear.     Mouth/Throat:      Mouth: Mucous membranes are moist.   Cardiovascular:      Rate and Rhythm: Normal rate and regular rhythm.      Pulses: No decreased pulses.           Carotid pulses are 2+ on the right side and 2+ on the left side.       Posterior tibial pulses are 2+ on the right side and 2+ on the left side.      Heart sounds: Normal heart sounds.   Musculoskeletal:      Right lower leg: No edema.      Left lower leg: No edema.      Comments: Left radial A-line  Lumbar drain in place.   Abdominal:      Palpations: Abdomen is soft.      Tenderness: There is no abdominal tenderness.   Constitutional:       General: She is not in acute distress.     Appearance: She is not ill-appearing.      Interventions: She is not sedated and not intubated.  Pulmonary:      Effort: No tachypnea, bradypnea, accessory muscle usage, respiratory distress, retractions or accessory muscle usage. She is not intubated.      Breath sounds: No stridor or decreased air movement. No decreased breath sounds, wheezing, rhonchi or rales.   Neurological:      Mental Status: She is alert.       Sensory: No sensory deficit.      Motor: No weakness.      Comments: Cranial nerves intact with exception of right muffled hearing.          Diagnostic Studies        Lab Results: I have reviewed the following results:     Medications:  Scheduled PRN   acetaminophen, 1,000 mg, Q8H  cefazolin, 1,000 mg, Q8H  famotidine, 20 mg, Q12H TD  heparin (porcine), 5,000 Units, Q8H TD  levETIRAcetam, 500 mg, Q12H TD  [Held by provider] losartan, 100 mg, Daily  PARoxetine, 20 mg, Daily  polyethylene glycol, 17 g, Daily  senna-docusate sodium, 1 tablet, HS      albuterol, 2 puff, Q6H PRN  calcium carbonate, 1,000 mg, Daily PRN  HYDROmorphone, 0.2 mg, Q2H PRN  loratadine, 10 mg, Daily PRN  oxyCODONE, 2.5 mg, Q4H PRN   Or  oxyCODONE, 5 mg, Q4H PRN  promethazine, 12.5 mg, Q6H PRN  trimethobenzamide, 200 mg, Q6H PRN       Continuous    lactated ringers, 125 mL/hr, Last Rate: 125 mL/hr (12/18/24 1104)         Labs:   CBC    Recent Labs     12/18/24  0504   WBC 20.92*   HGB 11.5   HCT 34.2*        BMP    Recent Labs     12/17/24 2256 12/18/24  0504   SODIUM 135 138   K 3.5 3.6    104   CO2 25 27   AGAP 8 7   BUN 9 8   CREATININE 0.41* 0.39*   CALCIUM 8.3* 8.8       Coags    No recent results     Additional Electrolytes  Recent Labs     12/17/24 2256 12/18/24  0504   MG 2.3 2.3   PHOS 2.5 1.9*          Blood Gas    No recent results  No recent results LFTs  No recent results    Infectious  No recent results  Glucose  Recent Labs     12/17/24  1402 12/17/24 2256 12/18/24  0504   GLUC 175* 174* 152*

## 2024-12-19 NOTE — PROGRESS NOTES
Patient:    MRN:  [id number]    Sindy Request ID:  [id number]    Level of care reserved:  Home [facility] Agency    Partner Reserved:  [facility], [place] [address] [phone]    Clinical needs requested:  physical therapy, occupational therapy    Geography searched:  [phone]    Start of Service:    Request sent:  12:15pm EST on 12/19/2024 by Judy Cagle    Partner reserved:  1:58pm EST on 12/19/2024 by Judy Cagle    Choice list shared:  1:57pm EST on 12/19/2024 by Judy Cagle

## 2024-12-19 NOTE — PROGRESS NOTES
Progress Note - ENT   Name: Anusha Mchugh 69 y.o. female I MRN: [id number]  Unit/Bed#: ICU 07 I Date of Admission: 12/16/2024   Date of Service: 12/19/2024 I Hospital Day: 3   OTOLARYNGOLOGY PROGRESS NOTE    Date of Service: 12/19/2024 6:37 AM    HPI  Patient is a 69 y.o. 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    NAEO. AVSS.   Denies dizziness and vomiting. She did have nausea overnight, which resolved with phenergan. She also states she is having headaches up to 6-7/10 pain level.     PHYSICAL EXAM:  Vitals:    12/19/24 0400   BP:    Pulse: 62   Resp: 20   Temp: 98.5 °F (36.9 °C)   SpO2: 93%        General: No acute distress, AOx4  HEENT: R ear incision c/d/I, minimal dried blood around R EAC. Otowick in place with expected serosanguinous drainage. EOMi  Facial nerve intact in all branches.   Neurology: No focal deficits, nystagmus bilaterally  Lumbar drain open.  Lungs: Breathing easy, unlabored and even on room air  Cardio: well perfused      Intake/Output Summary (Last 24 hours) at 12/19/2024 0637  Last data filed at 12/19/2024 0200  Gross per 24 hour   Intake 3102.08 ml   Output 3480 ml   Net -377.92 ml         LABORATORY    Recent Labs     12/16/24  1248 12/17/24  0514 12/18/24  0504   WBC  --  17.82* 20.92*   HGB 12.2 12.3 11.5   HCT 36 37.4 34.2*   PLT  --  225 212       Recent Labs     12/16/24  1816 12/17/24  0514 12/17/24  1402 12/17/24  2256 12/18/24  0504   K  --  2.9* 3.3* 3.5 3.6   CL  --  102 101 102 104   BUN  --  9 9 9 8   PHOS 2.6  --  1.5* 2.5 1.9*   MG 1.5*  --  2.6 2.3 2.3     UOP 1.4 cc/kg/hr    Patient Active Problem List   Diagnosis    Primary hypertension    Abnormal mammogram of right breast     Allergies    Vitamin D deficiency    Anxiety and depression    Generalized arthritis    Mild intermittent asthma with acute exacerbation    Gastroesophageal reflux disease without esophagitis    History of bilateral knee arthroplasty    Sleep apnea treated with continuous positive airway pressure (CPAP)    Decreased hearing of right ear    LOUISE (obstructive sleep apnea)    Prediabetes    Preop examination    CSF otorrhea    MORBID (SEVERE) OBESITY DUE TO EXCESS CALORIES         ASSESSMENT  Patient is a 69 y.o. female w/ acute and chronic problems as above, who presents POD 2 s/p endoscopic right middle fossa craniotomy for repair of tegmen defect and CSF leak with temporalis fascia or muscle flap, with neuromonitoring (CN 7/8) and intraoperative lumbar drain placement, doing well.    PLAN  - lumbar drain management per NSGY  - keep R otowick in place  - PO or IV antibiotics while wick in place, no antibiotic drops to the ear   - rest of care per primary    Mana Mendoza M.D.  PGY-1  Otolaryngology, Head and Neck Surgery  Please contact ENT Resident role on Epic secure chat with any questions or concerns.

## 2024-12-19 NOTE — ASSESSMENT & PLAN NOTE
POD 3 CRANIOTOMY MIDDLE FOSSA,RETROMASTOID APPROACH FOR ENT  Right endoscopic middle fossa craniotomy for repair of tegmen defect and CSF leak with temporalis fascia or muscle flap, with neuromonitoring (CN 7/8) and intraoperative lumbar drain placement ([place] / Kinza, 12/16/24)    Imaging:  CT head w/o, 12/16/24: New right craniectomy and mastoidectomy. Fluid, foci of air and high attenuation material in the mastoid cavity compatible with surgical material. Soft tissue swelling.     Plan:  Continue to monitor neurological exam  STAT CT head w/o for decline in GCS greater than 2 points in 1 hour  Lumbar drain in place  Clamp today. Monitor for increased drainage R ear  Ancef while drain in place  Tentative plan to remove Friday, discharge patient Saturday  ENT following, appreciate recommendations  Slight drainage from ear, appears bloody  R otowick in place, keep for 3 days  No antibiotic drops to ear  NCC consulted for ICU management, appreciate recommendations  SBP < 160  Medical management for post op nausea, PRN zofran and reglan ordered. Added Tigan and phenergan for continued post op nausea and vomiting  Post op labs reviewed. Expected leukocytosis 2/2 steroids and post op state  Decadron x48h for vasogenic edema  PPI while on steroids  Keppra x7d for seizure ppx  PT/OT evaluation, ok to mobilize  DVT ppx: SCDs, SQH TID (to end at MN for LD removal tomorrow)    Neurosurgery will continue to follow as primary team. Please call with questions or concerns.

## 2024-12-19 NOTE — PROGRESS NOTES
Progress Note - Neurosurgery   Name: Anusha Mchugh 69 y.o. female I MRN: [id number]  Unit/Bed#: ICU 07 I Date of Admission: 12/16/2024   Date of Service: 12/19/2024 I Hospital Day: 3     Assessment & Plan  CSF otorrhea  POD 3 CRANIOTOMY MIDDLE FOSSA,RETROMASTOID APPROACH FOR ENT  Right endoscopic middle fossa craniotomy for repair of tegmen defect and CSF leak with temporalis fascia or muscle flap, with neuromonitoring (CN 7/8) and intraoperative lumbar drain placement ([place] / Kinza, 12/16/24)    Imaging:  CT head w/o, 12/16/24: New right craniectomy and mastoidectomy. Fluid, foci of air and high attenuation material in the mastoid cavity compatible with surgical material. Soft tissue swelling.     Plan:  Continue to monitor neurological exam  STAT CT head w/o for decline in GCS greater than 2 points in 1 hour  Lumbar drain in place  Clamp today. Monitor for increased drainage R ear  Ancef while drain in place  Tentative plan to remove Friday, discharge patient Saturday  ENT following, appreciate recommendations  Slight drainage from ear, appears bloody  R otowick in place, keep for 3 days  No antibiotic drops to ear  NCC consulted for ICU management, appreciate recommendations  SBP < 160  Medical management for post op nausea, PRN zofran and reglan ordered. Added Tigan and phenergan for continued post op nausea and vomiting  Post op labs reviewed. Expected leukocytosis 2/2 steroids and post op state  Decadron x48h for vasogenic edema  PPI while on steroids  Keppra x7d for seizure ppx  PT/OT evaluation, ok to mobilize  DVT ppx: SCDs, SQH TID (to end at MN for LD removal tomorrow)    Neurosurgery will continue to follow as primary team. Please call with questions or concerns.   Decreased hearing of right ear  Secondary to tegman defect, present on arrival  MORBID (SEVERE) OBESITY DUE TO EXCESS CALORIES  Recommend lifestyle modification         Subjective   Patient continues with intermittent vomiting overnight.   She was able to tolerate some p.o. medications but vomited again this morning when attempting to eat toast.  She complains of nausea when sitting up.  Otherwise, she has no new neurological complaints today.  She continues to have serosanguineous drainage from her right ear.  Otowick remains in place per ENT.    Objective :  Temp:  [98.1 °F (36.7 °C)-98.6 °F (37 °C)] 98.3 °F (36.8 °C)  HR:  [52-88] 60  BP: (132-171)/(76-88) 171/76  Resp:  [16-24] 19  SpO2:  [90 %-95 %] 93 %  O2 Device: None (Room air)    I/O         12/17 0701 12/18 0700 12/18 0701 12/19 0700 12/19 0701  12/20 0700    P.O.  120     I.V. (mL/kg) 3260.9 (32.7) 3002.1 (30.1) 250 (2.5)    IV Piggyback 1100 700     Total Intake(mL/kg) 4360.9 (43.7) 3822.1 (38.3) 250 (2.5)    Urine (mL/kg/hr) 1887.5 (0.8) 3900 (1.6) 450 (1.7)    Emesis/NG output       Drains 230 230 10    Other       Blood       Total Output 2117.5 4130 460    Net +2243.4 -307.9 -210                 Physical Exam Neurological Exam  General appearance: alert, appears stated age, cooperative. Ill appearing.   Head: Normocephalic, without obvious abnormality, atraumatic  Eyes: EOMI, PERRL, conjugate gaze  Neck: supple, symmetrical, trachea midline  Back: LD in place  Lungs: non labored breathing  Heart: regular heart rate  Neurologic:   Mental status: Alert, oriented x3, thought content appropriate, speech clear and fluent  Cranial nerves: grossly intact (Cranial nerves II-XII), R hearing loss  Sensory: normal to LT  Motor: moving all extremities without focal weakness   Coordination: finger to nose normal bilaterally, no drift bilaterally      Lab Results: I have reviewed the following results:  Recent Labs     12/16/24  1248 12/16/24  1816 12/17/24  0514 12/17/24  1402 12/19/24  0622   WBC  --   --  17.82*   < > 14.63*   HGB 12.2  --  12.3   < > 11.3*   HCT 36  --  37.4   < > 34.3*   PLT  --   --  225   < > 203   SODIUM  --   --  138   < > 140   K  --   --  2.9*   < > 3.2*   CL  --   --   102   < > 102   CO2 22  --  25   < > 29   BUN  --   --  9   < > 8   CREATININE  --   --  0.44*   < > 0.34*   GLUC  --   --  163*   < > 136   CAIONIZED 1.12  --   --   --   --    MG  --    < >  --    < > 2.0   PHOS  --    < >  --    < > 1.7*   PTT  --   --  24  --   --    INR  --   --  0.98  --   --     < > = values in this interval not displayed.       Imaging Results Review: No pertinent imaging studies reviewed.  Other Study Results Review: No additional pertinent studies reviewed.    VTE Pharmacologic Prophylaxis: Sequential compression device (Venodyne)  and Heparin

## 2024-12-19 NOTE — PLAN OF CARE
Problem: OCCUPATIONAL THERAPY ADULT  Goal: Performs self-care activities at highest level of function for planned discharge setting.  See evaluation for individualized goals.  Description: Treatment Interventions: ADL retraining, Functional transfer training, UE strengthening/ROM, Endurance training, Patient/family training, Equipment evaluation/education, Compensatory technique education, Continued evaluation, Energy conservation, Activityengagement          See flowsheet documentation for full assessment, interventions and recommendations.   Outcome: Progressing  Note: Limitation: Decreased ADL status, Decreased endurance, Decreased self-care trans, Decreased high-level ADLs  Prognosis: Fair  Assessment: Patient participated in Skilled OT session 12/19/2024 with interventions consisting of ADL re training with the use of correct body mechnaics, Energy Conservation techniques, deep breathing technique, safety awareness and fall prevention techniques, therapeutic exercise to: increase functional use of BUEs, increase BUE muscle strength ,  therapeutic activities to: increase activity tolerance, increase standing tolerance time with unilateral UE support to complete sink level ADLs, increase dynamic sit/ stand balance during functional activity , increase postural control, increase trunk control, and increase OOB/ sitting tolerance . Patient agreeable to OT treatment session, upon arrival patient was found supine in bed.  In comparison to previous session, patient with improvements in standing tolerance and endurance. Patient requiring verbal cues for safety, verbal cues for correct technique, and verbal cues for pacing thru activity steps. Patient continues to be functioning below baseline level, occupational performance remains limited secondary to factors listed above and increased risk for falls and injury.   From OT standpoint, recommendation at time of d/c would be Home OT, when medically stable.   Patient to  benefit from continued Occupational Therapy treatment while in the hospital to address deficits as defined above and maximize level of functional independence with ADLs and functional mobility.     Rehab Resource Intensity Level, OT: III (Minimum Resource Intensity)     Griselda Doran MS, OTR/L

## 2024-12-19 NOTE — PLAN OF CARE
Problem: PHYSICAL THERAPY ADULT  Goal: Performs mobility at highest level of function for planned discharge setting.  See evaluation for individualized goals.  Description: Treatment/Interventions: Functional transfer training, LE strengthening/ROM, Elevations, Therapeutic exercise, Endurance training, Patient/family training, Equipment eval/education, Bed mobility, Compensatory technique education, Gait training, Spoke to nursing, OT  Equipment Recommended: Walker       See flowsheet documentation for full assessment, interventions and recommendations.  Note: Prognosis: Excellent  Problem List: Decreased strength, Decreased range of motion, Decreased endurance, Impaired balance, Decreased mobility, Decreased safety awareness, Pain, Decreased cognition  Assessment: Pt is 69 y.o. female seen for a PT evaluation s/p admit to [facility] on 12/16/2024. Pt presenting w/ principal dx of CSF otorrhea, now s/p craniotomy middle fossa 12/17/24. Please see above for other active problem list / PMH. PT now consulted to assess functional mobility and needs for safe d/c planning. Prior to admission, pt was I w/ ambulation w/o AD. Currently pt requires [place] for bed skills; [place] for functional transfers; [place] for ambulation w/ RW. Pt presents functioning below baseline and w/ overall mobility deficits 2* to: decreased LE strength; generalized weakness/deconditioning; decreased endurance; impaired balance; gait deviations; reliance on more restrictive AD compared to baseline; fatigue; bed/chair alarms; multiple lines. These impairments place pt at risk for falls. Pt will continue to benefit from skilled PT interventions to address stated impairments; to maximize functional potential; for ongoing pt/ family training; and DME needs. PT is currently recommending HHPT.        Rehab Resource Intensity Level, PT: III (Minimum Resource Intensity)    See flowsheet documentation for full assessment.

## 2024-12-20 ENCOUNTER — APPOINTMENT (INPATIENT)
Dept: RADIOLOGY | Facility: HOSPITAL | Age: 69
DRG: 025 | End: 2024-12-20
Payer: COMMERCIAL

## 2024-12-20 LAB
ANION GAP SERPL CALCULATED.3IONS-SCNC: 8 MMOL/L (ref 4–13)
ANION GAP SERPL CALCULATED.3IONS-SCNC: 8 MMOL/L (ref 4–13)
BASOPHILS # BLD AUTO: 0.03 THOUSANDS/ÂΜL (ref 0–0.1)
BASOPHILS NFR BLD AUTO: 0 % (ref 0–1)
BUN SERPL-MCNC: 10 MG/DL (ref 5–25)
BUN SERPL-MCNC: 10 MG/DL (ref 5–25)
CALCIUM SERPL-MCNC: 8.6 MG/DL (ref 8.4–10.2)
CALCIUM SERPL-MCNC: 8.8 MG/DL (ref 8.4–10.2)
CHLORIDE SERPL-SCNC: 101 MMOL/L (ref 96–108)
CHLORIDE SERPL-SCNC: 103 MMOL/L (ref 96–108)
CO2 SERPL-SCNC: 28 MMOL/L (ref 21–32)
CO2 SERPL-SCNC: 28 MMOL/L (ref 21–32)
CREAT SERPL-MCNC: 0.41 MG/DL (ref 0.6–1.3)
CREAT SERPL-MCNC: 0.41 MG/DL (ref 0.6–1.3)
DME PARACHUTE DELIVERY DATE ACTUAL: NORMAL
DME PARACHUTE DELIVERY DATE REQUESTED: NORMAL
DME PARACHUTE ITEM DESCRIPTION: NORMAL
DME PARACHUTE ORDER STATUS: NORMAL
DME PARACHUTE SUPPLIER NAME: NORMAL
DME PARACHUTE SUPPLIER PHONE: NORMAL
EOSINOPHIL # BLD AUTO: 0.02 THOUSAND/ÂΜL (ref 0–0.61)
EOSINOPHIL NFR BLD AUTO: 0 % (ref 0–6)
ERYTHROCYTE [DISTWIDTH] IN BLOOD BY AUTOMATED COUNT: 13.4 % (ref 11.6–15.1)
GFR SERPL CREATININE-BSD FRML MDRD: 106 ML/MIN/1.73SQ M
GFR SERPL CREATININE-BSD FRML MDRD: 106 ML/MIN/1.73SQ M
GLUCOSE SERPL-MCNC: 122 MG/DL (ref 65–140)
GLUCOSE SERPL-MCNC: 124 MG/DL (ref 65–140)
HCT VFR BLD AUTO: 33.3 % (ref 34.8–46.1)
HGB BLD-MCNC: 11.4 G/DL (ref 11.5–15.4)
IMM GRANULOCYTES # BLD AUTO: 0.13 THOUSAND/UL (ref 0–0.2)
IMM GRANULOCYTES NFR BLD AUTO: 1 % (ref 0–2)
LYMPHOCYTES # BLD AUTO: 2.54 THOUSANDS/ÂΜL (ref 0.6–4.47)
LYMPHOCYTES NFR BLD AUTO: 23 % (ref 14–44)
MAGNESIUM SERPL-MCNC: 1.9 MG/DL (ref 1.9–2.7)
MCH RBC QN AUTO: 30.4 PG (ref 26.8–34.3)
MCHC RBC AUTO-ENTMCNC: 34.2 G/DL (ref 31.4–37.4)
MCV RBC AUTO: 89 FL (ref 82–98)
MONOCYTES # BLD AUTO: 1.07 THOUSAND/ÂΜL (ref 0.17–1.22)
MONOCYTES NFR BLD AUTO: 10 % (ref 4–12)
NEUTROPHILS # BLD AUTO: 7.48 THOUSANDS/ÂΜL (ref 1.85–7.62)
NEUTS SEG NFR BLD AUTO: 66 % (ref 43–75)
NRBC BLD AUTO-RTO: 0 /100 WBCS
PHOSPHATE SERPL-MCNC: 2.4 MG/DL (ref 2.3–4.1)
PLATELET # BLD AUTO: 210 THOUSANDS/UL (ref 149–390)
PMV BLD AUTO: 9.5 FL (ref 8.9–12.7)
POTASSIUM SERPL-SCNC: 3.3 MMOL/L (ref 3.5–5.3)
POTASSIUM SERPL-SCNC: 3.8 MMOL/L (ref 3.5–5.3)
RBC # BLD AUTO: 3.75 MILLION/UL (ref 3.81–5.12)
SODIUM SERPL-SCNC: 137 MMOL/L (ref 135–147)
SODIUM SERPL-SCNC: 139 MMOL/L (ref 135–147)
WBC # BLD AUTO: 11.27 THOUSAND/UL (ref 4.31–10.16)

## 2024-12-20 PROCEDURE — NC001 PR NO CHARGE: Performed by: EMERGENCY MEDICINE

## 2024-12-20 PROCEDURE — 99024 POSTOP FOLLOW-UP VISIT: CPT | Performed by: NEUROLOGICAL SURGERY

## 2024-12-20 PROCEDURE — 80048 BASIC METABOLIC PNL TOTAL CA: CPT

## 2024-12-20 PROCEDURE — 99233 SBSQ HOSP IP/OBS HIGH 50: CPT | Performed by: EMERGENCY MEDICINE

## 2024-12-20 PROCEDURE — 70450 CT HEAD/BRAIN W/O DYE: CPT

## 2024-12-20 PROCEDURE — 85025 COMPLETE CBC W/AUTO DIFF WBC: CPT

## 2024-12-20 PROCEDURE — 84100 ASSAY OF PHOSPHORUS: CPT

## 2024-12-20 PROCEDURE — 83735 ASSAY OF MAGNESIUM: CPT

## 2024-12-20 RX ORDER — DEXAMETHASONE 2 MG/1
2 TABLET ORAL EVERY 12 HOURS SCHEDULED
Status: DISCONTINUED | OUTPATIENT
Start: 2024-12-27 | End: 2024-12-21 | Stop reason: HOSPADM

## 2024-12-20 RX ORDER — HEPARIN SODIUM 5000 [USP'U]/ML
5000 INJECTION, SOLUTION INTRAVENOUS; SUBCUTANEOUS EVERY 8 HOURS SCHEDULED
Status: DISCONTINUED | OUTPATIENT
Start: 2024-12-20 | End: 2024-12-21 | Stop reason: HOSPADM

## 2024-12-20 RX ORDER — DEXAMETHASONE 2 MG/1
2 TABLET ORAL EVERY 8 HOURS SCHEDULED
Status: DISCONTINUED | OUTPATIENT
Start: 2024-12-26 | End: 2024-12-21 | Stop reason: HOSPADM

## 2024-12-20 RX ORDER — MAGNESIUM SULFATE HEPTAHYDRATE 40 MG/ML
2 INJECTION, SOLUTION INTRAVENOUS ONCE
Status: COMPLETED | OUTPATIENT
Start: 2024-12-20 | End: 2024-12-20

## 2024-12-20 RX ORDER — POTASSIUM CHLORIDE 14.9 MG/ML
20 INJECTION INTRAVENOUS
Status: COMPLETED | OUTPATIENT
Start: 2024-12-20 | End: 2024-12-20

## 2024-12-20 RX ORDER — DEXAMETHASONE 4 MG/1
4 TABLET ORAL EVERY 8 HOURS SCHEDULED
Status: DISCONTINUED | OUTPATIENT
Start: 2024-12-22 | End: 2024-12-21 | Stop reason: HOSPADM

## 2024-12-20 RX ORDER — DEXAMETHASONE SODIUM PHOSPHATE 4 MG/ML
4 INJECTION, SOLUTION INTRA-ARTICULAR; INTRALESIONAL; INTRAMUSCULAR; INTRAVENOUS; SOFT TISSUE EVERY 6 HOURS SCHEDULED
Status: DISCONTINUED | OUTPATIENT
Start: 2024-12-20 | End: 2024-12-20

## 2024-12-20 RX ORDER — DEXAMETHASONE 2 MG/1
2 TABLET ORAL DAILY
Status: DISCONTINUED | OUTPATIENT
Start: 2024-12-30 | End: 2024-12-21 | Stop reason: HOSPADM

## 2024-12-20 RX ORDER — DEXAMETHASONE 4 MG/1
4 TABLET ORAL EVERY 6 HOURS SCHEDULED
Status: DISCONTINUED | OUTPATIENT
Start: 2024-12-20 | End: 2024-12-21 | Stop reason: HOSPADM

## 2024-12-20 RX ORDER — DEXAMETHASONE 2 MG/1
2 TABLET ORAL EVERY 6 HOURS SCHEDULED
Status: DISCONTINUED | OUTPATIENT
Start: 2024-12-23 | End: 2024-12-21 | Stop reason: HOSPADM

## 2024-12-20 RX ORDER — LOSARTAN POTASSIUM 50 MG/1
100 TABLET ORAL DAILY
Status: DISCONTINUED | OUTPATIENT
Start: 2024-12-20 | End: 2024-12-21 | Stop reason: HOSPADM

## 2024-12-20 RX ORDER — CEFAZOLIN SODIUM 1 G/50ML
1000 SOLUTION INTRAVENOUS EVERY 8 HOURS
Status: COMPLETED | OUTPATIENT
Start: 2024-12-21 | End: 2024-12-21

## 2024-12-20 RX ADMIN — HYDROMORPHONE HYDROCHLORIDE 0.2 MG: 0.2 INJECTION, SOLUTION INTRAMUSCULAR; INTRAVENOUS; SUBCUTANEOUS at 07:44

## 2024-12-20 RX ADMIN — POTASSIUM CHLORIDE 20 MEQ: 14.9 INJECTION, SOLUTION INTRAVENOUS at 01:42

## 2024-12-20 RX ADMIN — PROMETHAZINE HYDROCHLORIDE 12.5 MG: 25 INJECTION INTRAMUSCULAR; INTRAVENOUS at 04:39

## 2024-12-20 RX ADMIN — POTASSIUM CHLORIDE 20 MEQ: 14.9 INJECTION, SOLUTION INTRAVENOUS at 03:45

## 2024-12-20 RX ADMIN — TRIMETHOBENZAMIDE HYDROCHLORIDE 200 MG: 100 INJECTION INTRAMUSCULAR at 07:45

## 2024-12-20 RX ADMIN — LEVETIRACETAM 500 MG: 100 INJECTION, SOLUTION INTRAVENOUS at 08:35

## 2024-12-20 RX ADMIN — LEVETIRACETAM 500 MG: 100 INJECTION, SOLUTION INTRAVENOUS at 21:17

## 2024-12-20 RX ADMIN — DEXAMETHASONE 4 MG: 4 TABLET ORAL at 21:17

## 2024-12-20 RX ADMIN — CEFAZOLIN SODIUM 1000 MG: 1 SOLUTION INTRAVENOUS at 00:17

## 2024-12-20 RX ADMIN — OXYCODONE HYDROCHLORIDE 5 MG: 5 TABLET ORAL at 00:50

## 2024-12-20 RX ADMIN — HYDROMORPHONE HYDROCHLORIDE 0.2 MG: 0.2 INJECTION, SOLUTION INTRAMUSCULAR; INTRAVENOUS; SUBCUTANEOUS at 17:12

## 2024-12-20 RX ADMIN — DEXAMETHASONE SODIUM PHOSPHATE 4 MG: 4 INJECTION INTRA-ARTICULAR; INTRALESIONAL; INTRAMUSCULAR; INTRAVENOUS; SOFT TISSUE at 08:35

## 2024-12-20 RX ADMIN — PROMETHAZINE HYDROCHLORIDE 12.5 MG: 25 INJECTION INTRAMUSCULAR; INTRAVENOUS at 17:36

## 2024-12-20 RX ADMIN — MAGNESIUM SULFATE HEPTAHYDRATE 2 G: 40 INJECTION, SOLUTION INTRAVENOUS at 01:37

## 2024-12-20 RX ADMIN — DEXAMETHASONE SODIUM PHOSPHATE 4 MG: 4 INJECTION INTRA-ARTICULAR; INTRALESIONAL; INTRAMUSCULAR; INTRAVENOUS; SOFT TISSUE at 12:14

## 2024-12-20 RX ADMIN — ACETAMINOPHEN 1000 MG: 1000 INJECTION, SOLUTION INTRAVENOUS at 06:13

## 2024-12-20 RX ADMIN — FAMOTIDINE 20 MG: 10 INJECTION, SOLUTION INTRAVENOUS at 21:26

## 2024-12-20 RX ADMIN — DEXAMETHASONE SODIUM PHOSPHATE 4 MG: 4 INJECTION INTRA-ARTICULAR; INTRALESIONAL; INTRAMUSCULAR; INTRAVENOUS; SOFT TISSUE at 17:12

## 2024-12-20 RX ADMIN — SENNOSIDES AND DOCUSATE SODIUM 1 TABLET: 50; 8.6 TABLET ORAL at 21:17

## 2024-12-20 RX ADMIN — LOSARTAN POTASSIUM 100 MG: 50 TABLET, FILM COATED ORAL at 14:19

## 2024-12-20 RX ADMIN — PAROXETINE HYDROCHLORIDE 20 MG: 20 TABLET, FILM COATED ORAL at 08:38

## 2024-12-20 RX ADMIN — HEPARIN SODIUM 5000 UNITS: 5000 INJECTION, SOLUTION INTRAVENOUS; SUBCUTANEOUS at 21:17

## 2024-12-20 RX ADMIN — CEFAZOLIN SODIUM 1000 MG: 1 SOLUTION INTRAVENOUS at 07:47

## 2024-12-20 RX ADMIN — OXYCODONE HYDROCHLORIDE 5 MG: 5 TABLET ORAL at 21:18

## 2024-12-20 RX ADMIN — TRIMETHOBENZAMIDE HYDROCHLORIDE 200 MG: 100 INJECTION INTRAMUSCULAR at 22:32

## 2024-12-20 RX ADMIN — ACETAMINOPHEN 1000 MG: 1000 INJECTION, SOLUTION INTRAVENOUS at 13:56

## 2024-12-20 RX ADMIN — POLYETHYLENE GLYCOL 3350 17 G: 17 POWDER, FOR SOLUTION ORAL at 08:35

## 2024-12-20 RX ADMIN — POTASSIUM PHOSPHATE, MONOBASIC POTASSIUM PHOSPHATE, DIBASIC 12 MMOL: 224; 236 INJECTION, SOLUTION, CONCENTRATE INTRAVENOUS at 02:00

## 2024-12-20 RX ADMIN — FAMOTIDINE 20 MG: 10 INJECTION, SOLUTION INTRAVENOUS at 08:35

## 2024-12-20 RX ADMIN — CEFAZOLIN SODIUM 1000 MG: 1 SOLUTION INTRAVENOUS at 15:55

## 2024-12-20 NOTE — PROGRESS NOTES
I have personally seen and examined patient and reviewed all data with resident. Agree with note, assessment and plan. Critical care time 0 minutes. Please refer to attending comments below. Critical care time does not include procedures, family meeting or teaching.     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 and lumbar drain placement 12/16/2024  Pneumocephalus  Cephalgia  Hypertension  LOUISE  Anxiety/depression-continue Paxil    Exam:  Serosanguineous drainage from right ear, cranial nerves appear to be symmetric, no dysarthria or aphasia, decreased hearing in the right ear, moves all extremities and follows commands     Goal systolic blood pressure:  Normotensive with mean arterial pressure 65   mL/h    I/O +1.5 L  UO 5.9 L  Lumbar drain 10 mL    Devices:  12/16/2024 lumbar drain  12/16/2024 left arterial line    Cephalgia management:  Acetaminophen 1000 mg IV every 8 hours  Dilaudid 0.2 mg IV every 2 hours as needed  Oxycodone 2.5 mg / 5 mg every 4 hours as needed moderate/severe pain    Patient prophylaxis:  Keppra 500 mg IV every 12 hours end date 12/23/2024    Antimicrobial prophylaxis:  Ancef 1 g IV every 8 hours    Hypertension management:  Losartan 100 mg p.o. daily-currently held    Patient has been having headaches as well as nausea.  She reports that she was able to eat a small amount overnight.  She also reports her nausea is slightly improved.  She continues to have headache.  Vital signs reviewed from overnight.  Patient received Phenergan at 4:39 AM as well as Tigan at 7:45 AM for nausea.  Patient's heart rate slightly low in the 50s this morning with appropriate mean arterial pressure and systolic pressure.  Lumbar drain had been clamped.  CT head obtained this morning.  Final read is pending.  Laboratory data reviewed, sodium 137 slightly lower than prior.  Potassium is 3.8 which is improved from yesterday.  WBC count is improved.  All blood  glucoses less than 180.    Continue monitor patient's neurologic exam every 2 hours during the day and every 4 hours at night.  Maintain lumbar drain clamped as per neurosurgery.  Repeat imaging pending.  If patient has new focality to exam or decrease in GCS by greater than 2 points plan for emergent reimaging.  Patient is requiring occasional oxycodone as well as Dilaudid for her pain.  She is on scheduled Tylenol.  If her nausea is improved transition Tylenol to p.o.    Update: 8:27 AM    Communication from neurosurgery is plan to remove lumbar drain today.  Decadron was initiated secondary to cerebral edema and area of defect repair.  Will plan to discontinue antibiotics after drain is removed.    Update: 149pm    Lab holiday    KCL 20 meq    Restart losartan    Hold HCTZ    Decrease IVF 50ml/hr, ate oatmeal this am. If continues to tolerate po discontinue IVF    Discontinue arterial line    Transfer to neurosurgery

## 2024-12-20 NOTE — PROGRESS NOTES
Progress Note - Critical Care/ICU   Name: Anusha Mchugh 69 y.o. female I MRN: [id number]  Unit/Bed#: ICU 07 I Date of Admission: 12/16/2024   Date of Service: 12/20/2024 I Hospital Day: 4       Assessment & Plan   Neuro:   Diagnosis: POD 4 s/p right endoscopic middle fossa craniotomy for repair of CSF leak from tegmentum defect and lumbar drain placement with Dr. Borjas  Postop pneumocephalus    Plan: Neurosurgery and ENT on board  Right otowick removed on 12/18  Lumbar drain clamped on 12/19.  Potential removal on 12/20 (subQ hep held) and d/c on 12/21  Pending repeat CT head read this a.m.  Keppra 500 Q12H for 1 week for seizure prophylaxis  Ancef while lumbar drain in place  Headache and nausea controlled  IV Tylenol scheduled Q8-unable to tolerate p.o.  As needed Dilaudid 0.2 mg every 2 hours, oxy 2.5 or 5 mg every 4 hours  Continue paroxetine 20 mg daily      CV:   Diagnosis: Chronic HTN  Pressure controlled without meds  Plan: Hold valsartan-hydrochlorothiazide 160-12.5 mg daily    Pulm:  Diagnosis: Mild intermittent asthma  LOUISE  Plan: Home albuterol 2 puff every 6 hours as needed  Loratadine 10 mg as needed  Hold CPAP status post recent craniotomy    GI:   Diagnosis: Postoperative nausea/vomiting  .  GERD  Plan: Phenergan 12.5 mg every 6 hours, Tigan 200 mg IM every 6 hours  Will attempt transition to p.o. meds if tolerable  Continue home famotidine 20 mg every 12 hours    :   No active issues    F/E/N:   Fluids: LR 125ml/h, will continue due to patient's continued episodes of vomiting  Electrolytes: Replete as needed; K+ >4, Phos >3, Mg >2.   Nutrition: Regular diet, bowel regimen in place-MiraLAX and senna docusate  No BM so far    Heme/Onc:   No active issues    Endo:   No active issues    ID:   No active issues    MSK/Skin:   No active issues    Lines: Peripheral IV, left radial A-line  Drains: Lumbar drain clamped    Disposition: Critical care    ICU Core Measures     A: Assess, Prevent, and Manage  Pain Has pain been assessed? Yes  Need for changes to pain regimen? No   B: Both SAT/SAT  N/A   C: Choice of Sedation RASS Goal: 0 Alert and Calm  Need for changes to sedation or analgesia regimen? No   D: Delirium CAM-ICU: Negative   E: Early Mobility  Plan for early mobility? Yes   F: Family Engagement Plan for family engagement today? Yes       Antibiotic Review: Patient on appropriate coverage based on culture data.     Review of Invasive Devices:        Elida Plan: Keep arterial line for hemodynamic monitoring, frequent ABGs, and frequent labs    Prophylaxis:  VTE VTE covered by:    None       Stress Ulcer  covered byFamotidine (PF) (PEPCID) injection 20 mg [[id number]], famotidine (PEPCID) 20 mg tablet [[id number]] (Long-Term Med)         Summary  61-year-old female with HTN, LOUISE, anxiety/depression was admitted on 12/16/2024 for elective endoscopic right middle fossa craniotomy for repair of tegmen defect and CSF leak with temporalis fascia or muscle flap with neuromonitoring cranial nerve VII/XVIII in preoperative lumbar drain placement    24 Hour Events : No acute event overnight.  Patient reports that she was able to eat chicken broth.  Feels less nauseous today.  Still has head pain.    Subjective   Review of Systems: Review of Systems   Constitutional:  Negative for chills and fever.   HENT:  Positive for ear discharge and hearing loss (Right muffled hearing). Negative for ear pain and sore throat.    Eyes:  Negative for pain and visual disturbance.   Respiratory:  Negative for cough and shortness of breath.    Cardiovascular:  Negative for chest pain and palpitations.   Gastrointestinal:  Positive for nausea. Negative for abdominal pain and vomiting.   Genitourinary:  Negative for dysuria and hematuria.   Musculoskeletal:  Negative for arthralgias and back pain.   Skin:  Negative for color change and rash.   Neurological:  Positive for headaches. Negative for seizures and syncope.   All other systems  reviewed and are negative.      Objective :                   Vitals I/O      Most Recent Min/Max in 24hrs   Temp 98.2 °F (36.8 °C) Temp  Min: 98.2 °F (36.8 °C)  Max: 98.5 °F (36.9 °C)   Pulse (!) 54 Pulse  Min: 50  Max: 70   Resp 19 Resp  Min: 16  Max: 23   BP (!) 171/76 No data recorded   O2 Sat 93 % SpO2  Min: 92 %  Max: 98 %      Intake/Output Summary (Last 24 hours) at 12/20/2024 0823  Last data filed at 12/20/2024 0724  Gross per 24 hour   Intake 4595 ml   Output 5900 ml   Net -1305 ml       Diet Surgical; Full Liquid    Invasive Monitoring   Arterial Line  [place] /76  Arterial Line BP  Min: 96/84  Max: 236/60   MAP 94 mmHg  Arterial Line MAP (mmHg)  Min: 84 mmHg  Max: 118 mmHg     Neuro Invasive Monitoring   Most Recent  Min/Max in 24hrs    GCS 15  [place] Coma Scale Score  Min: 15  Max: 15    ICP     No data recorded    CPP (cuff)    No data recorded    CPP(yinka)    No data recorded   CVP   No data recorded           Physical Exam   Physical Exam  Eyes:      Extraocular Movements: Extraocular movements intact.      Pupils: Pupils are equal, round, and reactive to light.   Skin:     General: Skin is warm.   HENT:      Head: Normocephalic.      Right Ear: Drainage (Serosanguineous drainage) present.      Mouth/Throat:      Mouth: Mucous membranes are moist.   Cardiovascular:      Rate and Rhythm: Bradycardia present.      Pulses: Normal pulses.   Abdominal:      Palpations: Abdomen is soft.   Constitutional:       Appearance: She is well-developed.   Pulmonary:      Effort: Pulmonary effort is normal.   Neurological:      General: No focal deficit present.      Mental Status: Mental status is at baseline.      Sensory: Sensation is intact.      Motor: gross motor function is at baseline for patient.      Comments: Right muffled hearing.  Brisk reflexes throughout          Diagnostic Studies        Lab Results: I have reviewed the following results:     Medications:  Scheduled PRN   acetaminophen, 1,000  mg, Q8H TD  cefazolin, 1,000 mg, Q8H  dexamethasone, 4 mg, Q6H TD  famotidine, 20 mg, Q12H TD  levETIRAcetam, 500 mg, Q12H TD  [Held by provider] losartan, 100 mg, Daily  PARoxetine, 20 mg, Daily  polyethylene glycol, 17 g, Daily  senna-docusate sodium, 1 tablet, HS      albuterol, 2 puff, Q6H PRN  calcium carbonate, 1,000 mg, Daily PRN  HYDROmorphone, 0.2 mg, Q2H PRN  loratadine, 10 mg, Daily PRN  oxyCODONE, 2.5 mg, Q4H PRN   Or  oxyCODONE, 5 mg, Q4H PRN  promethazine, 12.5 mg, Q6H PRN  trimethobenzamide, 200 mg, Q6H PRN       Continuous    lactated ringers, 125 mL/hr, Last Rate: 125 mL/hr (12/19/24 1516)         Labs:   CBC    Recent Labs     12/19/24  0622 12/20/24  0030   WBC 14.63* 11.27*   HGB 11.3* 11.4*   HCT 34.3* 33.3*    210     BMP    Recent Labs     12/20/24  0030 12/20/24  0633   SODIUM 139 137   K 3.3* 3.8    101   CO2 28 28   AGAP 8 8   BUN 10 10   CREATININE 0.41* 0.41*   CALCIUM 8.8 8.6       Coags    No recent results     Additional Electrolytes  Recent Labs     12/19/24  0622 12/20/24  0030   MG 2.0 1.9   PHOS 1.7* 2.4          Blood Gas    No recent results  No recent results LFTs  No recent results    Infectious  No recent results  Glucose  Recent Labs     12/19/24  0622 12/20/24  0030 12/20/24  0633   GLUC 136 122 124

## 2024-12-20 NOTE — PLAN OF CARE
Problem: PAIN - ADULT  Goal: Verbalizes/displays adequate comfort level or baseline comfort level  Description: Interventions:  - Encourage patient to monitor pain and request assistance  - Assess pain using appropriate pain scale  - Administer analgesics based on type and severity of pain and evaluate response  - Implement non-pharmacological measures as appropriate and evaluate response  - Consider cultural and social influences on pain and pain management  - Notify physician/advanced practitioner if interventions unsuccessful or patient reports new pain  Outcome: Progressing     Problem: INFECTION - ADULT  Goal: Absence or prevention of progression during hospitalization  Description: INTERVENTIONS:  - Assess and monitor for signs and symptoms of infection  - Monitor lab/diagnostic results  - Monitor all insertion sites, i.e. indwelling lines, tubes, and drains  - Monitor endotracheal if appropriate and nasal secretions for changes in amount and color  - [place] appropriate cooling/warming therapies per order  - Administer medications as ordered  - Instruct and encourage patient and family to use good hand hygiene technique  - Identify and instruct in appropriate isolation precautions for identified infection/condition  Outcome: Progressing  Goal: Absence of fever/infection during neutropenic period  Description: INTERVENTIONS:  - Monitor WBC    Outcome: Progressing     Problem: SAFETY ADULT  Goal: Patient will remain free of falls  Description: INTERVENTIONS:  - Educate patient/family on patient safety including physical limitations  - Instruct patient to call for assistance with activity   - Consult OT/PT to assist with strengthening/mobility   - Keep Call bell within reach  - Keep bed low and locked with side rails adjusted as appropriate  - Keep care items and personal belongings within reach  - Initiate and maintain comfort rounds  - Make Fall Risk Sign visible to staff  - Offer Toileting every  Hours,  in advance of need  - Initiate/Maintain alarm  - Obtain necessary fall risk management equipment:   - Apply yellow socks and bracelet for high fall risk patients  - Consider moving patient to room near nurses station  Outcome: Progressing  Goal: Maintain or return to baseline ADL function  Description: INTERVENTIONS:  -  Assess patient's ability to carry out ADLs; assess patient's baseline for ADL function and identify physical deficits which impact ability to perform ADLs (bathing, care of mouth/teeth, toileting, grooming, dressing, etc.)  - Assess/evaluate cause of self-care deficits   - Assess range of motion  - Assess patient's mobility; develop plan if impaired  - Assess patient's need for assistive devices and provide as appropriate  - Encourage maximum independence but intervene and supervise when necessary  - Involve family in performance of ADLs  - Assess for home care needs following discharge   - Consider OT consult to assist with ADL evaluation and planning for discharge  - Provide patient education as appropriate  Outcome: Progressing  Goal: Maintains/Returns to pre admission functional level  Description: INTERVENTIONS:  - Perform AM-PAC 6 Click Basic Mobility/ Daily Activity assessment daily.  - Set and communicate daily mobility goal to care team and patient/family/caregiver.   - Collaborate with rehabilitation services on mobility goals if consulted  - Perform Range of Motion  times a day.  - Reposition patient every  hours.  - Dangle patient  times a day  - Stand patient  times a day  - Ambulate patient  times a day  - Out of bed to chair  times a day   - Out of bed for meals  times a day  - Out of bed for toileting  - Record patient progress and toleration of activity level   Outcome: Progressing     Problem: DISCHARGE PLANNING  Goal: Discharge to home or other facility with appropriate resources  Description: INTERVENTIONS:  - Identify barriers to discharge w/patient and caregiver  - Arrange for  needed discharge resources and transportation as appropriate  - Identify discharge learning needs (meds, wound care, etc.)  - Arrange for interpretive services to assist at discharge as needed  - Refer to Case Management Department for coordinating discharge planning if the patient needs post-hospital services based on physician/advanced practitioner order or complex needs related to functional status, cognitive ability, or social support system  Outcome: Progressing     Problem: Knowledge Deficit  Goal: Patient/family/caregiver demonstrates understanding of disease process, treatment plan, medications, and discharge instructions  Description: Complete learning assessment and assess knowledge base.  Interventions:  - Provide teaching at level of understanding  - Provide teaching via preferred learning methods  Outcome: Progressing     Problem: Prexisting or High Potential for Compromised Skin Integrity  Goal: Skin integrity is maintained or improved  Description: INTERVENTIONS:  - Identify patients at risk for skin breakdown  - Assess and monitor skin integrity  - Assess and monitor nutrition and hydration status  - Monitor labs   - Assess for incontinence   - Turn and reposition patient  - Assist with mobility/ambulation  - Relieve pressure over bony prominences  - Avoid friction and shearing  - Provide appropriate hygiene as needed including keeping skin clean and dry  - Evaluate need for skin moisturizer/barrier cream  - Collaborate with interdisciplinary team   - Patient/family teaching  - Consider wound care consult   Outcome: Progressing

## 2024-12-20 NOTE — PLAN OF CARE
Problem: PAIN - ADULT  Goal: Verbalizes/displays adequate comfort level or baseline comfort level  Description: Interventions:  - Encourage patient to monitor pain and request assistance  - Assess pain using appropriate pain scale  - Administer analgesics based on type and severity of pain and evaluate response  - Implement non-pharmacological measures as appropriate and evaluate response  - Consider cultural and social influences on pain and pain management  - Notify physician/advanced practitioner if interventions unsuccessful or patient reports new pain  Outcome: Progressing     Problem: INFECTION - ADULT  Goal: Absence or prevention of progression during hospitalization  Description: INTERVENTIONS:  - Assess and monitor for signs and symptoms of infection  - Monitor lab/diagnostic results  - Monitor all insertion sites, i.e. indwelling lines, tubes, and drains  - Monitor endotracheal if appropriate and nasal secretions for changes in amount and color  - [place] appropriate cooling/warming therapies per order  - Administer medications as ordered  - Instruct and encourage patient and family to use good hand hygiene technique  - Identify and instruct in appropriate isolation precautions for identified infection/condition  Outcome: Progressing  Goal: Absence of fever/infection during neutropenic period  Description: INTERVENTIONS:  - Monitor WBC    Outcome: Progressing     Problem: SAFETY ADULT  Goal: Patient will remain free of falls  Description: INTERVENTIONS:  - Educate patient/family on patient safety including physical limitations  - Instruct patient to call for assistance with activity   - Consult OT/PT to assist with strengthening/mobility   - Keep Call bell within reach  - Keep bed low and locked with side rails adjusted as appropriate  - Keep care items and personal belongings within reach  - Initiate and maintain comfort rounds  - Make Fall Risk Sign visible to staff  - Offer Toileting every  Hours, in  advance of need  - Initiate/Maintain alarm  - Obtain necessary fall risk management equipment:   - Apply yellow socks and bracelet for high fall risk patients  - Consider moving patient to room near nurses station  Outcome: Progressing  Goal: Maintain or return to baseline ADL function  Description: INTERVENTIONS:  -  Assess patient's ability to carry out ADLs; assess patient's baseline for ADL function and identify physical deficits which impact ability to perform ADLs (bathing, care of mouth/teeth, toileting, grooming, dressing, etc.)  - Assess/evaluate cause of self-care deficits   - Assess range of motion  - Assess patient's mobility; develop plan if impaired  - Assess patient's need for assistive devices and provide as appropriate  - Encourage maximum independence but intervene and supervise when necessary  - Involve family in performance of ADLs  - Assess for home care needs following discharge   - Consider OT consult to assist with ADL evaluation and planning for discharge  - Provide patient education as appropriate  Outcome: Progressing  Goal: Maintains/Returns to pre admission functional level  Description: INTERVENTIONS:  - Perform AM-PAC 6 Click Basic Mobility/ Daily Activity assessment daily.  - Set and communicate daily mobility goal to care team and patient/family/caregiver.   - Collaborate with rehabilitation services on mobility goals if consulted  - Perform Range of Motion  times a day.  - Reposition patient every  hours.  - Dangle patient  times a day  - Stand patient  times a day  - Ambulate patient  times a day  - Out of bed to chair  times a day   - Out of bed for meal times a day  - Out of bed for toileting  - Record patient progress and toleration of activity level   Outcome: Progressing     Problem: DISCHARGE PLANNING  Goal: Discharge to home or other facility with appropriate resources  Description: INTERVENTIONS:  - Identify barriers to discharge w/patient and caregiver  - Arrange for  needed discharge resources and transportation as appropriate  - Identify discharge learning needs (meds, wound care, etc.)  - Arrange for interpretive services to assist at discharge as needed  - Refer to Case Management Department for coordinating discharge planning if the patient needs post-hospital services based on physician/advanced practitioner order or complex needs related to functional status, cognitive ability, or social support system  Outcome: Progressing     Problem: Knowledge Deficit  Goal: Patient/family/caregiver demonstrates understanding of disease process, treatment plan, medications, and discharge instructions  Description: Complete learning assessment and assess knowledge base.  Interventions:  - Provide teaching at level of understanding  - Provide teaching via preferred learning methods  Outcome: Progressing     Problem: Prexisting or High Potential for Compromised Skin Integrity  Goal: Skin integrity is maintained or improved  Description: INTERVENTIONS:  - Identify patients at risk for skin breakdown  - Assess and monitor skin integrity  - Assess and monitor nutrition and hydration status  - Monitor labs   - Assess for incontinence   - Turn and reposition patient  - Assist with mobility/ambulation  - Relieve pressure over bony prominences  - Avoid friction and shearing  - Provide appropriate hygiene as needed including keeping skin clean and dry  - Evaluate need for skin moisturizer/barrier cream  - Collaborate with interdisciplinary team   - Patient/family teaching  - Consider wound care consult   Outcome: Progressing

## 2024-12-20 NOTE — CASE MANAGEMENT
Case Management Assessment    Patient name Anusha Mchugh  Location ICU 07/ICU 07 MRN [id number]  : 1955 Date 2024       Current Admission Date: 2024  Current Admission Diagnosis:CSF otorrhea   Patient Active Problem List    Diagnosis Date Noted Date Diagnosed    MORBID (SEVERE) OBESITY DUE TO EXCESS CALORIES 2024     Prediabetes 2024     Preop examination 2024     CSF otorrhea 2024     LOUISE (obstructive sleep apnea) 2024     Sleep apnea treated with continuous positive airway pressure (CPAP) 2024     Decreased hearing of right ear 2024     Primary hypertension 2023     Abnormal mammogram of right breast 2023     Allergies 2023     Vitamin D deficiency 2023     Anxiety and depression 2023     Generalized arthritis 2023     Mild intermittent asthma with acute exacerbation 2023     Gastroesophageal reflux disease without esophagitis 2023     History of bilateral knee arthroplasty 2023       LOS (days): 4  Geometric Mean LOS (GMLOS) (days): 1.6  Days to GMLOS:-2.4     OBJECTIVE:    Risk of Unplanned Readmission Score: 9.06         Current admission status: Inpatient       Preferred Pharmacy:   [facility]/pharmacy #[id number] [address] [address]  Phone: [phone] Fax: [phone]    Primary Care Provider: Juju Mustafa MD    Primary Insurance: [employer] [facility]  Secondary Insurance:     ASSESSMENT:  Active Health Care Proxies    There are no active Health Care Proxies on file.                 Readmission Root Cause  30 Day Readmission: No    Patient Information  Admitted from:: Home (Please note opening note was initially completed on 24 but did not transfer in computer.)  Mental Status: Alert, Other (Comment) (sleeping)  During Assessment patient was accompanied by: Daughter  Assessment information provided by:: Daughter  Primary  Caregiver: Self  Support Systems: Son, Daughter, Other (Comment) (1 son, 2 daughters)  County of Residence: [place]  What city do you live in?: [place]  Home entry access options. Select all that apply.: Stairs  Number of steps to enter home.: 3  Do the steps have railings?: Yes  Type of Current Residence: 2 story home  Upon entering residence, is there a bedroom on the main floor (no further steps)?: No  A bedroom is located on the following floor levels of residence (select all that apply):: 2nd Floor  Upon entering residence, is there a bathroom on the main floor (no further steps)?: No  Indicate which floors of current residence have a bathroom (select all the apply):: 2nd Floor  Number of steps to 2nd floor from main floor: One Flight  Living Arrangements: Lives w/ Daughter  Is patient a ?: No    Activities of Daily Living Prior to Admission  Functional Status: Independent  Completes ADLs independently?: Yes  Ambulates independently?: Yes  Does patient use assisted devices?: Yes  Assisted Devices (DME) used: CPAP, Nebulizer  Does patient currently own DME?: Yes  What DME does the patient currently own?: CPAP, Nebulizer  Does patient have a history of Outpatient Therapy (PT/OT)?: No  Does the patient have a history of Short-Term Rehab?: No  Does patient currently have HHC?: No    Current Home Health Care  Home Health Agency Name:: [facility]    Patient Information Continued  Income Source: Pension/long-term  Does patient have prescription coverage?: Yes  Does patient receive dialysis treatments?: No  Does patient have a history of substance abuse?: No  Does patient have a history of Mental Health Diagnosis?: Yes (anxiety and depression)  Is patient receiving treatment for mental health?:  (unknown)  Has patient received inpatient treatment related to mental health in the last 2 years?: No         Means of Transportation  Means of Transport to Appts:: Drives Self

## 2024-12-20 NOTE — PROGRESS NOTES
Progress Note - ENT   Name: Anusha Mchugh 69 y.o. female I MRN: [id number]  Unit/Bed#: ICU 07 I Date of Admission: 12/16/2024   Date of Service: 12/20/2024 I Hospital Day: 4     Date of Service: 12/20/2024 5:16 AM    HPI  Patient is a 69 y.o. 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    CT head ordered overnight for N/V. Final read pending.   AVSS on RA    PHYSICAL EXAM:  Vitals:    12/20/24 0500   BP:    Pulse: 56   Resp: 22   Temp:    SpO2: 95%        General: No acute distress, AOx4  HEENT: R ear incision c/d/I, edema improving. Otowick removed with expected serosanguinous drainage in the EAC. EOMi  Facial nerve intact in all branches.   Neurology: No focal deficits, nystagmus bilaterally  Lumbar drain in place  Lungs: Breathing easy, unlabored and even on room air  Cardio: well perfused      Intake/Output Summary (Last 24 hours) at 12/20/2024 0516  Last data filed at 12/20/2024 0200  Gross per 24 hour   Intake 4200 ml   Output 4180 ml   Net 20 ml         LABORATORY    Recent Labs     12/18/24  0504 12/19/24  0622 12/20/24  0030   WBC 20.92* 14.63* 11.27*   HGB 11.5 11.3* 11.4*   HCT 34.2* 34.3* 33.3*    203 210       Recent Labs     12/17/24  1402 12/17/24  2256 12/18/24  0504 12/19/24  0622 12/20/24  0030   K 3.3* 3.5 3.6 3.2* 3.3*    102 104 102 103   BUN 9 9 8 8 10   PHOS 1.5* 2.5 1.9* 1.7* 2.4   MG 2.6 2.3 2.3 2.0 1.9     UOP 1.4 cc/kg/hr    Patient Active Problem List   Diagnosis    Primary hypertension    Abnormal mammogram of right breast    Allergies    Vitamin D deficiency    Anxiety and depression    Generalized arthritis    Mild intermittent asthma with acute exacerbation    Gastroesophageal reflux  disease without esophagitis    History of bilateral knee arthroplasty    Sleep apnea treated with continuous positive airway pressure (CPAP)    Decreased hearing of right ear    LOUISE (obstructive sleep apnea)    Prediabetes    Preop examination    CSF otorrhea    MORBID (SEVERE) OBESITY DUE TO EXCESS CALORIES         ASSESSMENT  Patient is a 69 y.o. female w/ acute and chronic problems as above, who presents POD 2 s/p endoscopic right middle fossa craniotomy for repair of tegmen defect and CSF leak with temporalis fascia or muscle flap, with neuromonitoring (CN 7/8) and intraoperative lumbar drain placement, doing well.    PLAN  - lumbar drain management per NSGY  - R otowick removed  - Continued PO or IV antibiotics not needed from ENT perspective, no antibiotic drops to the ear   - rest of care per primary    Mana Mendoza M.D.  PGY-1  Otolaryngology, Head and Neck Surgery  Please contact ENT Resident role on Epic secure chat with any questions or concerns.

## 2024-12-20 NOTE — PROGRESS NOTES
Progress Note - Neurosurgery   Name: Anusha Mchugh 69 y.o. female I MRN: [id number]  Unit/Bed#: ICU 07 I Date of Admission: 12/16/2024   Date of Service: 12/20/2024 I Hospital Day: 4    Assessment & Plan  CSF otorrhea  POD 4 Right endoscopic middle fossa craniotomy for repair of tegmen defect and CSF leak with temporalis fascia or muscle flap, with neuromonitoring (CN 7/8) and intraoperative lumbar drain placement (Reji, 12/16/24)    Imaging:  CT head w/o, 12/20/2024:  Edema and small volume blood products in the right inferior temporal lobe overlying the operative site.  Slight worsening of pneumocephalus adjacent to the mastoidectomy.  Linear high attenuation along the tentorium along the right may reflect bone graft material.    Plan:  Continue to monitor neurological exam  STAT CT head w/o for decline in GCS greater than 2 points in 1 hour  Lumbar drain removed 12/20.    Will complete last dose of Ancef status post drain removal  Suture tied in place and will be removed during follow up appointment  ENT following, appreciate recommendations  Continues with some mild drainage from the ear  No antibiotic drops to ear  NCC consulted for ICU management, appreciate recommendations  SBP < 160  Medical management for post op nausea, PRN zofran and reglan ordered. Added Tigan and phenergan for continued post op nausea and vomiting  Post op labs reviewed. Expected leukocytosis 2/2 steroids and post op state  Decadron x48h for vasogenic edema  PPI while on steroids  Keppra x7d for seizure ppx  PT/OT evaluation, ok to mobilize  DVT ppx: SCDs, SQH TID (to end at MN for LD removal tomorrow)    Neurosurgery will continue to follow as primary team. Please call with questions or concerns.   Decreased hearing of right ear  Secondary to tegman defect, present on arrival  MORBID (SEVERE) OBESITY DUE TO EXCESS CALORIES  Recommend lifestyle modification     Please contact the SecureChat role for the Neurosurgery service  with any questions/concerns.    Subjective   Laying in bed in no acute distress.  Patient states this morning her nausea did improve.  Still has a generalized woozy feeling.  Lumbar drain removed without complication.    Objective :  Temp:  [98.2 °F (36.8 °C)-98.5 °F (36.9 °C)] 98.3 °F (36.8 °C)  HR:  [50-84] 84  Resp:  [16-23] 18  SpO2:  [92 %-98 %] 97 %  O2 Device: None (Room air)    I/O         12/18 0701 12/19 0700 12/19 0701 12/20 0700 12/20 0701 12/21 0700    P.O. 120 360     I.V. (mL/kg) 3002.1 (30.1) 3120 (31.3) 530 (5.3)    IV Piggyback 700 1465 50    Total Intake(mL/kg) 3822.1 (38.3) 4945 (49.5) 580 (5.8)    Urine (mL/kg/hr) 3900 (1.6) 5950 (2.5) 1300 (2.6)    Drains 230 10     Total Output 4130 5960 1300    Net -307.9 -1015 -720                 Physical Exam  Constitutional:       Appearance: Normal appearance. She is well-developed.   HENT:      Head: Normocephalic and atraumatic.      Ears:      Comments: Unable to hear from the R ear.   Eyes:      Extraocular Movements: Extraocular movements intact.   Neck:      Trachea: No tracheal deviation.   Pulmonary:      Effort: Pulmonary effort is normal. No respiratory distress.   Musculoskeletal:         General: Normal range of motion.      Cervical back: Normal range of motion.      Comments: Single suture placed at lumbar drain exit site   Skin:     General: Skin is warm and dry.   Neurological:      Mental Status: She is alert and oriented to person, place, and time.      Cranial Nerves: No cranial nerve deficit.      Sensory: No sensory deficit.      Motor: No weakness.   Psychiatric:         Behavior: Behavior normal.         Thought Content: Thought content normal.         Lab Results: I have reviewed the following results:  Recent Labs     12/20/24  0030 12/20/24  0633   WBC 11.27*  --    HGB 11.4*  --    HCT 33.3*  --      --    SODIUM 139 137   K 3.3* 3.8    101   CO2 28 28   BUN 10 10   CREATININE 0.41* 0.41*   GLUC 122 124   MG 1.9   --    PHOS 2.4  --        Imaging Results Review: I personally reviewed the following image studies in PACS and associated radiology reports: CT head. My interpretation of the radiology images/reports is: See above.  Other Study Results Review: No additional pertinent studies reviewed.    VTE Pharmacologic Prophylaxis: Sequential compression device (Venodyne)

## 2024-12-20 NOTE — CASE MANAGEMENT
Case Management Discharge Planning Note    Patient name Anusha Mchugh  Location ICU 07/ICU 07 MRN [id number]  : 1955 Date 2024       Current Admission Date: 2024  Current Admission Diagnosis:CSF otorrhea   Patient Active Problem List    Diagnosis Date Noted Date Diagnosed    MORBID (SEVERE) OBESITY DUE TO EXCESS CALORIES 2024     Prediabetes 2024     Preop examination 2024     CSF otorrhea 2024     OLUISE (obstructive sleep apnea) 2024     Sleep apnea treated with continuous positive airway pressure (CPAP) 2024     Decreased hearing of right ear 2024     Primary hypertension 2023     Abnormal mammogram of right breast 2023     Allergies 2023     Vitamin D deficiency 2023     Anxiety and depression 2023     Generalized arthritis 2023     Mild intermittent asthma with acute exacerbation 2023     Gastroesophageal reflux disease without esophagitis 2023     History of bilateral knee arthroplasty 2023       LOS (days): 4  Geometric Mean LOS (GMLOS) (days): 1.6  Days to GMLOS:-2.7     OBJECTIVE:  Risk of Unplanned Readmission Score: 9.08         Current admission status: Inpatient   Preferred Pharmacy:   [facility]/pharmacy #[id number] [place] [address]  Phone: [phone] Fax: [phone]    Primary Care Provider: Juju Mustafa MD    Primary Insurance: [employer] [facility]  Secondary Insurance:     DISCHARGE DETAILS:                                                                                                 Additional Comments: 68yo female remains in ICU POD#4 craniotomy. CT of head today with some changes. Lumbar drain removed. Nausea impoving slightly, still has headache. Ear drain also removed. Has [facility] reserved for discharge.

## 2024-12-20 NOTE — ASSESSMENT & PLAN NOTE
POD 4 Right endoscopic middle fossa craniotomy for repair of tegmen defect and CSF leak with temporalis fascia or muscle flap, with neuromonitoring (CN 7/8) and intraoperative lumbar drain placement (Indio/Kinza, 12/16/24)    Imaging:  CT head w/o, 12/20/2024:  Edema and small volume blood products in the right inferior temporal lobe overlying the operative site.  Slight worsening of pneumocephalus adjacent to the mastoidectomy.  Linear high attenuation along the tentorium along the right may reflect bone graft material.    Plan:  Continue to monitor neurological exam  STAT CT head w/o for decline in GCS greater than 2 points in 1 hour  Lumbar drain removed 12/20.    Will complete last dose of Ancef status post drain removal  Suture tied in place and will be removed during follow up appointment  ENT following, appreciate recommendations  Continues with some mild drainage from the ear  No antibiotic drops to ear  NCC consulted for ICU management, appreciate recommendations  SBP < 160  Medical management for post op nausea, PRN zofran and reglan ordered. Added Tigan and phenergan for continued post op nausea and vomiting  Post op labs reviewed. Expected leukocytosis 2/2 steroids and post op state  Decadron x48h for vasogenic edema  PPI while on steroids  Keppra x7d for seizure ppx  PT/OT evaluation, ok to mobilize  DVT ppx: SCDs, SQH TID (to end at MN for LD removal tomorrow)    Neurosurgery will continue to follow as primary team. Please call with questions or concerns.

## 2024-12-21 VITALS
HEIGHT: 62 IN | DIASTOLIC BLOOD PRESSURE: 81 MMHG | TEMPERATURE: 98.4 F | WEIGHT: 219.69 LBS | BODY MASS INDEX: 40.43 KG/M2 | HEART RATE: 72 BPM | SYSTOLIC BLOOD PRESSURE: 146 MMHG | RESPIRATION RATE: 16 BRPM | OXYGEN SATURATION: 94 %

## 2024-12-21 PROCEDURE — 99024 POSTOP FOLLOW-UP VISIT: CPT | Performed by: NEUROLOGICAL SURGERY

## 2024-12-21 RX ORDER — LEVETIRACETAM 500 MG/5ML
500 INJECTION, SOLUTION, CONCENTRATE INTRAVENOUS EVERY 12 HOURS SCHEDULED
Qty: 5 ML | Refills: 0 | Status: SHIPPED | OUTPATIENT
Start: 2024-12-21 | End: 2024-12-21

## 2024-12-21 RX ORDER — DEXAMETHASONE 2 MG/1
TABLET ORAL
Qty: 40 TABLET | Refills: 0 | Status: SHIPPED | OUTPATIENT
Start: 2024-12-21 | End: 2025-01-01

## 2024-12-21 RX ORDER — OXYCODONE HYDROCHLORIDE 5 MG/1
2.5-5 TABLET ORAL EVERY 4 HOURS PRN
Qty: 30 TABLET | Refills: 0 | Status: SHIPPED | OUTPATIENT
Start: 2024-12-21 | End: 2024-12-31

## 2024-12-21 RX ORDER — LEVETIRACETAM 500 MG/1
500 TABLET ORAL EVERY 12 HOURS SCHEDULED
Qty: 4 TABLET | Refills: 0 | Status: SHIPPED | OUTPATIENT
Start: 2024-12-21 | End: 2024-12-23

## 2024-12-21 RX ORDER — ONDANSETRON 4 MG/1
4 TABLET, FILM COATED ORAL EVERY 8 HOURS PRN
Qty: 20 TABLET | Refills: 0 | Status: SHIPPED | OUTPATIENT
Start: 2024-12-21

## 2024-12-21 RX ADMIN — DEXAMETHASONE 4 MG: 4 TABLET ORAL at 13:02

## 2024-12-21 RX ADMIN — HEPARIN SODIUM 5000 UNITS: 5000 INJECTION, SOLUTION INTRAVENOUS; SUBCUTANEOUS at 05:11

## 2024-12-21 RX ADMIN — PAROXETINE HYDROCHLORIDE 20 MG: 20 TABLET, FILM COATED ORAL at 09:02

## 2024-12-21 RX ADMIN — POLYETHYLENE GLYCOL 3350 17 G: 17 POWDER, FOR SOLUTION ORAL at 09:02

## 2024-12-21 RX ADMIN — DEXAMETHASONE 4 MG: 4 TABLET ORAL at 05:11

## 2024-12-21 RX ADMIN — OXYCODONE HYDROCHLORIDE 5 MG: 5 TABLET ORAL at 13:02

## 2024-12-21 RX ADMIN — LEVETIRACETAM 500 MG: 100 INJECTION, SOLUTION INTRAVENOUS at 09:02

## 2024-12-21 RX ADMIN — FAMOTIDINE 20 MG: 10 INJECTION, SOLUTION INTRAVENOUS at 09:03

## 2024-12-21 RX ADMIN — LOSARTAN POTASSIUM 100 MG: 50 TABLET, FILM COATED ORAL at 09:02

## 2024-12-21 RX ADMIN — ACETAMINOPHEN 1000 MG: 1000 INJECTION, SOLUTION INTRAVENOUS at 00:15

## 2024-12-21 RX ADMIN — CEFAZOLIN SODIUM 1000 MG: 1 SOLUTION INTRAVENOUS at 00:15

## 2024-12-21 RX ADMIN — ACETAMINOPHEN 1000 MG: 1000 INJECTION, SOLUTION INTRAVENOUS at 05:11

## 2024-12-21 NOTE — DISCHARGE SUMMARY
Discharge Summary - Neurosurgery   Name: Anusha Mchugh 69 y.o. female I MRN: [id number]  Unit/Bed#: [facility] 721-01 I Date of Admission: 12/16/2024   Date of Service: 12/21/2024 I Hospital Day: 5      POD 5 Right endoscopic middle fossa craniotomy for repair of tegmen defect and CSF leak with temporalis fascia or muscle flap, with neuromonitoring (CN 7/8) and intraoperative lumbar drain placement (Reji, 12/16/24)    Imaging:  CT head w/o, 12/20/2024: Edema and small volume blood products in the right inferior temporal lobe overlying the operative site.  Slight worsening of pneumocephalus adjacent to the mastoidectomy.  Linear high attenuation along the tentorium along the right may reflect bone graft material.    Plan:  Continue to monitor neurological exam  STAT CT head w/o for decline in GCS greater than 2 points in 1 hour  Lumbar drain removed 12/20.    Will complete last dose of Ancef status post drain removal  Suture tied in place and will be removed during follow up appointment  ENT following, appreciate recommendations  Continues with some drainage from the ear - ear wick removed today by ENT  No antibiotic drops to ear  Medicine:  SBP < 160  Medical management for post op nausea, PRN zofran and reglan ordered. Added Tigan and phenergan for continued post op nausea and vomiting  Post op labs reviewed. Expected leukocytosis 2/2 steroids and post op state  Decadron x48h for vasogenic edema  PPI while on steroids  Keppra x7d for seizure ppx  PT/OT evaluation, ok to mobilize  DVT ppx: SCDs, SQH TID.     Neurosurgery will continue to follow as primary team, plan to discharge home today with scheduled follow up in about 2 weeks. Please call with questions or concerns.     Neurologic Exam     Mental Status   Oriented to person, place, and time.   Oriented to person.   Oriented to place.   Oriented to time.   Registration: recalls 3 of 3 objects. Follows 1 step commands.   Attention: normal. Concentration:  normal.   Speech: speech is normal   Level of consciousness: alert  Knowledge: good and consistent with education. Able to perform simple calculations.   Able to name object.     Cranial Nerves     CN II   Visual acuity: normal  Right visual field deficit: none  Left visual field deficit: none     CN III, IV, VI   Pupils are equal, round, and reactive to light.  Extraocular motions are normal.   Right pupil: Size: 3 mm. Shape: regular. Reactivity: brisk. Consensual response: intact. Accommodation: intact.   Left pupil: Size: 3 mm. Shape: regular. Reactivity: brisk. Consensual response: intact. Accommodation: intact.   CN III: no CN III palsy  CN VI: no CN VI palsy  Nystagmus: none   Conjugate gaze: present    CN V   Right facial sensation deficit: none  Left facial sensation deficit: none    CN VII   Right facial weakness: none  Left facial weakness: none    CN VIII   Hearing: intact    CN IX, X   Palate: symmetric    CN XI   Right sternocleidomastoid strength: normal  Left sternocleidomastoid strength: normal  Right trapezius strength: normal  Left trapezius strength: normal    CN XII   Tongue: not atrophic  Fasciculations: absent  Tongue deviation: none    Motor Exam   Muscle bulk: normal  Overall muscle tone: normal    Strength   Strength 5/5 throughout.     Sensory Exam   Light touch normal.     Gait, Coordination, and Reflexes     Gait  Gait: normal    Coordination   Finger to nose coordination: normal    Tremor   Resting tremor: absent  Intention tremor: absent  Action tremor: absent    Reflexes   Reflexes 2+ except as noted.   Right Young: absent  Left Young: absent  Right ankle clonus: absent  Left ankle clonus: absent     Discharge Summary- Neurosurgery   Anusha Mchugh 69 y.o. female MRN: [id number]  [facility] Encounter: [id number]      Admission Date:   Admission Orders (From admission, onward)       Ordered        12/16/24 0819  Inpatient Admission  Once                             Discharge Date: 12/21/2024    Admitting Diagnosis: CSF otorrhea [G96.01]    Discharge Diagnosis: CSF otorrhea, tegmen defect  Medical Problems       Resolved Problems  Date Reviewed: 12/3/2024   None         Attending Physician: Oscar Borjas MD    Consulting Physician: ENT (Dr. Echols)    Procedures Performed: Procedure(s):  CRANIOTOMY MIDDLE FOSSA,RETROMASTOID APPROACH FOR ENT  Right endoscopic middle fossa craniotomy for repair of tegmen defect and CSF leak with temporalis fascia or muscle flap, with neuromonitoring (CN 7/8) and intraoperative lumbar drain placement    Pathology: N/A    Hospital Course: The patient presented on 12/16/2024 for elective middle fossa craniotomy via retromastoid approach for right endoscopic repair of tegmen defect and CSF leak with temporalis fascia and intraoperative lumbar drain placement with Dr. Golden.  She tolerated the procedure well and was transferred to the PACU and then the ICU in good condition.  Lumbar drain was maintained and eventually discharged on postoperative day 4.  She progressed with physical therapy and recovered well.  ENT continue to follow and removed ear wick on postprocedure day 5.  She was discharged home in good condition.    Condition at Discharge: good     Discharge Instructions/Information to Patient and Family:   See after visit summary for information provided to patient and family.      Provisions for Follow-Up Care:  See after visit summary for information related to follow-up care and any pertinent home health orders.      Disposition: Home    Planned Readmission: No    Discharge Statement   I spent 20 minutes discharging the patient. This time was spent on the day of discharge. I had direct contact with the patient on the day of discharge. Additional documentation is required if more than 30 minutes were spent on discharge.     Discharge Medications:  See after visit summary for reconciled discharge medications provided to patient and  family.

## 2024-12-21 NOTE — PROGRESS NOTES
Progress Note - ENT   Name: Anusha Mchugh 69 y.o. female I MRN: [id number]  Unit/Bed#: [facility] 721-01 I Date of Admission: 12/16/2024   Date of Service: 12/21/2024 I Hospital Day: 5     Date of Service: 12/21/2024 6:37 AM    HPI  Patient is a 69 y.o. 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    AVSS on RA    PHYSICAL EXAM:  Vitals:    12/21/24 0300   BP: 154/74   Pulse: 67   Resp: 17   Temp:    SpO2:         General: No acute distress  HEENT: R ear incision c/d/I, edema improving. Otowick removed with expected serosanguinous drainage in the EAC. EOMi  Facial nerve intact in all branches.   Neurology: No focal deficits, nystagmus bilaterally  Lumbar drain in place  Lungs: Breathing easy, unlabored and even on room air  Cardio: well perfused      Intake/Output Summary (Last 24 hours) at 12/21/2024 0637  Last data filed at 12/20/2024 1730  Gross per 24 hour   Intake 1428.75 ml   Output 3050 ml   Net -1621.25 ml         LABORATORY    Recent Labs     12/19/24  0622 12/20/24  0030   WBC 14.63* 11.27*   HGB 11.3* 11.4*   HCT 34.3* 33.3*    210       Recent Labs     12/19/24  0622 12/20/24  0030 12/20/24  0633   K 3.2* 3.3* 3.8    103 101   BUN 8 10 10   PHOS 1.7* 2.4  --    MG 2.0 1.9  --      UOP 1.4 cc/kg/hr    Patient Active Problem List   Diagnosis    Primary hypertension    Abnormal mammogram of right breast    Allergies    Vitamin D deficiency    Anxiety and depression    Generalized arthritis    Mild intermittent asthma with acute exacerbation    Gastroesophageal reflux disease without esophagitis    History of bilateral knee arthroplasty    Sleep apnea treated with continuous positive airway pressure (CPAP)     Decreased hearing of right ear    LOUISE (obstructive sleep apnea)    Prediabetes    Preop examination    CSF otorrhea    MORBID (SEVERE) OBESITY DUE TO EXCESS CALORIES         ASSESSMENT  Patient is a 69 y.o. female w/ acute and chronic problems as above, who presents  s/p endoscopic right middle fossa craniotomy for repair of tegmen defect and CSF leak with temporalis fascia or muscle flap, with neuromonitoring (CN 7/8) and intraoperative lumbar drain placement on 12/16 , doing well.    PLAN  - Continued PO or IV antibiotics not needed from ENT perspective, no antibiotic drops to the ear   - rest of care per primary  -outpatient ENT follow up     Laney Sawant MD PGY-2  [facility] Otolaryngology - Head and Neck Surgery  Available on Epic Secure Chat.  Please contact ENT Resident [employer] chat Role for any questions or concerns.

## 2024-12-21 NOTE — DISCHARGE INSTR - AVS FIRST PAGE
DO NOT USE CPAP MACHINE UNTIL POSTOPERATIVE VISIT      Discharge Instructions  Craniotomy for Tegman defect     Surgical incision care:  Keep dressings in place for 2 days.    After 2 days, incisions may be left open to air, but should remain clean.  THREE days after surgery you START showering using a baby shampoo including head incision. Rinse off shampoo and pat dry.   Wash hair AT LEAST every other day  Continue to use clean towel and washcloth for 2 weeks post-op.  Avoid rubbing the incision but gently massage hair.   Do NOT immerse the incisions in water for 6 weeks.  Staples/suture will be removed at your 2 week postoperative visit.   Do not apply any creams or ointments to the incision, unless otherwise instructed by [facility].  Contact office if increasing redness, drainage, pain or swelling occurs around the incisions or if you develop a fever greater than 101F  Do not dye/perm hair or use any hair products until cleared by Neurosurgery.             Activity:  Do not lift, push or pull more than 10 pounds for 2 weeks.  Avoid bending, lifting and twisting for 2 weeks. No running. No athletic activities until cleared.   No driving for at least 2 weeks or until cleared by Neurosurgery.   Do not use a hair dryer, and NO hair products such as mousse, oils, and gels. Do not brush your hair away from the incision since this will put strain on the suture line.  Do NOT dye or perm hair for 6 weeks or until cleared by physician.  Continue to change bed linens and pajamas more frequently. Wear clean clothes daily.   May walk as tolerated. Recommend 4 short walks daily.         Postoperative medication:  [facility] will provide pain medication in the postoperative period. All prescriptions must come from a single practice.   Take medications as prescribed.  Call office with any questions/concerns.  May use over the counter Tylenol. No NSAIDs (ie. Ibuprofen, Aleve,  Advil, Naproxen)  Please contact office for questions regarding dosage and modifications.  No antiplatelet or anticoagulation medication (ie. Coumadin, Aspirin, Plavix) until cleared by [facility], unless otherwise instructed. Please contact . [facility] if you have any questions about the effects of any of your medications on blood clotting.  Do not operate heavy machinery or vehicles while taking sedating medications.  Use a bowel regimen while on opioids as they induce constipation. Ie. Senokot-S, Miralax, Colace, etc. Increase fiber and water intake.       Follow-up as scheduled for a 2 week post-operative visit for an incision check.    ** Please notify the office if incision becomes red, swollen, tender, or has increased drainage, and temp>101.  Return to the ER if you experience increased headache, drowsiness, weakness, nausea/vomiting, or seizures.**

## 2024-12-21 NOTE — ASSESSMENT & PLAN NOTE
POD 5 Right endoscopic middle fossa craniotomy for repair of tegmen defect and CSF leak with temporalis fascia or muscle flap, with neuromonitoring (CN 7/8) and intraoperative lumbar drain placement ([place], 12/16/24)    Imaging:  CT head w/o, 12/20/2024: Edema and small volume blood products in the right inferior temporal lobe overlying the operative site.  Slight worsening of pneumocephalus adjacent to the mastoidectomy.  Linear high attenuation along the tentorium along the right may reflect bone graft material.    Plan:  Continue to monitor neurological exam  STAT CT head w/o for decline in GCS greater than 2 points in 1 hour  Lumbar drain removed 12/20.    Will complete last dose of Ancef status post drain removal  Suture tied in place and will be removed during follow up appointment  ENT following, appreciate recommendations  Continues with some drainage from the ear - ear wick removed today by ENT  No antibiotic drops to ear  Medicine:  SBP < 160  Medical management for post op nausea, PRN zofran and reglan ordered. Added Tigan and phenergan for continued post op nausea and vomiting  Post op labs reviewed. Expected leukocytosis 2/2 steroids and post op state  Decadron x48h for vasogenic edema  PPI while on steroids  Keppra x7d for seizure ppx  PT/OT evaluation, ok to mobilize  DVT ppx: SCDs, SQH TID.     Neurosurgery will continue to follow as primary team, plan to discharge home today with scheduled follow up in about 2 weeks. Please call with questions or concerns.

## 2024-12-21 NOTE — PLAN OF CARE
Problem: PAIN - ADULT  Goal: Verbalizes/displays adequate comfort level or baseline comfort level  Description: Interventions:  - Encourage patient to monitor pain and request assistance  - Assess pain using appropriate pain scale  - Administer analgesics based on type and severity of pain and evaluate response  - Implement non-pharmacological measures as appropriate and evaluate response  - Consider cultural and social influences on pain and pain management  - Notify physician/advanced practitioner if interventions unsuccessful or patient reports new pain  Outcome: Progressing      Advancement Flap (Double) Text: The defect edges were debeveled with a #15 scalpel blade.  Given the location of the defect and the proximity to free margins a double advancement flap was deemed most appropriate.  Using a sterile surgical marker, the appropriate advancement flaps were drawn incorporating the defect and placing the expected incisions within the relaxed skin tension lines where possible.    The area thus outlined was incised deep to adipose tissue with a #15 scalpel blade.  The skin margins were undermined to an appropriate distance in all directions utilizing iris scissors.

## 2024-12-22 ENCOUNTER — HOME CARE VISIT (OUTPATIENT)
Dept: HOME HEALTH SERVICES | Facility: HOME HEALTHCARE | Age: 69
End: 2024-12-22
Payer: COMMERCIAL

## 2024-12-22 VITALS
SYSTOLIC BLOOD PRESSURE: 144 MMHG | OXYGEN SATURATION: 98 % | TEMPERATURE: 98.8 F | RESPIRATION RATE: 16 BRPM | DIASTOLIC BLOOD PRESSURE: 94 MMHG | HEART RATE: 60 BPM

## 2024-12-22 PROCEDURE — 400013 VN SOC

## 2024-12-22 PROCEDURE — G0299 HHS/HOSPICE OF RN EA 15 MIN: HCPCS

## 2024-12-22 NOTE — UTILIZATION REVIEW
NOTIFICATION OF ADMISSION DISCHARGE   This is a Notification of Discharge from [facility]. Please be advised that this patient has been discharge from our facility. Below you will find the admission and discharge date and time including the patient’s disposition.   UTILIZATION REVIEW CONTACT:  Lena Lea  Utilization   Network Utilization Review Department  Phone: [phone] x carefully listen to the prompts. All voicemails are confidential.  Email: [email or web address]     ADMISSION INFORMATION  PRESENTATION DATE: 12/16/2024  7:17 AM  OBERVATION ADMISSION DATE: N/A  INPATIENT ADMISSION DATE: 12/16/24  8:19 AM   DISCHARGE DATE: 12/21/2024  2:58 PM   DISPOSITION:Home with Home Health Care    Network Utilization Review Department  ATTENTION: Please call with any questions or concerns to [phone] and carefully listen to the prompts so that you are directed to the right person. All voicemails are confidential.   For Discharge needs, contact Care Management DC Support Team at [phone] opt. 2  Send all requests for admission clinical reviews, approved or denied determinations and any other requests to dedicated fax number below belonging to the campus where the patient is receiving treatment. List of dedicated fax numbers for the Facilities:  FACILITY NAME UR FAX NUMBER   ADMISSION DENIALS (Administrative/Medical Necessity) [phone]   DISCHARGE SUPPORT TEAM ([facility]) [phone]   PARENT CHILD HEALTH (Maternity/NICU/Pediatrics) [phone]   [facility] [phone]   [facility] [phone]   [facility] [phone]   [facility] [phone]   [facility] [phone]   [facility] [phone]   [facility] [phone]   [facility]  [place] [phone]   [facility] [phone]   [facility] [phone]   [facility] [phone]   [facility] [phone]

## 2024-12-23 ENCOUNTER — HOME CARE VISIT (OUTPATIENT)
Dept: HOME HEALTH SERVICES | Facility: HOME HEALTHCARE | Age: 69
End: 2024-12-23
Payer: COMMERCIAL

## 2024-12-23 ENCOUNTER — TELEPHONE (OUTPATIENT)
Dept: NEUROSURGERY | Facility: CLINIC | Age: 69
End: 2024-12-23

## 2024-12-24 ENCOUNTER — HOME CARE VISIT (OUTPATIENT)
Dept: HOME HEALTH SERVICES | Facility: HOME HEALTHCARE | Age: 69
End: 2024-12-24
Payer: COMMERCIAL

## 2024-12-24 ENCOUNTER — TELEPHONE (OUTPATIENT)
Dept: HOME HEALTH SERVICES | Facility: HOME HEALTHCARE | Age: 69
End: 2024-12-24

## 2024-12-26 ENCOUNTER — HOME CARE VISIT (OUTPATIENT)
Dept: HOME HEALTH SERVICES | Facility: HOME HEALTHCARE | Age: 69
End: 2024-12-26
Payer: COMMERCIAL

## 2024-12-28 ENCOUNTER — HOME CARE VISIT (OUTPATIENT)
Dept: HOME HEALTH SERVICES | Facility: HOME HEALTHCARE | Age: 69
End: 2024-12-28
Payer: COMMERCIAL

## 2024-12-30 ENCOUNTER — CLINICAL SUPPORT (OUTPATIENT)
Dept: NEUROSURGERY | Facility: CLINIC | Age: 69
End: 2024-12-30

## 2024-12-30 DIAGNOSIS — Z48.89 POSTOPERATIVE VISIT: Primary | ICD-10-CM

## 2024-12-30 PROCEDURE — 99024 POSTOP FOLLOW-UP VISIT: CPT | Performed by: NEUROLOGICAL SURGERY

## 2024-12-30 NOTE — PROGRESS NOTES
Post-Op Visit- Neurosurgery    Anusha Mchugh 69 y.o. female MRN: [id number]    Chief Complaint:  Patient presents post: Craniotomy Middle Fossa,retromastoid Approach For Ent - Right and Right endoscopic middle fossa craniotomy for repair of tegmen defect and CSF leak with temporalis fascia or muscle flap, with neuromonitoring (CN 7/8) and intraoperative lumbar drain placement - Right    History of Present Illness:  Patient presents for 2 week POV for incision check accompanied by child and ambulating with a cane.  Patient reports she is doing well overall and denies any incisional issues or fevers.  she denies any new weakness, numbness or tingling since the surgery.   Patient denies surgical pain at this time and rates their pain as a Pain Score:   1/10.  Patient is currently taking oxycodone as needed with complete resolution of pain symptoms.        Wound Exam: Incision well approximated.  No erythema, edema or drainage present.   Location:     Procedure:  Staple/suture removal.   Procedure Note:1 nylon suture was removed from lumbar drain site. Patient Status: the patient tolerated the procedure well.    Complications: None.       Discussion/Summary:  Doing well postoperatively. Reviewed incision care with patient including daily observation for s/s infection including: increased erythema, edema, drainage, dehiscence of incision or fever >101.  Should these be observed, she understands that she is to call and/or return immediately for reassessment.  Advised patient to continue cleansing area with mild soap and water and pat dry. Not to apply any lotions, creams, or ointments, & not to submerge in any water for 4 more weeks.     She is to maintain activity restrictions until cleared by the surgeon. Activity levels were also reviewed with the patient in detail, she is to lift no greater than 10 pounds and ambulation is encouraged as  tolerated.     Verified date/time/location of upcoming POV. She is scheduled to follow up with ENT on 1/2/2025. She is to call the office with any further questions or concerns, or if any incisional issues or fevers would arise.

## 2025-01-30 ENCOUNTER — OFFICE VISIT (OUTPATIENT)
Dept: NEUROSURGERY | Facility: CLINIC | Age: 70
End: 2025-01-30

## 2025-01-30 VITALS
HEIGHT: 62 IN | BODY MASS INDEX: 40.3 KG/M2 | WEIGHT: 219 LBS | HEART RATE: 83 BPM | OXYGEN SATURATION: 97 % | DIASTOLIC BLOOD PRESSURE: 76 MMHG | SYSTOLIC BLOOD PRESSURE: 122 MMHG | TEMPERATURE: 97.3 F

## 2025-01-30 DIAGNOSIS — G96.01 CSF OTORRHEA: Primary | ICD-10-CM

## 2025-01-30 PROCEDURE — 99024 POSTOP FOLLOW-UP VISIT: CPT | Performed by: NEUROLOGICAL SURGERY

## 2025-01-30 NOTE — PROGRESS NOTES
Name: Anusha Mchugh      : 1955      MRN: [id number]  Encounter Provider: Oscar Borjas MD  Encounter Date: 2025   Encounter department: [facility] NEUROSURGICAL [facility]  :  Assessment & Plan        History of Present Illness     Patient is a 69-year-old female with h/o obesity (BMI 40), anxiety/depression, HTN, LOUISE who p/w right hearing loss and CSF otorrhea, now s/p lumbar drain placement and right endoscopic middle fossa craniectomy for repair of CSF leak and tegmen defect with ENT on 24.    She returns with her daughter.    Today, she reports no new neurologic symptoms or deficits. No recurrent otorrhea or nuchal rigidity.    Incision is healing well without erythema, edema, dehiscence, drainage, or underlying fluid collection. Prior LD site CDI.    At this time, I have no acute neurosurgical or postoperative concerns. She is eager to drive and travel to [place] for her sister's 87th birthday with all their 8 siblings, which I cleared her to do. Continue follow-up, next for 3-month POV (no imaging unless clinically indicated).      All questions and concerns were addressed during this visit.    HPI  Review of Systems   Constitutional: Negative.    HENT:  Positive for tinnitus (occasionally).    Eyes: Negative.  Negative for visual disturbance.   Respiratory: Negative.     Cardiovascular: Negative.    Gastrointestinal: Negative.    Endocrine: Negative.    Genitourinary: Negative.  Negative for frequency and urgency.   Musculoskeletal: Negative.    Skin: Negative.    Allergic/Immunologic: Negative.    Neurological:  Positive for numbness. Negative for dizziness, speech difficulty, light-headedness and headaches.        6 WEEK POV- Right endoscopic middle fossa craniotomy for repair of tegmen defect and CSF leak  on 24     ENT on 2025    NoAC/None smoker        Hematological: Negative.    Psychiatric/Behavioral: Negative.  Negative for sleep disturbance.    All other  "systems reviewed and are negative.   I have personally reviewed the MA's review of systems and made changes as necessary.       Objective   /76 (Patient Position: Sitting, Cuff Size: Standard)   Pulse 83   Temp (!) 97.3 °F (36.3 °C) (Temporal)   Ht 5' 2\" (1.575 m)   Wt 99.3 kg (219 lb)   SpO2 97%   BMI 40.06 kg/m²     Physical Exam  Constitutional:       Appearance: Normal appearance.   HENT:      Head: Normocephalic and atraumatic.   Eyes:      General: Lids are normal.      Extraocular Movements: Extraocular movements intact.      Pupils: Pupils are equal, round, and reactive to light.   Cardiovascular:      Rate and Rhythm: Normal rate and regular rhythm.      Pulses: Normal pulses.      Heart sounds: Normal heart sounds.   Pulmonary:      Effort: Pulmonary effort is normal.      Breath sounds: Normal breath sounds.   Abdominal:      General: Abdomen is flat.      Palpations: Abdomen is soft.   Musculoskeletal:         General: Normal range of motion.      Cervical back: Normal range of motion.   Skin:     General: Skin is warm.   Neurological:      General: No focal deficit present.      Mental Status: She is alert. Mental status is at baseline.      Motor: Motor strength is normal.     Coordination: Coordination is intact.      Deep Tendon Reflexes: Reflexes are normal and symmetric.   Psychiatric:         Mood and Affect: Mood normal.         Speech: Speech normal.         Behavior: Behavior normal.       Neurological Exam  Mental Status  Alert. Oriented to person, place, time and situation. Speech is normal. Language is fluent with no aphasia. Attention and concentration are normal.    Cranial Nerves  CN II: Visual acuity is normal. Visual fields full to confrontation.  CN III, IV, VI: Extraocular movements intact bilaterally. Normal lids and orbits bilaterally. Pupils equal round and reactive to light bilaterally.  CN V: Facial sensation is normal.  CN VII: Full and symmetric facial movement.  CN " VIII: Hearing is normal.  CN IX, X: Palate elevates symmetrically. Normal gag reflex.  CN XI: Shoulder shrug strength is normal.  CN XII: Tongue midline without atrophy or fasciculations.    Motor  Normal muscle bulk throughout. Normal muscle tone. Strength is 5/5 throughout all four extremities.    Sensory  Sensation is intact to light touch, pinprick, vibration and proprioception in all four extremities.    Reflexes  Deep tendon reflexes are 2+ and symmetric in all four extremities.    Coordination    Finger-to-nose, rapid alternating movements and heel-to-shin normal bilaterally without dysmetria.    Gait  Normal casual, toe, heel and tandem gait.  No otorrhea  Incision healing well    Radiology Results Review: I have reviewed the following images/report studies in PACS: No results found.     Administrative Statements   I have spent a total time of 30 minutes in caring for this patient on the day of the visit/encounter including Diagnostic results, Prognosis, Risks and benefits of tx options, Instructions for management, Patient and family education, Importance of tx compliance, Risk factor reductions, Impressions, Counseling / Coordination of care, Documenting in the medical record, Reviewing / ordering tests, medicine, procedures  , Obtaining or reviewing history  , and Communicating with other healthcare professionals . Topics discussed with the patient / family include symptom assessment and management, medication review, psychosocial support, advanced directives, goals of care, supportive listening, and anticipatory guidance.

## 2025-02-21 PROBLEM — D64.9 ANEMIA, MILD: Status: ACTIVE | Noted: 2025-02-21

## 2025-02-21 PROBLEM — Z00.00 MEDICARE ANNUAL WELLNESS VISIT, SUBSEQUENT: Status: ACTIVE | Noted: 2025-02-21

## 2025-02-21 PROBLEM — Z01.818 PREOP EXAMINATION: Status: RESOLVED | Noted: 2024-12-02 | Resolved: 2025-02-21

## 2025-02-21 NOTE — ASSESSMENT & PLAN NOTE
Recheck cbc and iron    Orders:  •  CBC and differential; Future  •  Iron Panel (Includes Ferritin, Iron Sat%, Iron, and TIBC); Future

## 2025-02-21 NOTE — PROGRESS NOTES
Name: Anusha Mchugh      : 1955      MRN: [id number]  Encounter Provider: Juju Mustafa MD  Encounter Date: 2025   Encounter department: [facility] MEDICINE  :  Assessment & Plan  Medicare annual wellness visit, subsequent  Reviewed and diagnostics ordered       Mild intermittent asthma with acute exacerbation  No recent flare ups        Primary hypertension  Well controlled on current therapy continue with current medications and will reassess next visit         Vitamin D deficiency  Cont otc 2000 units qd        MORBID (SEVERE) OBESITY DUE TO EXCESS CALORIES         Anxiety and depression  Ok on meds        Gastroesophageal reflux disease without esophagitis  Otc prn        Anemia, mild  Recheck cbc and iron    Orders:  •  CBC and differential; Future  •  Iron Panel (Includes Ferritin, Iron Sat%, Iron, and TIBC); Future    Prediabetes  Check hga1c 1 mo  Orders:  •  Hemoglobin A1C; Future    Encounter for immunization    Orders:  •  Pneumococcal Conjugate Vaccine 20-valent (Pcv20)    Postmenopausal    Orders:  •  DXA bone density spine hip and pelvis; Future           History of Present Illness   Pt is here for interval visit and evaluation of multiple medical problems, review of medications, labs, Health Maintenance and any recent specialty consults due for medicare wellness          Review of Systems   Constitutional:  Negative for appetite change, chills, fatigue and fever.   Respiratory:  Negative for cough, chest tightness and shortness of breath.    Cardiovascular:  Negative for chest pain, palpitations and leg swelling.   Gastrointestinal:  Negative for abdominal pain, constipation, diarrhea, nausea and vomiting.   Genitourinary:  Negative for difficulty urinating and frequency.   Musculoskeletal:  Negative for arthralgias, back pain, gait problem and neck pain.   Skin:  Negative for rash.   Neurological:  Negative for dizziness, weakness, light-headedness, numbness and  "headaches.   Hematological:  Does not bruise/bleed easily.   Psychiatric/Behavioral:  Negative for dysphoric mood and sleep disturbance. The patient is not nervous/anxious.        Objective   /80 (BP Location: Left arm, Patient Position: Sitting, Cuff Size: Standard)   Pulse 75   Temp 97.7 °F (36.5 °C) (Tympanic)   Resp 16   Ht 5' 2\" (1.575 m)   Wt 99.3 kg (219 lb)   SpO2 96%   BMI 40.06 kg/m²      Physical Exam  Constitutional:       General: She is not in acute distress.     Appearance: Normal appearance. She is normal weight.   Neck:      Thyroid: No thyromegaly.      Vascular: No carotid bruit.   Cardiovascular:      Rate and Rhythm: Normal rate and regular rhythm.      Heart sounds: Normal heart sounds. No murmur heard.  Pulmonary:      Effort: Pulmonary effort is normal. No respiratory distress.      Breath sounds: Normal breath sounds. No wheezing, rhonchi or rales.   Abdominal:      Palpations: Abdomen is soft.      Tenderness: There is no abdominal tenderness.   Musculoskeletal:      Cervical back: Normal range of motion and neck supple.      Right lower leg: No edema.      Left lower leg: No edema.   Skin:     Findings: No rash.   Neurological:      General: No focal deficit present.      Mental Status: She is alert and oriented to person, place, and time. Mental status is at baseline.      Gait: Gait normal.   Psychiatric:         Mood and Affect: Mood normal.         Behavior: Behavior normal.         Answers submitted by the patient for this visit:  Medicare Annual Wellness Visit (Submitted on 2/24/2025)  How would you rate your overall health?: good  Compared to last year, how is your physical health?: same  In general, how satisfied are you with your life?: satisfied  Compared to last year, how is your eyesight?: same  Compared to last year, how is your hearing?: same  Compared to last year, how is your emotional/mental health?: same  How often is anger a problem for you?: never, " "rarely  How often do you feel unusually tired/fatigued?: sometimes  In the past 7 days, how much pain have you experienced?: a lot  If you answered \"some\" or \"a lot\", please rate the severity of your pain on a scale of 1 to 10 (1 being the least severe pain and 10 being the most intense pain).: 5/10  In the past 6 months, have you lost or gained 10 pounds without trying?: Yes  Additional Comments: This was due to a mastoidectomy that was dne on 12/16/24  One or more falls in the last year: No  In the past 6 months, have you accidentally leaked urine?: Yes  Do you have trouble with the stairs inside or outside your home?: No  Does your home have working smoke alarms?: Yes  Does your home have a carbon monoxide monitor?: Yes  Which safety hazards (if any) have you experienced in your home? Please select all that apply.: none  How would you describe your current diet? Please select all that apply.: Regular, Other (please comment)  Additional Comments: Just started Weight Watchers program  In addition to prescription medications, are you taking any over-the-counter supplements?: Yes  If yes, what supplements are you taking?: Vitamin D3 2000 mg, Fish Oil 2000 mg  Can you manage your medications?: Yes  Are you currently taking any opioid medications?: No  Can you walk and transfer into and out of your bed and chair?: Yes  Can you dress and groom yourself?: Yes  Can you bathe or shower yourself?: Yes  Can you feed yourself?: Yes  Can you do your laundry/ housekeeping?: Yes  Can you manage your money, pay your bills, and track your expenses?: Yes  Can you make your own meals?: Yes  Can you do your own shopping?: Yes  Within the last 12 months, have you had any hospitalizations or Emergency Department visits?: Yes  If yes, how many times have you been hospitalized within the past year?: 1-2  Do you have a living will?: No  Do you have a Durable POA (Power of ) for healthcare decisions?: No  Do you have an Advanced " Directive for end of life decisions?: No  How often have you used an illegal drug (including marijuana) or a prescription medication for non-medical reasons in the past year?: never  What is the typical number of drinks you consume in a day?: 0  What is the typical number of drinks you consume in a week?: 0  How often did you have a drink containing alcohol in the past year?: monthly or less  How many drinks did you have on a typical day  when you were drinking in the past year?: 0  How often did you have 6 or more drinks on one occasion in the past year?: never

## 2025-02-26 ENCOUNTER — OFFICE VISIT (OUTPATIENT)
Dept: FAMILY MEDICINE CLINIC | Facility: CLINIC | Age: 70
End: 2025-02-26
Payer: COMMERCIAL

## 2025-02-26 VITALS
TEMPERATURE: 97.7 F | HEART RATE: 75 BPM | OXYGEN SATURATION: 96 % | BODY MASS INDEX: 40.3 KG/M2 | DIASTOLIC BLOOD PRESSURE: 80 MMHG | HEIGHT: 62 IN | WEIGHT: 219 LBS | SYSTOLIC BLOOD PRESSURE: 118 MMHG | RESPIRATION RATE: 16 BRPM

## 2025-02-26 DIAGNOSIS — Z23 ENCOUNTER FOR IMMUNIZATION: ICD-10-CM

## 2025-02-26 DIAGNOSIS — J45.21 MILD INTERMITTENT ASTHMA WITH ACUTE EXACERBATION: ICD-10-CM

## 2025-02-26 DIAGNOSIS — F32.A ANXIETY AND DEPRESSION: ICD-10-CM

## 2025-02-26 DIAGNOSIS — R73.03 PREDIABETES: ICD-10-CM

## 2025-02-26 DIAGNOSIS — F41.9 ANXIETY AND DEPRESSION: ICD-10-CM

## 2025-02-26 DIAGNOSIS — E55.9 VITAMIN D DEFICIENCY: ICD-10-CM

## 2025-02-26 DIAGNOSIS — D64.9 ANEMIA, MILD: ICD-10-CM

## 2025-02-26 DIAGNOSIS — Z78.0 POSTMENOPAUSAL: ICD-10-CM

## 2025-02-26 DIAGNOSIS — Z00.00 MEDICARE ANNUAL WELLNESS VISIT, SUBSEQUENT: Primary | ICD-10-CM

## 2025-02-26 DIAGNOSIS — I10 PRIMARY HYPERTENSION: ICD-10-CM

## 2025-02-26 DIAGNOSIS — K21.9 GASTROESOPHAGEAL REFLUX DISEASE WITHOUT ESOPHAGITIS: ICD-10-CM

## 2025-02-26 DIAGNOSIS — E66.01 MORBID (SEVERE) OBESITY DUE TO EXCESS CALORIES (HCC): ICD-10-CM

## 2025-02-26 PROCEDURE — 99214 OFFICE O/P EST MOD 30 MIN: CPT | Performed by: FAMILY MEDICINE

## 2025-02-26 PROCEDURE — 90677 PCV20 VACCINE IM: CPT | Performed by: FAMILY MEDICINE

## 2025-02-26 PROCEDURE — G0009 ADMIN PNEUMOCOCCAL VACCINE: HCPCS | Performed by: FAMILY MEDICINE

## 2025-02-26 PROCEDURE — G0439 PPPS, SUBSEQ VISIT: HCPCS | Performed by: FAMILY MEDICINE

## 2025-02-26 PROCEDURE — G2211 COMPLEX E/M VISIT ADD ON: HCPCS | Performed by: FAMILY MEDICINE

## 2025-02-26 NOTE — ASSESSMENT & PLAN NOTE
Orders:  •  CBC and differential; Future  •  Iron Panel (Includes Ferritin, Iron Sat%, Iron, and TIBC); Future

## 2025-02-26 NOTE — PROGRESS NOTES
Name: Anusha Mchugh      : 1955      MRN: [id number]  Encounter Provider: Juju Mustafa MD  Encounter Date: 2025   Encounter department: [facility]    Assessment & Plan  Medicare annual wellness visit, subsequent         Mild intermittent asthma with acute exacerbation         Primary hypertension         Vitamin D deficiency         MORBID (SEVERE) OBESITY DUE TO EXCESS CALORIES         Anxiety and depression         Gastroesophageal reflux disease without esophagitis         Anemia, mild    Orders:  •  CBC and differential; Future  •  Iron Panel (Includes Ferritin, Iron Sat%, Iron, and TIBC); Future    Prediabetes    Orders:  •  Hemoglobin A1C; Future    Encounter for immunization    Orders:  •  Pneumococcal Conjugate Vaccine 20-valent (Pcv20)    Postmenopausal    Orders:  •  DXA bone density spine hip and pelvis; Future       Preventive health issues were discussed with patient, and age appropriate screening tests were ordered as noted in patient's After Visit Summary. Personalized health advice and appropriate referrals for health education or preventive services given if needed, as noted in patient's After Visit Summary.    History of Present Illness     HPI   Patient Care Team:  Juju Mustafa MD as PCP - General (Family Medicine)  Juju Mustafa MD as PCP - PCP-[facility] ([facility])    Review of Systems  Medical History Reviewed by provider this encounter:  Tobacco  Allergies  Meds  Problems  Med Hx  Surg Hx  Fam Hx       Annual Wellness Visit Questionnaire   Anusha is here for her Subsequent Wellness visit.     Health Risk Assessment:   Patient rates overall health as good. Patient feels that their physical health rating is same. Patient is satisfied with their life. Eyesight was rated as same. Hearing was rated as same. Patient feels that their emotional and mental health rating is same. Patients states they are never, rarely angry. Patient states  they are sometimes unusually tired/fatigued. Pain experienced in the last 7 days has been a lot. Patient's pain rating has been 5/10. Patient states that she has experienced weight loss or gain in last 6 months. This was due to a mastoidectomy that was dne on 12/16/24    Fall Risk Screening:   In the past year, patient has experienced: no history of falling in past year      Urinary Incontinence Screening:   Patient has leaked urine accidently in the last six months.     Home Safety:  Patient does not have trouble with stairs inside or outside of their home. Patient has working smoke alarms and has working carbon monoxide detector. Home safety hazards include: none.     Nutrition:   Current diet is Regular and Other (please comment). Just started Weight Watchers program    Medications:   Patient is currently taking over-the-counter supplements. OTC medications include: see medication list. Patient is able to manage medications.     Activities of Daily Living (ADLs)/Instrumental Activities of Daily Living (IADLs):   Walk and transfer into and out of bed and chair?: Yes  Dress and groom yourself?: Yes    Bathe or shower yourself?: Yes    Feed yourself? Yes  Do your laundry/housekeeping?: Yes  Manage your money, pay your bills and track your expenses?: Yes  Make your own meals?: Yes    Do your own shopping?: Yes    Previous Hospitalizations:   Any hospitalizations or ED visits within the last 12 months?: Yes    How many hospitalizations have you had in the last year?: 1-2    Advance Care Planning:   Living will: No    Durable POA for healthcare: No    Advanced directive: No      Cognitive Screening:   Provider or family/friend/caregiver concerned regarding cognition?: No    PREVENTIVE SCREENINGS      Cardiovascular Screening:    General: Risks and Benefits Discussed    Due for: Lipid Panel      Diabetes Screening:     General: Risks and Benefits Discussed    Due for: Blood Glucose      Colorectal Cancer Screening:      General: Screening Current      Breast Cancer Screening:     General: Screening Current      Cervical Cancer Screening:    General: Screening Not Indicated      Osteoporosis Screening:    General: Risks and Benefits Discussed    Due for: DXA Axial      Abdominal Aortic Aneurysm (AAA) Screening:        General: Screening Not Indicated      Lung Cancer Screening:     General: Screening Not Indicated      Hepatitis C Screening:    General: Screening Current    Screening, Brief Intervention, and Referral to Treatment (SBIRT)     Screening  Typical number of drinks in a day: 0  Typical number of drinks in a week: 0  Interpretation: Low risk drinking behavior.    AUDIT-C Screenin) How often did you have a drink containing alcohol in the past year? never  2) How many drinks did you have on a typical day when you were drinking in the past year? 0  3) How often did you have 6 or more drinks on one occasion in the past year? never    AUDIT-C Score: 0  Interpretation: Score 0-2 (female): Negative screen for alcohol misuse    Single Item Drug Screening:  How often have you used an illegal drug (including marijuana) or a prescription medication for non-medical reasons in the past year? never    Single Item Drug Screen Score: 0  Interpretation: Negative screen for possible drug use disorder    Social Drivers of Health     Financial Resource Strain: Low Risk  (2024)    Overall Financial Resource Strain (CARDIA)    • Difficulty of Paying Living Expenses: Not hard at all   Food Insecurity: No Food Insecurity (2025)    Hunger Vital Sign    • Worried About Running Out of Food in the Last Year: Never true    • Ran Out of Food in the Last Year: Never true   Transportation Needs: No Transportation Needs (2025)    PRAPARE - Transportation    • Lack of Transportation (Medical): No    • Lack of Transportation (Non-Medical): No   Housing Stability: Low Risk  (2025)    Housing Stability Vital Sign    • Unable to Pay  "for Housing in the Last Year: No    • Number of Times Moved in the Last Year: 0    • Homeless in the Last Year: No   Utilities: Not At Risk (2/24/2025)    [facility] Utilities    • Threatened with loss of utilities: No     No results found.    Objective   /80 (BP Location: Left arm, Patient Position: Sitting, Cuff Size: Standard)   Pulse 75   Temp 97.7 °F (36.5 °C) (Tympanic)   Resp 16   Ht 5' 2\" (1.575 m)   Wt 99.3 kg (219 lb)   SpO2 96%   BMI 40.06 kg/m²     Physical Exam    "

## 2025-02-26 NOTE — PATIENT INSTRUCTIONS
Medicare Preventive Visit Patient Instructions  Thank you for completing your Welcome to Medicare Visit or Medicare Annual Wellness Visit today. Your next wellness visit will be due in one year (2/27/2026).  The screening/preventive services that you may require over the next 5-10 years are detailed below. Some tests may not apply to you based off risk factors and/or age. Screening tests ordered at today's visit but not completed yet may show as past due. Also, please note that scanned in results may not display below.  Preventive Screenings:  Service Recommendations Previous Testing/Comments   Colorectal Cancer Screening  * Colonoscopy    * Fecal Occult Blood Test (FOBT)/Fecal Immunochemical Test (FIT)  * Fecal DNA/Cologuard Test  * Flexible Sigmoidoscopy Age: 45-75 years old   Colonoscopy: every 10 years (may be performed more frequently if at higher risk)  OR  FOBT/FIT: every 1 year  OR  Cologuard: every 3 years  OR  Sigmoidoscopy: every 5 years  Screening may be recommended earlier than age 45 if at higher risk for colorectal cancer. Also, an individualized decision between you and your healthcare provider will decide whether screening between the ages of 76-85 would be appropriate. Colonoscopy: 10/17/2022  FOBT/FIT: Not on file  Cologuard: Not on file  Sigmoidoscopy: Not on file    Screening Current     Breast Cancer Screening Age: 40+ years old  Frequency: every 1-2 years  Not required if history of left and right mastectomy Mammogram: 07/25/2024    Screening Current   Cervical Cancer Screening Between the ages of 21-29, pap smear recommended once every 3 years.   Between the ages of 30-65, can perform pap smear with HPV co-testing every 5 years.   Recommendations may differ for women with a history of total hysterectomy, cervical cancer, or abnormal pap smears in past. Pap Smear: Not on file    Screening Not Indicated   Hepatitis C Screening Once for adults born between 1945 and 1965  More frequently in  patients at high risk for Hepatitis C Hep C Antibody: 02/05/2024    Screening Current   Diabetes Screening 1-2 times per year if you're at risk for diabetes or have pre-diabetes Fasting glucose: 119 mg/dL (11/20/2024)  A1C: 6.1 % (11/20/2024)  Risks and Benefits Discussed  Due for Blood Glucose   Cholesterol Screening Once every 5 years if you don't have a lipid disorder. May order more often based on risk factors. Lipid panel: 02/05/2024    Risks and Benefits Discussed  Due for Lipid Panel     Other Preventive Screenings Covered by Medicare:  Abdominal Aortic Aneurysm (AAA) Screening: covered once if your at risk. You're considered to be at risk if you have a family history of AAA.  Lung Cancer Screening: covers low dose CT scan once per year if you meet all of the following conditions: (1) Age 55-77; (2) No signs or symptoms of lung cancer; (3) Current smoker or have quit smoking within the last 15 years; (4) You have a tobacco smoking history of at least 20 pack years (packs per day multiplied by number of years you smoked); (5) You get a written order from a healthcare provider.  Glaucoma Screening: covered annually if you're considered high risk: (1) You have diabetes OR (2) Family history of glaucoma OR (3)  aged 50 and older OR (4)  American aged 65 and older  Osteoporosis Screening: covered every 2 years if you meet one of the following conditions: (1) You're estrogen deficient and at risk for osteoporosis based off medical history and other findings; (2) Have a vertebral abnormality; (3) On glucocorticoid therapy for more than 3 months; (4) Have primary hyperparathyroidism; (5) On osteoporosis medications and need to assess response to drug therapy.   Last bone density test (DXA Scan): 05/20/2022.  HIV Screening: covered annually if you're between the age of 15-65. Also covered annually if you are younger than 15 and older than 65 with risk factors for HIV infection. For pregnant  patients, it is covered up to 3 times per pregnancy.    Immunizations:  Immunization Recommendations   Influenza Vaccine Annual influenza vaccination during flu season is recommended for all persons aged >= 6 months who do not have contraindications   Pneumococcal Vaccine   * Pneumococcal conjugate vaccine = PCV13 (Prevnar 13), PCV15 (Vaxneuvance), PCV20 (Prevnar 20)  * Pneumococcal polysaccharide vaccine = PPSV23 (Pneumovax) Adults 19-63 yo with certain risk factors or if 65+ yo  If never received any pneumonia vaccine: recommend Prevnar 20 (PCV20)  Give PCV20 if previously received 1 dose of PCV13 or PPSV23   Hepatitis B Vaccine 3 dose series if at intermediate or high risk (ex: diabetes, end stage renal disease, liver disease)   Respiratory syncytial virus (RSV) Vaccine - COVERED BY MEDICARE PART D  * RSVPreF3 (Arexvy) CDC recommends that adults 60 years of age and older may receive a single dose of RSV vaccine using shared clinical decision-making (SCDM)   Tetanus (Td) Vaccine - COST NOT COVERED BY MEDICARE PART B Following completion of primary series, a booster dose should be given every 10 years to maintain immunity against tetanus. Td may also be given as tetanus wound prophylaxis.   Tdap Vaccine - COST NOT COVERED BY MEDICARE PART B Recommended at least once for all adults. For pregnant patients, recommended with each pregnancy.   Shingles Vaccine (Shingrix) - COST NOT COVERED BY MEDICARE PART B  2 shot series recommended in those 19 years and older who have or will have weakened immune systems or those 50 years and older     Health Maintenance Due:      Topic Date Due   • Breast Cancer Screening: Mammogram  07/25/2025   • Colorectal Cancer Screening  10/17/2032   • Hepatitis C Screening  Completed     Immunizations Due:      Topic Date Due   • Pneumococcal Vaccine: 65+ Years (2 of 2 - PCV) 12/15/2022   • COVID-19 Vaccine (9 - 2024-25 season) 12/02/2024     Advance Directives   What are advance directives?   Advance directives are legal documents that state your wishes and plans for medical care. These plans are made ahead of time in case you lose your ability to make decisions for yourself. Advance directives can apply to any medical decision, such as the treatments you want, and if you want to donate organs.   What are the types of advance directives?  There are many types of advance directives, and each state has rules about how to use them. You may choose a combination of any of the following:  Living will:  This is a written record of the treatment you want. You can also choose which treatments you do not want, which to limit, and which to stop at a certain time. This includes surgery, medicine, IV fluid, and tube feedings.   Durable power of  for healthcare (DPAHC):  This is a written record that states who you want to make healthcare choices for you when you are unable to make them for yourself. This person, called a proxy, is usually a family member or a friend. You may choose more than 1 proxy.  Do not resuscitate (DNR) order:  A DNR order is used in case your heart stops beating or you stop breathing. It is a request not to have certain forms of treatment, such as CPR. A DNR order may be included in other types of advance directives.  Medical directive:  This covers the care that you want if you are in a coma, near death, or unable to make decisions for yourself. You can list the treatments you want for each condition. Treatment may include pain medicine, surgery, blood transfusions, dialysis, IV or tube feedings, and a ventilator (breathing machine).  Values history:  This document has questions about your views, beliefs, and how you feel and think about life. This information can help others choose the care that you would choose.  Why are advance directives important?  An advance directive helps you control your care. Although spoken wishes may be used, it is better to have your wishes written down.  Spoken wishes can be misunderstood, or not followed. Treatments may be given even if you do not want them. An advance directive may make it easier for your family to make difficult choices about your care.   Urinary Incontinence   Urinary incontinence (UI)  is when you lose control of your bladder. UI develops because your bladder cannot store or empty urine properly. The 3 most common types of UI are stress incontinence, urge incontinence, or both.  Medicines:   May be given to help strengthen your bladder control. Report any side effects of medication to your healthcare provider.  Do pelvic muscle exercises often:  Your pelvic muscles help you stop urinating. Squeeze these muscles tight for 5 seconds, then relax for 5 seconds. Gradually work up to squeezing for 10 seconds. Do 3 sets of 15 repetitions a day, or as directed. This will help strengthen your pelvic muscles and improve bladder control.  Train your bladder:  Go to the bathroom at set times, such as every 2 hours, even if you do not feel the urge to go. You can also try to hold your urine when you feel the urge to go. For example, hold your urine for 5 minutes when you feel the urge to go. As that becomes easier, hold your urine for 10 minutes.   Self-care:   Keep a UI record.  Write down how often you leak urine and how much you leak. Make a note of what you were doing when you leaked urine.  Drink liquids as directed. You may need to limit the amount of liquid you drink to help control your urine leakage. Do not drink any liquid right before you go to bed. Limit or do not have drinks that contain caffeine or alcohol.   Prevent constipation.  Eat a variety of high-fiber foods. Good examples are high-fiber cereals, beans, vegetables, and whole-grain breads. Walking is the best way to trigger your intestines to have a bowel movement.  Exercise regularly and maintain a healthy weight.  Weight loss and exercise will decrease pressure on your bladder and help  you control your leakage.   Use a catheter as directed  to help empty your bladder. A catheter is a tiny, plastic tube that is put into your bladder to drain your urine.   Go to behavior therapy as directed.  Behavior therapy may be used to help you learn to control your urge to urinate.    Weight Management   Why it is important to manage your weight:  Being overweight increases your risk of health conditions such as heart disease, high blood pressure, type 2 diabetes, and certain types of cancer. It can also increase your risk for osteoarthritis, sleep apnea, and other respiratory problems. Aim for a slow, steady weight loss. Even a small amount of weight loss can lower your risk of health problems.  How to lose weight safely:  A safe and healthy way to lose weight is to eat fewer calories and get regular exercise. You can lose up about 1 pound a week by decreasing the number of calories you eat by 500 calories each day.   Healthy meal plan for weight management:  A healthy meal plan includes a variety of foods, contains fewer calories, and helps you stay healthy. A healthy meal plan includes the following:  Eat whole-grain foods more often.  A healthy meal plan should contain fiber. Fiber is the part of grains, fruits, and vegetables that is not broken down by your body. Whole-grain foods are healthy and provide extra fiber in your diet. Some examples of whole-grain foods are whole-wheat breads and pastas, oatmeal, brown rice, and bulgur.  Eat a variety of vegetables every day.  Include dark, leafy greens such as spinach, kale, denice greens, and mustard greens. Eat yellow and orange vegetables such as carrots, sweet potatoes, and winter squash.   Eat a variety of fruits every day.  Choose fresh or canned fruit (canned in its own juice or light syrup) instead of juice. Fruit juice has very little or no fiber.  Eat low-fat dairy foods.  Drink fat-free (skim) milk or 1% milk. Eat fat-free yogurt and low-fat  cottage cheese. Try low-fat cheeses such as mozzarella and other reduced-fat cheeses.  Choose meat and other protein foods that are low in fat.  Choose beans or other legumes such as split peas or lentils. Choose fish, skinless poultry (chicken or turkey), or lean cuts of red meat (beef or pork). Before you cook meat or poultry, cut off any visible fat.   Use less fat and oil.  Try baking foods instead of frying them. Add less fat, such as margarine, sour cream, regular salad dressing and mayonnaise to foods. Eat fewer high-fat foods. Some examples of high-fat foods include french fries, doughnuts, ice cream, and cakes.  Eat fewer sweets.  Limit foods and drinks that are high in sugar. This includes candy, cookies, regular soda, and sweetened drinks.  Exercise:  Exercise at least 30 minutes per day on most days of the week. Some examples of exercise include walking, biking, dancing, and swimming. You can also fit in more physical activity by taking the stairs instead of the elevator or parking farther away from stores. Ask your healthcare provider about the best exercise plan for you.      © Copyright [employer] 2018 Information is for End User's use only and may not be sold, redistributed or otherwise used for commercial purposes. All illustrations and images included in CareNotes® are the copyrighted property of [employer] or [employer]

## 2025-03-14 ENCOUNTER — HOSPITAL ENCOUNTER (OUTPATIENT)
Facility: HOSPITAL | Age: 70
Discharge: HOME | End: 2025-03-14
Attending: FAMILY MEDICINE
Payer: COMMERCIAL

## 2025-03-14 VITALS — HEIGHT: 62 IN | WEIGHT: 214 LBS | BODY MASS INDEX: 39.38 KG/M2

## 2025-03-14 DIAGNOSIS — Z78.0 POSTMENOPAUSAL: ICD-10-CM

## 2025-03-14 PROCEDURE — 77080 DXA BONE DENSITY AXIAL: CPT

## 2025-03-17 ENCOUNTER — RESULTS FOLLOW-UP (OUTPATIENT)
Dept: FAMILY MEDICINE CLINIC | Facility: CLINIC | Age: 70
End: 2025-03-17

## 2025-03-18 ENCOUNTER — OFFICE VISIT (OUTPATIENT)
Dept: NEUROSURGERY | Facility: CLINIC | Age: 70
End: 2025-03-18
Payer: COMMERCIAL

## 2025-03-18 VITALS
HEART RATE: 75 BPM | SYSTOLIC BLOOD PRESSURE: 110 MMHG | DIASTOLIC BLOOD PRESSURE: 70 MMHG | OXYGEN SATURATION: 98 % | TEMPERATURE: 97.8 F

## 2025-03-18 DIAGNOSIS — G96.01 CSF OTORRHEA: Primary | ICD-10-CM

## 2025-03-18 PROCEDURE — 99213 OFFICE O/P EST LOW 20 MIN: CPT | Performed by: NEUROLOGICAL SURGERY

## 2025-03-18 NOTE — PROGRESS NOTES
Name: Anusha Mchugh      : 1955      MRN: [id number]  Encounter Provider: Oscar Borjas MD  Encounter Date: 3/18/2025   Encounter department: [facility]  :  Assessment & Plan        History of Present Illness     Patient was previously evaluated on 25.     She is a 69-year-old female with h/o obesity (BMI 40), anxiety/depression, HTN, LOUISE who p/w right hearing loss and CSF otorrhea, now s/p lumbar drain placement and right endoscopic middle fossa craniectomy for repair of CSF leak and tegmen defect with ENT on 24.     Today, she reports no new neurologic symptoms or deficits. No recurrent otorrhea or nuchal rigidity.    Recently traveled to [place] and planning another trip to [place].     Incision is healing well without erythema, edema, dehiscence, drainage, or underlying fluid collection. Prior LD site CDI.     At this time, I have no acute neurosurgical or postoperative concerns. Continue follow-up, next for 6-month POV (no imaging unless clinically indicated).      All questions and concerns were addressed during this visit.       HPI     Review of Systems   Constitutional: Negative.    HENT: Negative.  Negative for tinnitus.    Eyes: Negative.  Negative for visual disturbance.   Respiratory: Negative.     Cardiovascular: Negative.    Gastrointestinal: Negative.    Endocrine: Negative.    Genitourinary: Negative.    Musculoskeletal:  Positive for gait problem (off balance occasionally   no falls).   Skin: Negative.    Allergic/Immunologic: Negative.    Neurological:  Positive for speech difficulty (finding words). Negative for dizziness, weakness, numbness and headaches.        3 mo POV-   Right endoscopic middle fossa craniotomy for repair of tegmen defect and CSF leak  on 24     ENT on 2025        NoAC/None smoker          Hematological: Negative.    Psychiatric/Behavioral: Negative.     All other systems reviewed and are negative.    I have  personally reviewed the MA's review of systems and made changes as necessary.    Past Medical History   Past Medical History:   Diagnosis Date    Anxiety     Arthritis     Asthma     CPAP (continuous positive airway pressure) dependence     Depression     Ear problems     GERD (gastroesophageal reflux disease)     HL (hearing loss) Nov 2023    Started as Sporadic. Seems worse now    Hypertension     Nasal congestion     Very frequent    Obesity N/A    PONV (postoperative nausea and vomiting)     Sleep apnea Sept 2009     Past Surgical History:   Procedure Laterality Date    BREAST SURGERY      cyst removal on bilateral breast    ECTOPIC PREGNANCY SURGERY      EYE SURGERY      cataract    KNEE SURGERY      RETROMASTOID CRANIOTOMY Right 12/16/2024    Procedure: CRANIOTOMY MIDDLE FOSSA,RETROMASTOID APPROACH FOR ENT;  Surgeon: Briseyda Echols MD;  [facility];  Service: ENT    TEGMAN DEFECT REPAIR CSF LEAK Right 12/16/2024    Procedure: Right endoscopic middle fossa craniotomy for repair of tegmen defect and CSF leak with temporalis fascia or muscle flap, with neuromonitoring (CN 7/8) and intraoperative lumbar drain placement;  Surgeon: Oscar Borjas MD;  Location[facility];  Service: Neurosurgery     Family History   Problem Relation Age of Onset    Cancer Mother     Stroke Father     Diabetes Father         Type 2    Arthritis Father      she reports that she has never smoked. She has never used smokeless tobacco. She reports that she does not currently use alcohol. She reports current drug use. Drug: Marijuana.  Current Outpatient Medications   Medication Instructions    Acetaminophen (APAP 500 PO) 2 tablets, Every 6 hours PRN    albuterol (PROVENTIL HFA,VENTOLIN HFA) 90 mcg/act inhaler 2 puffs, Inhalation, Every 6 hours PRN    Cetirizine HCl (ZYRTEC PO) 10 mg, Daily    cholecalciferol (VITAMIN D3) 2,000 Units, Daily    famotidine (PEPCID) 20 mg, Oral, 2 times daily    levETIRAcetam (KEPPRA) 500 mg,  Oral, Every 12 hours scheduled    PARoxetine (PAXIL) 20 mg, Oral, Daily    Probiotic Product (PROBIOTIC DAILY PO) 1 capsule, Daily    triamcinolone (KENALOG) 0.1 % ointment Apply to affected area twice daily for 14 days as needed. May repeat course after one week break. DO NOT apply to face, groin, axillae.    valsartan-hydrochlorothiazide (DIOVAN-HCT) 160-12.5 MG per tablet 1 tablet, Oral, Daily     Allergies   Allergen Reactions    Amoxicillin-Pot Clavulanate Diarrhea      Objective   /70 (Patient Position: Sitting, Cuff Size: Standard)   Pulse 75   Temp 97.8 °F (36.6 °C) (Temporal)   SpO2 98%     Physical Exam  Constitutional:       Appearance: Normal appearance.   HENT:      Head: Normocephalic and atraumatic.   Eyes:      General: Lids are normal.      Extraocular Movements: Extraocular movements intact.      Pupils: Pupils are equal, round, and reactive to light.   Cardiovascular:      Rate and Rhythm: Normal rate and regular rhythm.      Pulses: Normal pulses.      Heart sounds: Normal heart sounds.   Pulmonary:      Effort: Pulmonary effort is normal.      Breath sounds: Normal breath sounds.   Abdominal:      General: Abdomen is flat.      Palpations: Abdomen is soft.   Musculoskeletal:         General: Normal range of motion.      Cervical back: Normal range of motion.   Skin:     General: Skin is warm.   Neurological:      General: No focal deficit present.      Mental Status: She is alert. Mental status is at baseline.      Motor: Motor strength is normal.     Coordination: Coordination is intact.      Deep Tendon Reflexes: Reflexes are normal and symmetric.   Psychiatric:         Mood and Affect: Mood normal.         Speech: Speech normal.         Behavior: Behavior normal.       Neurological Exam  Mental Status  Alert. Oriented to person, place, time and situation. Speech is normal. Language is fluent with no aphasia. Attention and concentration are normal.    Cranial Nerves  CN II: Visual  acuity is normal. Visual fields full to confrontation.  CN III, IV, VI: Extraocular movements intact bilaterally. Normal lids and orbits bilaterally. Pupils equal round and reactive to light bilaterally.  CN V: Facial sensation is normal.  CN VII: Full and symmetric facial movement.  CN VIII: Hearing is normal.  CN IX, X: Palate elevates symmetrically. Normal gag reflex.  CN XI: Shoulder shrug strength is normal.  CN XII: Tongue midline without atrophy or fasciculations.    Motor  Normal muscle bulk throughout. Normal muscle tone. Strength is 5/5 throughout all four extremities.    Sensory  Sensation is intact to light touch, pinprick, vibration and proprioception in all four extremities.    Reflexes  Deep tendon reflexes are 2+ and symmetric in all four extremities.    Coordination    Finger-to-nose, rapid alternating movements and heel-to-shin normal bilaterally without dysmetria.    Gait  Normal casual, toe, heel and tandem gait.  No recurrent otorrhea or nuchal rigidity  Incision healed    Radiology Results Review: I have reviewed the following images/report studies in PACS: DXA bone density spine hip and pelvis  Result Date: 3/17/2025  1. Normal bone density. 2.  The 10 year risk of hip fracture is 0.3% with the 10 year risk of major osteoporotic fracture being 6.7% as calculated by the [employer] fracture risk assessment tool (FRAX, which is based on data generated by the WHO Collaborating [place] for Metabolic Bone Diseases). 3.  The current Bone Health and Osteoporosis Foundation (BHOF) guidelines recommend treating patients with a T-score of -2.5 or less in the lumbar spine or hips, or in post-menopausal women and men over the age of 50 with low bone mass (osteopenia; T-score between -1.0 and -2.5) and a FRAX 10 year risk score of > 3% for hip fracture and/or > 20% for major osteoporosis-related fracture (clinical vertebral, hip, forearm, or proximal humerus). 4.  The BHOF recommends follow-up  DXA in 1-2 years after initiating or changing medical therapy for osteoporosis and at appropriate intervals thereafter, according to clinical circumstances. More frequent BMD testing may be warranted in higher-risk individuals (multiple fractures, older age, very low BMD). Less frequent BMD testing is warranted as follow-up in patients with initial T-scores in the normal or slightly below normal range (osteopenia) and for patients who have remained fracture free on  treatment. The FRAX algorithm has certain limitations: -FRAX has not been validated in patients currently or previously treated with pharmacotherapy for osteoporosis.  In such patients, clinical judgment must be exercised in interpreting FRAX scores. -Prior hip, vertebral and humeral fragility fractures appear to confer greater risk of subsequent fracture than fractures at other sites (this is especially true for individuals with severe vertebral fractures), but quantification of this incremental risk is not possible with FRAX. -FRAX underestimates fracture risk in patients with history of multiple fragility fractures. -FRAX may underestimate fracture risk in patients with history of frequent falls. -It is not appropriate to use FRAX to monitor treatment response. WHO CLASSIFICATION: Normal (a T-score of -1.0 or higher) Low bone mineral density (a T-score of less than -1.0 but higher than -2.5) Osteoporosis (a T-score of -2.5 or less) Severe osteoporosis (a T-score of -2.5 or less with a fragility fracture) SELECTED REFERENCES: Kelli MS, Berto SL, Cee KL, et al. The clinician's guide to prevention and treatment of osteoporosis. Osteoporos Int 2022; 33:5308-6982. Susan D, Owen SB, Damien A, et al. DXA reporting updates: 2023 Official Positions of the International Society for Clinical Densitometry. J Clin Densitom 2024; 27: 755840 (https://doi.org/10.1016/j.jocd.2023.778390). Workstation performed: [id number]        Administrative Statements    I have spent a total time of 20 minutes in caring for this patient on the day of the visit/encounter including Diagnostic results, Prognosis, Risks and benefits of tx options, Instructions for management, Patient and family education, Importance of tx compliance, Risk factor reductions, Impressions, Counseling / Coordination of care, Documenting in the medical record, Reviewing/placing orders in the medical record (including tests, medications, and/or procedures), Obtaining or reviewing history  , and Communicating with other healthcare professionals .

## 2025-03-23 PROBLEM — Z00.00 MEDICARE ANNUAL WELLNESS VISIT, SUBSEQUENT: Status: RESOLVED | Noted: 2025-02-21 | Resolved: 2025-03-23

## 2025-05-31 DIAGNOSIS — I10 PRIMARY HYPERTENSION: ICD-10-CM

## 2025-06-01 RX ORDER — VALSARTAN AND HYDROCHLOROTHIAZIDE 160; 12.5 MG/1; MG/1
1 TABLET, FILM COATED ORAL DAILY
Qty: 90 TABLET | Refills: 1 | Status: SHIPPED | OUTPATIENT
Start: 2025-06-01

## 2025-06-06 DIAGNOSIS — F41.9 ANXIETY AND DEPRESSION: ICD-10-CM

## 2025-06-06 DIAGNOSIS — F32.A ANXIETY AND DEPRESSION: ICD-10-CM

## 2025-06-06 RX ORDER — PAROXETINE 20 MG/1
20 TABLET, FILM COATED ORAL DAILY
Qty: 90 TABLET | Refills: 1 | Status: SHIPPED | OUTPATIENT
Start: 2025-06-06

## 2025-06-18 ENCOUNTER — APPOINTMENT (OUTPATIENT)
Dept: LAB | Facility: CLINIC | Age: 70
End: 2025-06-18
Attending: FAMILY MEDICINE
Payer: COMMERCIAL

## 2025-06-18 ENCOUNTER — OFFICE VISIT (OUTPATIENT)
Dept: NEUROSURGERY | Facility: CLINIC | Age: 70
End: 2025-06-18
Payer: COMMERCIAL

## 2025-06-18 VITALS
HEART RATE: 75 BPM | TEMPERATURE: 97.1 F | SYSTOLIC BLOOD PRESSURE: 138 MMHG | DIASTOLIC BLOOD PRESSURE: 80 MMHG | OXYGEN SATURATION: 99 %

## 2025-06-18 DIAGNOSIS — G96.01 CSF OTORRHEA: Primary | ICD-10-CM

## 2025-06-18 DIAGNOSIS — D64.9 ANEMIA, MILD: ICD-10-CM

## 2025-06-18 DIAGNOSIS — R73.03 PREDIABETES: ICD-10-CM

## 2025-06-18 LAB
BASOPHILS # BLD AUTO: 0.05 THOUSANDS/ÂΜL (ref 0–0.1)
BASOPHILS NFR BLD AUTO: 1 % (ref 0–1)
EOSINOPHIL # BLD AUTO: 0.29 THOUSAND/ÂΜL (ref 0–0.61)
EOSINOPHIL NFR BLD AUTO: 4 % (ref 0–6)
ERYTHROCYTE [DISTWIDTH] IN BLOOD BY AUTOMATED COUNT: 13.8 % (ref 11.6–15.1)
FERRITIN SERPL-MCNC: 54 NG/ML (ref 30–307)
HCT VFR BLD AUTO: 42.5 % (ref 34.8–46.1)
HGB BLD-MCNC: 14.7 G/DL (ref 11.5–15.4)
IMM GRANULOCYTES # BLD AUTO: 0.03 THOUSAND/UL (ref 0–0.2)
IMM GRANULOCYTES NFR BLD AUTO: 0 % (ref 0–2)
IRON SATN MFR SERPL: 27 % (ref 15–50)
IRON SERPL-MCNC: 92 UG/DL (ref 50–212)
LYMPHOCYTES # BLD AUTO: 2.03 THOUSANDS/ÂΜL (ref 0.6–4.47)
LYMPHOCYTES NFR BLD AUTO: 25 % (ref 14–44)
MCH RBC QN AUTO: 30.6 PG (ref 26.8–34.3)
MCHC RBC AUTO-ENTMCNC: 34.6 G/DL (ref 31.4–37.4)
MCV RBC AUTO: 88 FL (ref 82–98)
MONOCYTES # BLD AUTO: 0.83 THOUSAND/ÂΜL (ref 0.17–1.22)
MONOCYTES NFR BLD AUTO: 10 % (ref 4–12)
NEUTROPHILS # BLD AUTO: 4.85 THOUSANDS/ÂΜL (ref 1.85–7.62)
NEUTS SEG NFR BLD AUTO: 60 % (ref 43–75)
NRBC BLD AUTO-RTO: 0 /100 WBCS
PLATELET # BLD AUTO: 238 THOUSANDS/UL (ref 149–390)
PMV BLD AUTO: 9.3 FL (ref 8.9–12.7)
RBC # BLD AUTO: 4.81 MILLION/UL (ref 3.81–5.12)
TIBC SERPL-MCNC: 334.6 UG/DL (ref 250–450)
TRANSFERRIN SERPL-MCNC: 239 MG/DL (ref 203–362)
UIBC SERPL-MCNC: 243 UG/DL (ref 155–355)
WBC # BLD AUTO: 8.08 THOUSAND/UL (ref 4.31–10.16)

## 2025-06-18 PROCEDURE — 36415 COLL VENOUS BLD VENIPUNCTURE: CPT

## 2025-06-18 PROCEDURE — 99213 OFFICE O/P EST LOW 20 MIN: CPT | Performed by: NEUROLOGICAL SURGERY

## 2025-06-18 PROCEDURE — 82728 ASSAY OF FERRITIN: CPT

## 2025-06-18 PROCEDURE — 85025 COMPLETE CBC W/AUTO DIFF WBC: CPT

## 2025-06-18 PROCEDURE — 83540 ASSAY OF IRON: CPT

## 2025-06-18 PROCEDURE — 83550 IRON BINDING TEST: CPT

## 2025-06-18 PROCEDURE — 83036 HEMOGLOBIN GLYCOSYLATED A1C: CPT

## 2025-06-18 NOTE — PROGRESS NOTES
Name: Anusha Mchugh      : 1955      MRN: [id number]  Encounter Provider: Oscar Borjas MD  Encounter Date: 2025   Encounter department: [facility]  :  Assessment & Plan        History of Present Illness     Patient was previously evaluated on 25 and 3/18/25.     She is a 70-year-old female with h/o obesity (BMI 40), anxiety/depression, HTN, LOUISE who p/w right hearing loss and CSF otorrhea, now s/p lumbar drain placement and right endoscopic middle fossa craniectomy for repair of CSF leak and tegmen defect with ENT on 24.     Today, she reports no new neurologic symptoms or deficits. No recurrent otorrhea or nuchal rigidity. Her hearing has improved.     Incision healed well without erythema, edema, dehiscence, drainage, or underlying fluid collection. Prior LD site CDI.    Has no complaints today. Remains active, busy gardening at home.     At this time, I have no acute neurosurgical or postoperative concerns. Continue follow-up, next for 1-year POV (no imaging unless clinically indicated).      All questions and concerns were addressed during this visit.       HPI     Review of Systems   Constitutional: Negative.    HENT:  Positive for tinnitus (occasionally).    Eyes: Negative.  Negative for visual disturbance.   Respiratory: Negative.     Cardiovascular: Negative.    Gastrointestinal: Negative.    Endocrine: Negative.    Genitourinary: Negative.    Musculoskeletal: Negative.    Skin: Negative.    Allergic/Immunologic: Negative.    Neurological: Negative.  Negative for dizziness, weakness, light-headedness, numbness and headaches.        6 mo POV    No images needed     Right endoscopic middle fossa craniotomy for repair of tegmen defect and CSF leak  on 24     ENT on 5/15/25     Tinnitus occasionally    No AC/None smoker        Hematological: Negative.    Psychiatric/Behavioral: Negative.     All other systems reviewed and are negative.    I have  personally reviewed the MA's review of systems and made changes as necessary.    Past Medical History   Past Medical History[1]  Past Surgical History[2]  Family History[3]  she reports that she has never smoked. She has never used smokeless tobacco. She reports that she does not currently use alcohol. She reports current drug use. Drug: Marijuana.  Current Outpatient Medications   Medication Instructions    Acetaminophen (APAP 500 PO) 2 tablets, Every 6 hours PRN    albuterol (PROVENTIL HFA,VENTOLIN HFA) 90 mcg/act inhaler 2 puffs, Inhalation, Every 6 hours PRN    Cetirizine HCl (ZYRTEC PO) 10 mg, Daily    cholecalciferol (VITAMIN D3) 2,000 Units, Daily    famotidine (PEPCID) 20 mg, Oral, 2 times daily    levETIRAcetam (KEPPRA) 500 mg, Oral, Every 12 hours scheduled    PARoxetine (PAXIL) 20 mg, Oral, Daily    Probiotic Product (PROBIOTIC DAILY PO) 1 capsule, Daily    triamcinolone (KENALOG) 0.1 % ointment Apply to affected area twice daily for 14 days as needed. May repeat course after one week break. DO NOT apply to face, groin, axillae.    valsartan-hydrochlorothiazide (DIOVAN-HCT) 160-12.5 MG per tablet 1 tablet, Oral, Daily   Allergies[4]   Objective   /80 (Patient Position: Sitting, Cuff Size: Standard)   Pulse 75   Temp (!) 97.1 °F (36.2 °C) (Temporal)   SpO2 99%     Physical Exam  Constitutional:       Appearance: Normal appearance.   HENT:      Head: Normocephalic and atraumatic.     Eyes:      General: Lids are normal.      Extraocular Movements: Extraocular movements intact.      Pupils: Pupils are equal, round, and reactive to light.       Cardiovascular:      Rate and Rhythm: Normal rate and regular rhythm.      Pulses: Normal pulses.      Heart sounds: Normal heart sounds.   Pulmonary:      Effort: Pulmonary effort is normal.      Breath sounds: Normal breath sounds.   Abdominal:      General: Abdomen is flat.      Palpations: Abdomen is soft.     Musculoskeletal:         General: Normal range  of motion.      Cervical back: Normal range of motion.     Skin:     General: Skin is warm.     Neurological:      General: No focal deficit present.      Mental Status: She is alert. Mental status is at baseline.      Motor: Motor strength is normal.     Coordination: Coordination is intact.      Deep Tendon Reflexes: Reflexes are normal and symmetric.     Psychiatric:         Mood and Affect: Mood normal.         Speech: Speech normal.         Behavior: Behavior normal.       Neurological Exam  Mental Status  Alert. Oriented to person, place, time and situation. Speech is normal. Language is fluent with no aphasia. Attention and concentration are normal.    Cranial Nerves  CN II: Visual acuity is normal. Visual fields full to confrontation.  CN III, IV, VI: Extraocular movements intact bilaterally. Normal lids and orbits bilaterally. Pupils equal round and reactive to light bilaterally.  CN V: Facial sensation is normal.  CN VII: Full and symmetric facial movement.  CN VIII: Hearing is normal.  CN IX, X: Palate elevates symmetrically. Normal gag reflex.  CN XI: Shoulder shrug strength is normal.  CN XII: Tongue midline without atrophy or fasciculations.    Motor  Normal muscle bulk throughout. Normal muscle tone. Strength is 5/5 throughout all four extremities.    Sensory  Sensation is intact to light touch, pinprick, vibration and proprioception in all four extremities.    Reflexes  Deep tendon reflexes are 2+ and symmetric in all four extremities.    Coordination    Finger-to-nose, rapid alternating movements and heel-to-shin normal bilaterally without dysmetria.    Gait  Normal casual, toe, heel and tandem gait.      Radiology Results Review: I have reviewed the following images/report studies in PACS: No results found.     Administrative Statements   I have spent a total time of 20 minutes in caring for this patient on the day of the visit/encounter including Diagnostic results, Prognosis, Risks and benefits  of tx options, Instructions for management, Patient and family education, Importance of tx compliance, Risk factor reductions, Impressions, Counseling / Coordination of care, Documenting in the medical record, Reviewing/placing orders in the medical record (including tests, medications, and/or procedures), Obtaining or reviewing history  , and Communicating with other healthcare professionals .           [1]   Past Medical History:  Diagnosis Date    Anxiety     Arthritis     Asthma     CPAP (continuous positive airway pressure) dependence     Depression     Ear problems     GERD (gastroesophageal reflux disease)     HL (hearing loss) Nov 2023    Started as Sporadic. Seems worse now    Hypertension     Nasal congestion     Very frequent    Obesity N/A    PONV (postoperative nausea and vomiting)     Sleep apnea Sept 2009   [2]   Past Surgical History:  Procedure Laterality Date    BREAST SURGERY      cyst removal on bilateral breast    ECTOPIC PREGNANCY SURGERY      EYE SURGERY      cataract    KNEE SURGERY      RETROMASTOID CRANIOTOMY Right 12/16/2024    Procedure: CRANIOTOMY MIDDLE FOSSA,RETROMASTOID APPROACH FOR ENT;  Surgeon: Briseyda Echols MD;  [facility];  Service: ENT    TEGMAN DEFECT REPAIR CSF LEAK Right 12/16/2024    Procedure: Right endoscopic middle fossa craniotomy for repair of tegmen defect and CSF leak with temporalis fascia or muscle flap, with neuromonitoring (CN 7/8) and intraoperative lumbar drain placement;  Surgeon: Oscar Borjas MD;  [facility];  Service: Neurosurgery   [3]   Family History  Problem Relation Name Age of Onset    Cancer Mother Mother     Stroke Father Federico     Diabetes Father Federico         Type 2    Arthritis Father Federico    [4]   Allergies  Allergen Reactions    Amoxicillin-Pot Clavulanate Diarrhea

## 2025-06-19 LAB
EST. AVERAGE GLUCOSE BLD GHB EST-MCNC: 120 MG/DL
HBA1C MFR BLD: 5.8 %

## 2025-07-16 ENCOUNTER — OFFICE VISIT (OUTPATIENT)
Dept: SLEEP CENTER | Facility: CLINIC | Age: 70
End: 2025-07-16
Payer: COMMERCIAL

## 2025-07-16 VITALS
BODY MASS INDEX: 35.7 KG/M2 | DIASTOLIC BLOOD PRESSURE: 80 MMHG | WEIGHT: 194 LBS | SYSTOLIC BLOOD PRESSURE: 122 MMHG | HEIGHT: 62 IN

## 2025-07-16 DIAGNOSIS — F12.90 MARIJUANA USE: ICD-10-CM

## 2025-07-16 DIAGNOSIS — E66.9 OBESITY (BMI 30-39.9): ICD-10-CM

## 2025-07-16 DIAGNOSIS — I10 PRIMARY HYPERTENSION: ICD-10-CM

## 2025-07-16 DIAGNOSIS — F32.A ANXIETY AND DEPRESSION: ICD-10-CM

## 2025-07-16 DIAGNOSIS — J45.20 MILD INTERMITTENT ASTHMA WITHOUT COMPLICATION: ICD-10-CM

## 2025-07-16 DIAGNOSIS — F41.9 ANXIETY AND DEPRESSION: ICD-10-CM

## 2025-07-16 DIAGNOSIS — G47.33 OSA (OBSTRUCTIVE SLEEP APNEA): Primary | ICD-10-CM

## 2025-07-16 DIAGNOSIS — R68.2 DRY MOUTH: ICD-10-CM

## 2025-07-16 DIAGNOSIS — G47.30 SLEEP APNEA TREATED WITH CONTINUOUS POSITIVE AIRWAY PRESSURE (CPAP): ICD-10-CM

## 2025-07-16 PROCEDURE — 99214 OFFICE O/P EST MOD 30 MIN: CPT | Performed by: INTERNAL MEDICINE

## 2025-07-16 NOTE — PROGRESS NOTES
Name: Anusha Mchugh      : 1955      MRN: [id number]  Encounter Provider: Mukesh Loomis MD  Encounter Date: 2025   Encounter department: [facility] [place]  :  Assessment & Plan  LOUISE (obstructive sleep apnea)    Orders:    PAP DME Resupply/Reorder    Sleep apnea treated with continuous positive airway pressure (CPAP)         Dry mouth         Primary hypertension         Mild intermittent asthma without complication         Anxiety and depression         Marijuana use         Obesity (BMI 30-39.9)               PLAN: Above listed Problems & Comorbidities were Addressed this Visit.  Improved/stable/controlled/resolved conditions as reviewed in notes.   Results of prior studies and physiologic data reviewed  with the patient.   I discussed treatment options with risks and benefits.  Treatment with  PAP is medically necessary and Anusha is agreable to continue use.   Care of equipment, methods to improve comfort using PAP and importance of compliance with therapy were discussed.  Pressure setting:continue 10-12 cmH2O. Mask type full face  Rx provided to replace supplies and Care coordinated with DME provider.   Discussed strategies for weight reduction.    Follow-up is advised in 1 year or sooner if needed to monitor progress, compliance and to adjust therapy.        History of Present Illness   HPI          Follow-Up Note - Sleep Center   Anusha Mchugh  70 y.o. female  :1955  MRN:[id number]  DOS:2025    CC: I saw this patient for follow-up in clinic today for Sleep disordered breathing, Coexisting Sleep and Medical Problems.  Patient is using a ResMed machine.. Interval changes: None reported.      HST on 2024 demonstrated a respiratory event index (KONSTANTIN) of 8.6.  The lowest SpO2 recorded is 88% and 1.3 minutes during the study was spent with saturations below 90%.  The snore index was 6.2%.    PFSH, Problem List, Medications & Allergies were reviewed in EMR.  "  She  has a past medical history of Anxiety, Arthritis, Asthma, CPAP (continuous positive airway pressure) dependence, Depression, Ear problems, GERD (gastroesophageal reflux disease), HL (hearing loss) (Nov 2023), Hypertension, Nasal congestion, Obesity (N/A), PONV (postoperative nausea and vomiting), and Sleep apnea (Sept 2009).    She has a current medication list which includes the following prescription(s): albuterol, cetirizine hcl, cholecalciferol, famotidine, paroxetine, probiotic product, valsartan-hydrochlorothiazide, acetaminophen, levetiracetam, and triamcinolone.    PHYSIOLOGICAL DATA REVIEW : Using PAP > 4 hours/night 100%. Estimated KONSTANTIN 2.5/hour with pressure of 10-12cm H2O ;.  INTERPRETATION: Compliance is excellent; Pressure setting is:adequate; ;     SUBJECTIVE: With respect to use of PAP, Anusha  is experiencing some adverse effects: dry mouth/throat.She derives benefit.. Is satisfied with sleep and daytime function.     Sleep Routine: Anusha reports getting (Patient-Rptd) (P) 7 hrs sleep; she has no difficulty initiating and maintaining sleep . She arises spontaneously and (Patient-Rptd) (P) Usually feels refreshed.Anusha (Patient-Rptd) (P) Rarely]reports excessive daytime sleepiness,.  She rated herself at Total score: (Patient-Rptd) (P) 1 /24 on the [place] Sleepiness Scale.   Other issues: None reported.     Habits: Reports that she has never smoked. She has never used smokeless tobacco.,  Reports that she does not currently use alcohol.,  Reports current drug use. Drug: Marijuana., Caffeine use: limited; Exercise routine: regular.      ROS: Significant for around 20 pounds intentional weight reduction.  She has nasal symptoms due to environmental allergies.  Asthma is reasonably controlled.  Mood is stable on Paxil..    EXAM: Ht 5' 1.5\" (1.562 m)   Wt 88 kg (194 lb)   BMI 36.06 kg/m²     Wt Readings from Last 3 Encounters:   07/16/25 88 kg (194 lb)   05/15/25 97.1 kg (214 lb) " "  03/20/25 97.1 kg (214 lb)      Patient is well groomed; well appearing.   CNS: Alert, orientated, speech clear & coherent  Psych: cooperative and in no distress. Mental state: Appears normal.  H&N: EOMI; NC/AT: No facial pressure marks, no rashes.    Skin/Extrem: col & hydration normal; no edema  Resp: Respiratory effort is normal  Physical findings otherwise essentially unchanged from previous.    Lab Results   Component Value Date    SODIUM 137 12/20/2024    K 3.8 12/20/2024     12/20/2024    CO2 28 12/20/2024    AGAP 8 12/20/2024    BUN 10 12/20/2024    CREATININE 0.41 (L) 12/20/2024    GLUC 124 12/20/2024    GLUF 119 (H) 11/20/2024    CALCIUM 8.6 12/20/2024    AST 17 11/20/2024    ALT 13 11/20/2024    ALKPHOS 60 11/20/2024    TP 7.1 11/20/2024    TBILI 0.59 11/20/2024    EGFR 106 12/20/2024   ;   Lab Results   Component Value Date    WBC 8.08 06/18/2025    HGB 14.7 06/18/2025    HCT 42.5 06/18/2025    MCV 88 06/18/2025     06/18/2025   : [unfilled]    Sincerely,     Authenticated electronically on 07/16/25   Board Certified Specialist     Portions of the record may have been created with voice recognition software. Occasional wrong word or \"sound a like\" substitutions may have occurred due to the inherent limitations of voice recognition software. There may also be notations and random deletions of words or characters from malfunctioning software. Read the chart carefully and recognize, using context, where substitutions/deletions have occurred.          Review of Systems           "

## 2025-07-16 NOTE — PATIENT INSTRUCTIONS
Strategies to improve dry mouth were discussed. Specifically, ensuring adequate nasal patency, adjusting heat and humidity settings on PAP machine, using Biotene mouthwash &/or Xylimelt.    Nursing Support:  When: Monday through Friday 7:30A-4:30PM except holidays  Where: Our direct line is [phone]  *3  *1.      If you are having a true emergency please call 911.  In the event that the line is busy or it is after hours please leave a voice message and we will return your call.  Please speak clearly, leaving your full name, birth date, best number to reach you and the reason for your call.   Medication refills: We will need the name of the medication, the dosage, the ordering provider, whether you get a 30 or 90 day refill, and the pharmacy name and address.  Medications will be ordered by the provider only.  Nurses cannot call in prescriptions.  Please allow 7 days for medication refills.  Physician requested updates: If your provider requested that you call with an update after starting medication, please be ready to provide us the medication and dosage, what time you take your medication, the time you attempt to fall asleep, time you fall asleep, when you wake up, and what time you get out of bed.  Sleep Study Results: We will contact you with sleep study results and/or next steps after the physician has reviewed your testing.

## 2025-07-17 ENCOUNTER — TELEPHONE (OUTPATIENT)
Dept: SLEEP CENTER | Facility: CLINIC | Age: 70
End: 2025-07-17

## 2025-07-18 LAB

## (undated) DEVICE — DURASEAL 5ML

## (undated) DEVICE — PACK CRANIOTOMY PBDS RF

## (undated) DEVICE — EXOFIN PRECISION PEN HIGH VISCOSITY TOPICAL SKIN ADHESIVE: Brand: EXOFIN PRECISION PEN, 1G

## (undated) DEVICE — PETRI DISH STERILE

## (undated) DEVICE — GLOVE INDICATOR PI UNDERGLOVE SZ 6.5 BLUE

## (undated) DEVICE — MAYO STAND COVER: Brand: CONVERTORS

## (undated) DEVICE — DISPOSABLE SLIM BIPOLAR FORCEPS, NON-STICK,: Brand: SPETZLER-MALIS

## (undated) DEVICE — SURGICEL 4 X 8IN

## (undated) DEVICE — Device: Brand: IQ SYSTEM

## (undated) DEVICE — TRAY FOLEY 16FR URIMETER SURESTEP

## (undated) DEVICE — PAD GROUNDING DUAL ADULT

## (undated) DEVICE — DRAPE TOWEL: Brand: CONVERTORS

## (undated) DEVICE — SUPPLY FEE STD

## (undated) DEVICE — NEEDLE 23G X 1 1/2 SAFETY-GLIDE THIN WALL

## (undated) DEVICE — PROXIMATE SKIN STAPLERS (35 WIDE) CONTAINS 35 STAINLESS STEEL STAPLES (FIXED HEAD): Brand: PROXIMATE

## (undated) DEVICE — PENCIL PRESHARPENED: Brand: BIOSEAL INC.

## (undated) DEVICE — GLOVE SRG BIOGEL 6

## (undated) DEVICE — 3M™ TEGADERM™ TRANSPARENT FILM DRESSING FRAME STYLE, 1628, 6 IN X 8 IN (15 CM X 20 CM), 10/CT 8CT/CASE: Brand: 3M™ TEGADERM™

## (undated) DEVICE — HEMOSTATIC MATRIX SURGIFLO 8ML W/THROMBIN

## (undated) DEVICE — Device

## (undated) DEVICE — MONITORR™ ICP EXTERNAL CSF DRAINAGE AND MONITORING SYSTEM: Brand: MONITORR™

## (undated) DEVICE — IV CATH INTROCAN 18G X 1 1/4 SAFETY

## (undated) DEVICE — SUT MONOCRYL PLUS 4-0 PS-2 18 IN MCP496G

## (undated) DEVICE — INTENDED FOR TISSUE SEPARATION, AND OTHER PROCEDURES THAT REQUIRE A SHARP SURGICAL BLADE TO PUNCTURE OR CUT.: Brand: BARD-PARKER SAFETY BLADES SIZE 15, STERILE

## (undated) DEVICE — INTENDED FOR TISSUE SEPARATION, AND OTHER PROCEDURES THAT REQUIRE A SHARP SURGICAL BLADE TO PUNCTURE OR CUT.: Brand: BARD-PARKER ® CARBON RIB-BACK BLADES

## (undated) DEVICE — 3M™ IOBAN™ 2 ANTIMICROBIAL INCISE DRAPE 6650EZ: Brand: IOBAN™ 2

## (undated) DEVICE — SKN PRP WNG SPNGE PVP SCRB STR: Brand: MEDLINE INDUSTRIES, INC.

## (undated) DEVICE — ANTIBACTERIAL VIOLET BRAIDED (POLYGLACTIN 910), SYNTHETIC ABSORBABLE SUTURE: Brand: COATED VICRYL

## (undated) DEVICE — HERMETIC™ LUMBAR CATHETER CLOSED TIP: Brand: HERMETIC™

## (undated) DEVICE — DRAPE SHEET THREE QUARTER

## (undated) DEVICE — SKIN MARKER DUAL TIP WITH RULER CAP, FLEXIBLE RULER AND LABELS: Brand: DEVON

## (undated) DEVICE — SUT ETHILON 3-0 FS-1 18 IN 663G

## (undated) DEVICE — 1820 FOAM BLOCK NEEDLE COUNTER: Brand: DEVON

## (undated) DEVICE — SPECIMEN CONTAINER STERILE PEEL PACK

## (undated) DEVICE — SYRINGE 10ML LL

## (undated) DEVICE — ELECTRODE BLADE MOD E-Z CLEAN 2.5IN 6.4CM -0012M

## (undated) DEVICE — TOOL MR8-9MH30 MR8 9CM MATCH 3MM: Brand: MIDAS REX MR8

## (undated) DEVICE — PERFORATOR CRANIAL 14MM

## (undated) DEVICE — HOOK ELASTIC STAY 12MM BLUNT SNGL STRL

## (undated) DEVICE — SUT SILK 2-0 18 IN A185H

## (undated) DEVICE — MASTISOL LIQ ADHESIVE 2/3ML

## (undated) DEVICE — COTTON TIP APPLICTOR 2 PK

## (undated) DEVICE — DRAPE INTESTINAL ISOLATION BAG

## (undated) DEVICE — MONITORING SPINAL IMPULSE CASE FEE

## (undated) DEVICE — LIGHT HANDLE COVER SLEEVE DISP BLUE STELLAR

## (undated) DEVICE — DURASEAL MICROMYST APPLICATOR TIP

## (undated) DEVICE — TUBING SUCTION 5MM X 12 FT

## (undated) DEVICE — GAUZE SPONGES,16 PLY: Brand: CURITY

## (undated) DEVICE — GLOVE SRG BIOGEL 6.5

## (undated) DEVICE — PREP SURGICAL PURPREP 26ML